# Patient Record
Sex: FEMALE | Race: WHITE | NOT HISPANIC OR LATINO | Employment: UNEMPLOYED | URBAN - METROPOLITAN AREA
[De-identification: names, ages, dates, MRNs, and addresses within clinical notes are randomized per-mention and may not be internally consistent; named-entity substitution may affect disease eponyms.]

---

## 2018-01-12 NOTE — PROGRESS NOTES
Assessment    1  Encounter for preventive health examination (V70 0) (Z00 00)    Plan  Influenza vaccine administered    · Stop: Fluzone Quadrivalent 0 5 ML Intramuscular Suspension  Need for Tdap vaccination    · Stop: Adacel 5-2-15 5 LF-MCG/0 5 Intramuscular Suspension  Screening for cholesterol level, Screening for diabetes mellitus    · (1) COMPREHENSIVE METABOLIC PANEL; Status:Active; Requested for:[id number];    · (1) HEMOGLOBIN A1C; Status:Active; Requested for:[id number];    · (1) LIPID PANEL, FASTING; Status:Active; Requested for:[id number];     Discussion/Summary  health maintenance visit Currently, she eats a healthy diet and has an adequate exercise regimen  Last PAP about 5 years ago  Pt has GYN  Recommend screening PAP and GYN exam/breast exam  Breast cancer screening: the patient declines breast cancer screening and Pt wishes for a thermonuclear scan, stating she will get that, not mammogram  Colorectal cancer screening: colorectal cancer screening is not indicated  Screening lab work includes Script for [employer] give for fasting cmp, lipids and HA1C as needed for insurance  we will call her with results  The pt refuses Tdap and Influenza today  She was advised to be evaluated by an ophthalmologist and a dentist  Patient discussion: discussed with the patient, form pending BW results - will fax for her and call when done  ***Of note, patient follows with [facility] in [facility] for care of her thyroid  Chief Complaint  pt here today for insurance physical and form to be filled      History of Present Illness  HM, Adult Female: The patient is being seen for a health maintenance evaluation  General Health: The patient's health since the last visit is described as good   Denies changes to health or recent hospitalizations  She has regular dental visits  She complains of vision problems (stagmatism)  Vision care includes wearing reading glasses and last eye exam: oct 2015   She denies hearing loss  Immunizations status:  refuses flu vaccine today  refuses Tdap today  Lifestyle:  She consumes a diverse and healthy diet  (pt avoids eggs, gluten and dairy)   She does not have any weight concerns  She does not exercise regularly  She does not use tobacco  She denies alcohol use  She denies drug use  Reproductive health:  she reports normal menses  Menstrual history: LMP: the last menstrual period was 9/6/16  Recent menstrual periods: bleeding has been normal  The cycles have been regular  The duration of her recent periods has been regular  she uses contraception  For contraception, she has a partner with a vasectomy  she is sexually active  She is monogamous with a male partner  pregnancy history: G 3P 2, 1, 2(miscarriages: 1 )  Screening: Cervical cancer screening includes a pap smear performed 5 years ago  Breast cancer screening includes pt refuses mammogram  She hasn't been previously screened for colorectal cancer  ([facility]  Review of Systems    Constitutional: No fever, no chills, feels well, no tiredness, no recent weight gain or weight loss  Eyes: No complaints of eye pain, no red eyes, no eyesight problems, no discharge, no dry eyes, no itching of eyes  ENT: no complaints of earache, no loss of hearing, no nose bleeds, no nasal discharge, no sore throat, no hoarseness  Cardiovascular: No complaints of slow heart rate, no fast heart rate, no chest pain, no palpitations, no leg claudication, no lower extremity edema  Respiratory: No complaints of shortness of breath, no wheezing, no cough, no SOB on exertion, no orthopnea, no PND  Gastrointestinal: No complaints of abdominal pain, no constipation, no nausea or vomiting, no diarrhea, no bloody stools  Genitourinary: No complaints of dysuria, no incontinence, no pelvic pain, no dysmenorrhea, no vaginal discharge or bleeding     Musculoskeletal: No complaints of arthralgias, no myalgias, no joint swelling or stiffness, no limb pain or swelling  Integumentary: No complaints of skin rash or lesions, no itching, no skin wounds, no breast pain or lump  Neurological: No complaints of headache, no confusion, no convulsions, no numbness, no dizziness or fainting, no tingling, no limb weakness, no difficulty walking  Psychiatric: Not suicidal, no sleep disturbance, no anxiety or depression, no change in personality, no emotional problems  Endocrine: No complaints of proptosis, no hot flashes, no muscle weakness, no deepening of the voice, no feelings of weakness  Hematologic/Lymphatic: No complaints of swollen glands, no swollen glands in the neck, does not bleed easily, does not bruise easily  Active Problems    1  Hypothyroidism (244 9) (E03 9)   2  Preventative health care (V70 0) (Z00 00)   3  Screening for cholesterol level (V77 91) (Z13 220)   4  Screening for diabetes mellitus (V77 1) (Z13 1)    Past Medical History    · History of Acute sinusitis (461 9) (J01 90)   · History of Acute upper respiratory infection (465 9) (J06 9)   · History of Encounter for screening for malignant neoplasm of cervix (V76 2) (Z12 4)   · History of abnormal weight loss (V13 89) (E09 877)   · History of acute sinusitis (V12 69) (Z87 09)   · History of Screening for malignant neoplasm of breast (V76 10) (Z12 39)    Surgical History    · History of  Section    Family History  Mother    · Family history of Breast cancer, female (174 9) (C49 36)   · Family history of   Father    · Family history of Heart disease (429 9) (I51 9)    Social History    · Never A Smoker    Current Meds   1  Nature-Throid 48 75 MG Oral Tablet; bid; Therapy: [id number] to (Evaluate:2016) Recorded    Allergies    1   Penicillins    Vitals   Recorded: 58MUQ1827 19:99OH   Systolic 026   Diastolic 58   Heart Rate 73   Respiration 16   Temperature 98 7 F   O2 Saturation 98   Height 5 ft 4 5 in   Weight 108 lb    BMI Calculated 18 25   BSA Calculated 1 51     Physical Exam    Constitutional   General appearance: No acute distress, well appearing and well nourished  appears healthy, within normal limits of ideal weight and appearance reflects stated age  vitals reviewed  Eyes   Conjunctiva and lids: No swelling, erythema or discharge  Pupils and irises: Equal, round, reactive to light  EOMI, PERRLA B/L  Ears, Nose, Mouth, and Throat   External inspection of ears and nose: Normal     Otoscopic examination: Tympanic membranes translucent with normal light reflex  Canals patent without erythema  Hearing: Normal   grossly normal B/L at 10ft  Nasal mucosa, septum, and turbinates: Normal without edema or erythema  Lips, teeth, and gums: Normal, good dentition  Oropharynx: Normal with no erythema, edema, exudate or lesions  Neck   Thyroid: Normal, no thyromegaly  Pulmonary   Respiratory effort: No increased work of breathing or signs of respiratory distress  Auscultation of lungs: Clear to auscultation  Cardiovascular   Auscultation of heart: Normal rate and rhythm, normal S1 and S2, no murmurs  Carotid pulses: 2+ bilaterally  right 2+, no bruit heard over the right carotid, left 2+ and no bruit heard over the left carotid  Pedal pulses: 2+ bilaterally  Examination of extremities for edema and/or varicosities: Normal     Abdomen   Abdomen: Non-tender, no masses  The abdomen was flat  Bowel sounds were normal  The abdomen was soft and nontender  Lymphatic   Palpation of lymph nodes in neck: No lymphadenopathy  Palpation of lymph nodes in groin: No lymphadenopathy  Musculoskeletal   Gait and station: Normal     Digits and nails: Normal without clubbing or cyanosis  Joints, bones, and muscles: Normal     Range of motion: Normal     Stability: Normal     Muscle strength/tone: Normal     Skin   Skin and subcutaneous tissue: Normal without rashes or lesions      Neurologic   Cranial nerves: Cranial nerves II-XII intact  Reflexes: 2+ and symmetric  Deep tendon reflexes: 1+ right brachioradialis, 1+ left brachioradialis, 1+ right patella and 1+ left patella  Coordination: Normal finger to nose and heel to shin      Psychiatric   Judgment and insight: Normal     Orientation to person, place, and time: Normal     Mood and affect: Normal        Signatures   Electronically signed by : Lupe Cox, [facility]; Sep 28 2016  1:55PM EST                       (Author)    Electronically signed by : Birdie Cano DO; Sep 28 2016  3:39PM EST

## 2021-04-01 ENCOUNTER — OFFICE VISIT (OUTPATIENT)
Dept: OBGYN CLINIC | Facility: CLINIC | Age: 46
End: 2021-04-01
Payer: COMMERCIAL

## 2021-04-01 VITALS
HEIGHT: 66 IN | OXYGEN SATURATION: 98 % | BODY MASS INDEX: 17.64 KG/M2 | DIASTOLIC BLOOD PRESSURE: 70 MMHG | HEART RATE: 81 BPM | SYSTOLIC BLOOD PRESSURE: 110 MMHG | WEIGHT: 109.8 LBS

## 2021-04-01 DIAGNOSIS — E58 DIETARY CALCIUM DEFICIENCY: ICD-10-CM

## 2021-04-01 DIAGNOSIS — Z72.3 INADEQUATE EXERCISE: ICD-10-CM

## 2021-04-01 DIAGNOSIS — Z01.419 ENCOUNTER FOR ANNUAL ROUTINE GYNECOLOGICAL EXAMINATION: Primary | ICD-10-CM

## 2021-04-01 DIAGNOSIS — Z12.4 PAP SMEAR FOR CERVICAL CANCER SCREENING: ICD-10-CM

## 2021-04-01 DIAGNOSIS — Z12.31 VISIT FOR SCREENING MAMMOGRAM: ICD-10-CM

## 2021-04-01 DIAGNOSIS — R63.6 UNDERWEIGHT: ICD-10-CM

## 2021-04-01 DIAGNOSIS — Z12.11 COLON CANCER SCREENING: ICD-10-CM

## 2021-04-01 PROBLEM — E06.3 HYPOTHYROIDISM DUE TO HASHIMOTO'S THYROIDITIS: Status: ACTIVE | Noted: 2021-04-01

## 2021-04-01 PROBLEM — E03.8 HYPOTHYROIDISM DUE TO HASHIMOTO'S THYROIDITIS: Status: ACTIVE | Noted: 2021-04-01

## 2021-04-01 PROBLEM — M50.20 HERNIATED CERVICAL DISC: Status: ACTIVE | Noted: 2021-04-01

## 2021-04-01 PROCEDURE — S0610 ANNUAL GYNECOLOGICAL EXAMINA: HCPCS | Performed by: OBSTETRICS & GYNECOLOGY

## 2021-04-01 RX ORDER — ERGOCALCIFEROL 1.25 MG/1
50000 CAPSULE ORAL
COMMUNITY

## 2021-04-01 RX ORDER — THYROID,PORK 90 MG
TABLET ORAL
COMMUNITY
Start: 2021-02-09

## 2021-04-01 NOTE — PROGRESS NOTES
Pt is a 48 y o   with Patient's last menstrual period was 2021 (exact date)  using condoms (male) and P for [facility] presents for preventive care  She notes the same partner since her last STI evaluation  In her lifetime she has been involved with 5   Safe sexual practices (monogomy, condoms) are followed consistently  · She does  feel safe in the relationship  She does feel safe in her home  · Her calcium intake encompasses milk (cow, goat, almond, cashew, soy, etc) and cheese for a total of 1 servings daily on average  She does take additional Vitamin D (MVI or supplement)  · She exercises 0 times per week  · Her menses occur every 28 Days, last 4-7 days and require regular pad every 7-8 hours  Menstrual History:  OB History        3    Para   2    Term   2            AB   1    Living   2       SAB   1    TAB        Ectopic        Multiple        Live Births   2           Obstetric Comments   Menarche: 14    Menses: 28/4-7/regular pad every 7-8 hours            Menarche age: 14  Patient's last menstrual period was 2021 (exact date)  Period Cycle (Days): 26  Period Duration (Days): 4-7  Period Pattern: Regular  Menstrual Flow: Moderate, Light  Menstrual Control: Thin pad  ·      · She has never recieved an HPV vaccine  · tobacco use : does not use tobacco              · Colonoscopy: never had, referral given  · Mammogram:  Never had; pt reports a thermogram in summer 2020--she reports it was wnl  Reviewed benefits of mammogram--pt will consider rx given  · Pap: 2011-wnl, repeat collected today  Advised that it may not be adequate as she is on her menses and if needs to be repeated we will call her       Past Medical History:   Diagnosis Date    Abnormal Pap smear of cervix 2011-wnl, 2021    Chronic fatigue     Fibromyalgia     Hashimoto's thyroiditis        Past Surgical History:   Procedure Laterality Date     SECTION      X's 2; second one associated with uterine rupture    DILATION AND CURETTAGE, DIAGNOSTIC / THERAPEUTIC      missed     WISDOM TOOTH EXTRACTION         OB History    Para Term  AB Living   3 2 2   1 2   SAB TAB Ectopic Multiple Live Births   1       2      # Outcome Date GA Lbr Shahram/2nd Weight Sex Delivery Anes PTL Lv   3 SAB            2 Term     F CS-LTranv   KAREEM   1 Term     M CS-LTranv   KAREEM      Obstetric Comments   Menarche: 15      Menses: 28/4-7/regular pad every 7-8 hours            Current Outpatient Medications:     [place] Thyroid 90 MG tablet, TAKE ONE TABLET BY ORAL ROUTE EVERY MORNING FOR THYROID, Disp: , Rfl:     Cyanocobalamin (VITAMIN B 12 PO), Take by mouth, Disp: , Rfl:     ergocalciferol (VITAMIN D2) 50,000 units, Take 50,000 Units by mouth, Disp: , Rfl:     Allergies   Allergen Reactions    Penicillins Hallucinations and Other (See Comments)     hallucinations, delusions, vomiting         Social History     Socioeconomic History    Marital status:      Spouse name: None    Number of children: 2    Years of education: None    Highest education level: Master's degree (e g , MA, MS, Sydnee, MEd, MSW, JOSIANE)   Occupational History    Occupation: musician   Social Needs    Financial resource strain: None    Food insecurity     Worry: None     Inability: None    Transportation needs     Medical: None     Non-medical: None   Tobacco Use    Smoking status: Never Smoker    Smokeless tobacco: Never Used   Substance and Sexual Activity    Alcohol use: Not Currently    Drug use: Never    Sexual activity: Yes     Partners: Male     Birth control/protection: Rhythm, Condom Male     Comment: lifetime partners: 5; current partner: 2020   Lifestyle    Physical activity     Days per week: None     Minutes per session: None    Stress: None   Relationships    Social connections     Talks on phone: None     Gets together: None     Attends Protestant service: None     Active member of club or organization: None     Attends meetings of clubs or organizations: None     Relationship status: None    Intimate partner violence     Fear of current or ex partner: None     Emotionally abused: None     Physically abused: None     Forced sexual activity: None   Other Topics Concern    None   Social History Narrative    Alevism: Sharona [employer] blood products    Has h o 1 u PRBC at time of uterine rupture                Exercise:  none    Calcium: 1 c coconut milk 2-3x/week; 1 cheese once weekly,        Family History   Problem Relation Age of Onset   [facility] Breast cancer Mother          age 61    Bone cancer Mother         from breast mets    Heart failure Father     No Known Problems Sister     No Known Problems Brother     Uterine cancer Maternal Grandmother     No Known Problems Maternal Grandfather     Heart disease Paternal Grandfather     Ovarian cancer Neg Hx     Colon cancer Neg Hx        Blood pressure 110/70, pulse 81, height 5' 6" (1 676 m), weight 49 8 kg (109 lb 12 8 oz), last menstrual period 2021, SpO2 98 %, not currently breastfeeding  and Body mass index is 17 72 kg/m²  Physical Exam  Constitutional:       General: She is not in acute distress  Appearance: Normal appearance  She is well-developed  She is not ill-appearing  HENT:      Head: Normocephalic and atraumatic  Eyes:      Extraocular Movements: Extraocular movements intact  Conjunctiva/sclera: Conjunctivae normal    Neck:      Musculoskeletal: Normal range of motion and neck supple  Thyroid: No thyromegaly  Trachea: No tracheal deviation  Cardiovascular:      Rate and Rhythm: Normal rate and regular rhythm  Heart sounds: Normal heart sounds  Pulmonary:      Effort: Pulmonary effort is normal  No respiratory distress  Breath sounds: Normal breath sounds  No stridor  No wheezing or rales  Abdominal:      General: Bowel sounds are normal  There is no distension  Palpations: Abdomen is soft  There is no mass  Tenderness: There is no abdominal tenderness  There is no guarding or rebound  Hernia: No hernia is present  Musculoskeletal: Normal range of motion  General: No tenderness  Lymphadenopathy:      Cervical: No cervical adenopathy  Skin:     General: Skin is warm  Findings: No erythema or rash  Neurological:      Mental Status: She is alert and oriented to person, place, and time  Psychiatric:         Mood and Affect: Mood normal          Behavior: Behavior normal          Thought Content: Thought content normal          Judgment: Judgment normal          Breasts: breasts appear normal, no suspicious masses, no skin or nipple changes or axillary nodes, symmetric fibrous changes in both upper outer quadrants  vulva: normal external genitalia for age and no lesions, masses, epithelial changes, or exudate  vagina: color pink, rugae  well formed rugae and bleeding  with a moderate amount of bleeding  cervix: parous, no lesions  and pap obtained  uterus: NSSC, AF, NT, mobile  adnexa: no masses or tenderness      A/P:  Pt is a 48 y o  [place] with      Diagnoses and all orders for this visit:    Encounter for annual routine gynecological examination    Pap smear for cervical cancer screening  -     Liquid-based pap, screening    Visit for screening mammogram  -     Mammo screening bilateral w 3d & cad; Future    Colon cancer screening  -     Ambulatory referral to Gastroenterology; Future    Dietary calcium deficiency  Patient advised recommendation of daily dietary calcium of 1000 mg calcium  Inadequate exercise  Patient advised recommendation of exercise 5 times per week for 30 minutes  Underweight  Patient advised recommendation of BMI to be between 19-25

## 2022-04-06 ENCOUNTER — TELEPHONE (OUTPATIENT)
Dept: FAMILY MEDICINE CLINIC | Facility: CLINIC | Age: 47
End: 2022-04-06

## 2022-06-19 NOTE — TELEPHONE ENCOUNTER
06/18/22 11:07 PM     Thank you for your request  Your request has been received, reviewed, and the patient chart updated  The PCP has successfully been removed with a patient attribution note  This message will now be completed      Thank you  Leslie Cook

## 2022-10-12 PROBLEM — Z12.11 COLON CANCER SCREENING: Status: RESOLVED | Noted: 2021-04-01 | Resolved: 2022-10-12

## 2024-02-21 PROBLEM — Z01.419 ENCOUNTER FOR ANNUAL ROUTINE GYNECOLOGICAL EXAMINATION: Status: RESOLVED | Noted: 2021-04-01 | Resolved: 2024-02-21

## 2024-02-28 ENCOUNTER — OFFICE VISIT (OUTPATIENT)
Dept: URGENT CARE | Facility: CLINIC | Age: 49
End: 2024-02-28
Payer: COMMERCIAL

## 2024-02-28 VITALS
WEIGHT: 115 LBS | HEART RATE: 93 BPM | TEMPERATURE: 97.2 F | RESPIRATION RATE: 20 BRPM | BODY MASS INDEX: 18.56 KG/M2 | SYSTOLIC BLOOD PRESSURE: 104 MMHG | DIASTOLIC BLOOD PRESSURE: 68 MMHG | OXYGEN SATURATION: 100 %

## 2024-02-28 DIAGNOSIS — J02.9 SORE THROAT: ICD-10-CM

## 2024-02-28 DIAGNOSIS — H60.502 ACUTE OTITIS EXTERNA OF LEFT EAR, UNSPECIFIED TYPE: Primary | ICD-10-CM

## 2024-02-28 LAB — S PYO AG THROAT QL: NEGATIVE

## 2024-02-28 PROCEDURE — 99283 EMERGENCY DEPT VISIT LOW MDM: CPT | Performed by: NURSE PRACTITIONER

## 2024-02-28 PROCEDURE — G0382 LEV 3 HOSP TYPE B ED VISIT: HCPCS | Performed by: NURSE PRACTITIONER

## 2024-02-28 PROCEDURE — 87880 STREP A ASSAY W/OPTIC: CPT | Performed by: NURSE PRACTITIONER

## 2024-02-28 RX ORDER — LEVOTHYROXINE, LIOTHYRONINE 19; 4.5 UG/1; UG/1
TABLET ORAL
COMMUNITY
Start: 2024-02-28

## 2024-02-28 RX ORDER — RIZATRIPTAN BENZOATE 10 MG/1
10 TABLET ORAL
COMMUNITY
Start: 2023-12-11

## 2024-02-28 RX ORDER — OFLOXACIN 3 MG/ML
10 SOLUTION AURICULAR (OTIC) 2 TIMES DAILY
Qty: 5 ML | Refills: 0 | Status: SHIPPED | OUTPATIENT
Start: 2024-02-28 | End: 2024-03-06

## 2024-02-28 RX ORDER — AZITHROMYCIN 250 MG/1
TABLET, FILM COATED ORAL
COMMUNITY
Start: 2024-01-12

## 2024-02-28 NOTE — PATIENT INSTRUCTIONS
Swimmer's Ear   AMBULATORY CARE:   Swimmer's ear , also called otitis externa, is an infection in the outer ear canal. This canal goes from the outside of your ear to your eardrum. Swimmer's ear most often occurs when water remains in your ear after you swim. This creates a moist area for bacteria to grow. Scratches or damage from your fingers, cotton swabs, or other objects can also cause swimmer's ear.       Common signs and symptoms:   Ear pain    Red, swollen, itchy ear canal    Fluid or pus draining from your ear    A plugged ear and trouble hearing    Seek care immediately if:   You have severe ear pain.    You are suddenly not able to hear.    You have new swelling in your face, behind your ears, or in your neck.    You suddenly cannot move part of your face, or it feels numb.    Call your doctor if:   You have a fever.    Your symptoms get worse or do not go away, even after treatment.    You have questions or concerns about your condition or care.    Treatment for swimmer's ear  may include cleaning your outer ear canal first. This will help clean any ear wax, flaky skin, or other discharge. You may then need any of the following:  Medicines:      Ear drops  help fight infection and decrease redness and swelling.    Acetaminophen  decreases pain and fever. It is available without a doctor's order. Ask how much to take and how often to take it. Follow directions. Read the labels of all other medicines you are using to see if they also contain acetaminophen, or ask your doctor or pharmacist. Acetaminophen can cause liver damage if not taken correctly.    NSAIDs , such as ibuprofen, help decrease swelling, pain, and fever. This medicine is available with or without a doctor's order. NSAIDs can cause stomach bleeding or kidney problems in certain people. If you take blood thinner medicine, always ask your healthcare provider if NSAIDs are safe for you. Always read the medicine label and follow directions.    An  ear wick  may be used if your ear canal is blocked. A wick (small tube) made of cotton or gauze is placed in your ear. The wick helps pull extra fluid out of your ear canal. Kristen also help draw medicine into your ear canal.    How to use ear drops:   Warm the bottle of ear drops in your hands  for a few minutes.    Lie down on your side with your infected ear facing up.  This will help the medicine travel completely through your ear canal.    Gently pull the ear up and back.  Carefully drip the correct number of ear drops into your ear. Have another person help you if possible.         For children younger than 3 years,  gently pull and hold the ear down and back.         For children older than 3 years,  gently pull and hold the ear up and back.         Stay in the same position  for 3 to 5 minutes to let the medicine soak in.    Prevent swimmer's ear:   Dry your ears completely after you swim or bathe.  Gently wipe your outer ear with a soft towel or cloth. Use ear plugs when you swim.    Do not put cotton swabs or other objects in your ears.  These can scratch or damage your ear. They can also push ear wax deeper in and irritate your ear.    Put cotton balls gently in your outer ear  when you apply hair spray, hair dye, or perfume.    Follow up with your doctor as directed:  Write down your questions so you remember to ask them during your visits.  © Copyright [employer] 2023 Information is for End User's use only and may not be sold, redistributed or otherwise used for commercial purposes.  The above information is an  only. It is not intended as medical advice for individual conditions or treatments. Talk to your doctor, nurse or pharmacist before following any medical regimen to see if it is safe and effective for you.

## 2024-02-28 NOTE — PROGRESS NOTES
[facility]        NAME: Beth Dorado is a 48 y.o. female  : 1975    MRN: [id number]  DATE: 2024  TIME: 7:49 PM    Assessment and Plan   Acute otitis externa of left ear, unspecified type [H60.502]  1. Acute otitis externa of left ear, unspecified type  ofloxacin (FLOXIN) 0.3 % otic solution      2. Sore throat  POCT rapid strepA            Patient Instructions   Strep test negative today. Pt declines COVID testing. Floxin sent to the pharmacy for otitis externa of the left ear. Discussed SE and use. Advised May take tylenol/ibuprofen as needed for pain. Heat as needed for pain. Salt water gargles as needed for sore throat. May use Mucinex and flonase for PND and congestion. Discussed use of medications.     Follow up with PCP in 3-5 days.  Proceed to  ER if symptoms worsen.    Chief Complaint     Chief Complaint   Patient presents with   • Sore Throat   • Earache     Pt C/O left ear pain with pain radiating down towards the jaw, sinus pressure, and a sore throat that started on Monday.          History of Present Illness       Pt here today with concerns of sore throat, congestion, PND, and left ear pain since Monday. Patient reports her daughter has recently been sick with fever. Patient declines COVID testing. Patient denies fevers, body aches, difficulty swallowing, SOB, N/V/D. Patient reports she has been taking aleve and using a warm compress with some relief. Pt denies hx of recurrent ear infections.     Sore Throat   The current episode started in the past 7 days. The problem has been gradually worsening. The pain is worse on the left side. There has been no fever. The pain is moderate. Associated symptoms include congestion, coughing, ear pain, a plugged ear sensation and trouble swallowing (painful swallowing). Pertinent negatives include no abdominal pain, diarrhea, drooling, ear discharge, headaches, hoarse voice, neck pain, shortness of breath, stridor, swollen glands or  vomiting. She has had no exposure to strep or mono. Treatments tried: ibupropfen. The treatment provided moderate relief.   Earache   There is pain in the left ear. This is a new problem. The current episode started in the past 7 days. The problem occurs constantly. The problem has been gradually worsening. There has been no fever. Associated symptoms include coughing, rhinorrhea and a sore throat. Pertinent negatives include no abdominal pain, diarrhea, ear discharge, headaches, neck pain or vomiting. Treatments tried: Aleve and heating pad. The treatment provided mild relief. There is no history of a chronic ear infection, hearing loss or a tympanostomy tube.       Review of Systems   Review of Systems   Constitutional:  Negative for activity change, chills and fatigue.   HENT:  Positive for congestion, ear pain, postnasal drip, rhinorrhea, sore throat and trouble swallowing (painful swallowing). Negative for drooling, ear discharge, hoarse voice, sinus pressure and sinus pain.    Respiratory:  Positive for cough. Negative for apnea, choking, chest tightness, shortness of breath, wheezing and stridor.    Cardiovascular: Negative.    Gastrointestinal:  Negative for abdominal pain, diarrhea and vomiting.   Musculoskeletal:  Negative for neck pain.   Neurological:  Negative for headaches.         Current Medications       Current Outpatient Medications:   •  [place] Thyroid 90 MG tablet, TAKE ONE TABLET BY ORAL ROUTE EVERY MORNING FOR THYROID, Disp: , Rfl:   •  Cyanocobalamin (VITAMIN B 12 PO), Take by mouth, Disp: , Rfl:   •  ergocalciferol (VITAMIN D2) 50,000 units, Take 50,000 Units by mouth, Disp: , Rfl:   •  NP Thyroid 30 MG tablet, , Disp: , Rfl:   •  ofloxacin (FLOXIN) 0.3 % otic solution, Administer 10 drops into the left ear 2 (two) times a day for 7 days, Disp: 5 mL, Rfl: 0  •  Ubrogepant (UBRELVY) 50 MG tablet, Take 50 mg by mouth, Disp: , Rfl:   •  azithromycin (ZITHROMAX) 250 mg tablet, TAKE 2 TABLETS ON  FIRST DAY , THEN 1 TABLET DAILY FOR 4 DAYS (Patient not taking: Reported on 2024), Disp: , Rfl:   •  rimegepant sulfate (NURTEC) 75 mg TBDP, Take 75 mg by mouth (Patient not taking: Reported on 2024), Disp: , Rfl:   •  rizatriptan (MAXALT) 10 mg tablet, Take 10 mg by mouth (Patient not taking: Reported on 2024), Disp: , Rfl:     Current Allergies     Allergies as of 2024 - Reviewed 2024   Allergen Reaction Noted   • Penicillins Hallucinations and Other (See Comments) 2007            The following portions of the patient's history were reviewed and updated as appropriate: allergies, current medications, past family history, past medical history, past social history, past surgical history and problem list.     Past Medical History:   Diagnosis Date   • Abnormal Pap smear of cervix 2011-wnl, 2021-wnl, HRHPV neg   • Chronic fatigue    • Fibromyalgia    • Hashimoto's thyroiditis        Past Surgical History:   Procedure Laterality Date   •  SECTION      X's 2; second one associated with uterine rupture   • DILATION AND CURETTAGE, DIAGNOSTIC / THERAPEUTIC      missed    • WISDOM TOOTH EXTRACTION         Family History   Problem Relation Age of Onset   • Breast cancer Mother          age 63   • Bone cancer Mother         from breast mets   • Heart failure Father    • No Known Problems Sister    • No Known Problems Brother    • Uterine cancer Maternal Grandmother    • No Known Problems Maternal Grandfather    • Heart disease Paternal Grandfather    • Ovarian cancer Neg Hx    • Colon cancer Neg Hx          Medications have been verified.        Objective   /68 (BP Location: Left arm, Patient Position: Sitting, Cuff Size: Standard)   Pulse 93   Temp (!) 97.2 °F (36.2 °C) (Tympanic)   Resp 20   Wt 52.2 kg (115 lb)   SpO2 100%   BMI 18.56 kg/m²   No LMP recorded.       Physical Exam     Physical Exam  Vitals and nursing note reviewed.    Constitutional:       General: She is not in acute distress.     Appearance: She is well-developed. She is ill-appearing. She is not toxic-appearing or diaphoretic.   HENT:      Head: Normocephalic and atraumatic.      Right Ear: Hearing, tympanic membrane, ear canal and external ear normal.      Left Ear: Hearing and external ear normal. Tenderness present. Tympanic membrane is erythematous and bulging.   Neurological:      Mental Status: She is alert.

## 2024-02-28 NOTE — PROGRESS NOTES
[facility]    NAME: Beth Dorado is a 48 y.o. female  : 1975    MRN: [id number]  DATE: 2024  TIME: 6:36 PM    Assessment and Plan   No primary diagnosis found.  No diagnosis found.    Patient Instructions   There are no Patient Instructions on file for this visit.    Chief Complaint   No chief complaint on file.      History of Present Illness   Beth Dorado presents to the clinic c/o  HPI    Review of Systems   Review of Systems    Current Medications     Long-Term Medications   Medication Sig Dispense Refill    [place] Thyroid 90 MG tablet TAKE ONE TABLET BY ORAL ROUTE EVERY MORNING FOR THYROID      ergocalciferol (VITAMIN D2) 50,000 units Take 50,000 Units by mouth         Current Allergies     Allergies as of 2024 - Reviewed 2021   Allergen Reaction Noted    Penicillins Hallucinations and Other (See Comments) 2007          The following portions of the patient's history were reviewed and updated as appropriate: allergies, current medications, past family history, past medical history, past social history, past surgical history and problem list.  Past Medical History:   Diagnosis Date    Abnormal Pap smear of cervix 2011-wnl, 2021-wnl, HRHPV neg    Chronic fatigue     Fibromyalgia     Hashimoto's thyroiditis      Past Surgical History:   Procedure Laterality Date     SECTION      X's 2; second one associated with uterine rupture    DILATION AND CURETTAGE, DIAGNOSTIC / THERAPEUTIC      missed     WISDOM TOOTH EXTRACTION       Family History   Problem Relation Age of Onset    Breast cancer Mother          age 63    Bone cancer Mother         from breast mets    Heart failure Father     No Known Problems Sister     No Known Problems Brother     Uterine cancer Maternal Grandmother     No Known Problems Maternal Grandfather     Heart disease Paternal Grandfather     Ovarian cancer Neg Hx     Colon cancer Neg Hx        Objective   There  were no vitals taken for this visit.  No LMP recorded.       Physical Exam     Physical Exam

## 2024-02-29 ENCOUNTER — TELEPHONE (OUTPATIENT)
Dept: URGENT CARE | Facility: CLINIC | Age: 49
End: 2024-02-29

## 2024-02-29 NOTE — TELEPHONE ENCOUNTER
[facility] called stating that they did not receive the prescription last night.  Verbal rx given over the phone to pharmacist.

## 2024-02-29 NOTE — TELEPHONE ENCOUNTER
Patient called to office stating that the prescription called in last night was not at the pharmacy.  Instructed patient that it was called into [facility] in [place].  Instructed her that if it was not there to have them call us and we can call it back in.

## 2024-03-28 ENCOUNTER — OFFICE VISIT (OUTPATIENT)
Dept: GYNECOLOGY | Facility: CLINIC | Age: 49
End: 2024-03-28
Payer: COMMERCIAL

## 2024-03-28 VITALS
BODY MASS INDEX: 18.93 KG/M2 | SYSTOLIC BLOOD PRESSURE: 118 MMHG | HEIGHT: 65 IN | WEIGHT: 113.6 LBS | DIASTOLIC BLOOD PRESSURE: 66 MMHG

## 2024-03-28 DIAGNOSIS — Z12.31 ENCOUNTER FOR SCREENING MAMMOGRAM FOR BREAST CANCER: ICD-10-CM

## 2024-03-28 DIAGNOSIS — Z01.419 ENCOUNTER FOR ANNUAL ROUTINE GYNECOLOGICAL EXAMINATION: Primary | ICD-10-CM

## 2024-03-28 DIAGNOSIS — N92.0 MENORRHAGIA WITH REGULAR CYCLE: ICD-10-CM

## 2024-03-28 PROCEDURE — 99386 PREV VISIT NEW AGE 40-64: CPT | Performed by: OBSTETRICS & GYNECOLOGY

## 2024-03-28 NOTE — PROGRESS NOTES
Assessment/Plan:    Anemia-I recommended that she start over-the-counter iron.  She has some at home and will start taking this.    Heavy menses-this is new and given her anemia, should be evaluated.  She also had an ultrasound last year that showed some cystic changes in her endometrium and the radiologist suggested ruling out hyperplasia.  She has not had any irregular cycles and no bleeding in between.  It is.  This ultrasound was done 1 year ago and should be repeated.  I recommended a sonohysterogram and endometrial biopsy can be done at the same time.  She is agreeable and will schedule this.      Pap is up-to-date, reviewed guidelines    Breast cancer screening-she has never had a mammogram.  She has concerns about radiation.  She gets thermograms.  She has had them and they have been normal.  I explained that there is not good evidence to support the thermogram as a replacement for the mammogram.  Given her family history, I feel that it is important for her to have a mammogram.  She will consider this but does not think she would want to have it in the near future.      colon cancer screening - saw [facility] did not they urged her to schedule colonoscopy.  She is considering this and will give them a call.    I did explain that she does not need to have estradiol levels checked regularly as she is premenopausal and does not need a supplement for this.    discussed preventive care, regular exercise and a healthy diet      No problem-specific Assessment & Plan notes found for this encounter.       Diagnoses and all orders for this visit:    Encounter for annual routine gynecological examination    Encounter for screening mammogram for breast cancer          Subjective:      Patient ID: Beth Dorado is a 49 y.o. female.    New pt - 49 year old female presents for yearly.  She has been getting regular GYN care.  Menstrual cycles are regular approximately every 24 to 35 days.  She does carefully track her cycle.   "Typically she has light flow but the last few months, it has been very heavy for 2 to 3 days and she must change her pads frequently.  She sometimes has clots.  Generally, she bleeds for 6 days.  She does not complain of cramps.  She does not skip cycles.  Occasional night sweats, no hot flashes during the day.    She had an ultrasound last year and she had a cyst on each ovary.  Her endometrium was 17 mm and had a heterogeneous appearance. There were cysts on each ovary.  There were possible fibroids.  She was going to have follow up but did not due to insurance changes.  US was done due to findings noted on CT.    Recent H/H were 10.5/32.6.  her family dr has been check estradiol levels and she has been taking a supplement to lower them.    Past OB/GYN history-2 C-sections, the second 1 revealed a uterine rupture and bladder rupture or laceration.  Both were repaired without difficulty.  1 missed AB with a D&E.  Menarche at age 15  Contraception - she carefully watches her cycles and has used natural family planning in the past.  She sometimes uses condoms.      Migraine with aura  Abnormal pap in , colposcopy, normal since then    Getting thermograms.  She has not had a mammogram, concerned about radiation.  Mother had breast cancer,  at ageg 63 from metastatic disease, probably dx in her 50s      Normal Pap, negative HPV in 2021        The following portions of the patient's history were reviewed and updated as appropriate: allergies, current medications, past family history, past medical history, past social history, past surgical history, and problem list.    Review of Systems   Constitutional: Negative.    Gastrointestinal: Negative.    Genitourinary:  Positive for menstrual problem.         Objective:      /66 (BP Location: Left arm, Patient Position: Sitting)   Ht 5' 5\" (1.651 m)   Wt 51.5 kg (113 lb 9.6 oz)   BMI 18.90 kg/m²          Physical Exam  Vitals reviewed.   Constitutional:  "      Appearance: Normal appearance. She is well-developed.   Neck:      Thyroid: No thyromegaly.      Trachea: No tracheal deviation.   Cardiovascular:      Rate and Rhythm: Normal rate and regular rhythm.   Pulmonary:      Effort: Pulmonary effort is normal.      Breath sounds: Normal breath sounds.   Chest:   Breasts:     Breasts are symmetrical.      Right: Normal. No inverted nipple, mass, nipple discharge, skin change or tenderness.      Left: Normal. No inverted nipple, mass, nipple discharge, skin change or tenderness.   Abdominal:      General: There is no distension.      Palpations: Abdomen is soft. There is no mass.      Tenderness: There is no abdominal tenderness.   Genitourinary:     Labia:         Right: No rash, tenderness, lesion or injury.         Left: No rash, tenderness, lesion or injury.       Vagina: Normal.      Cervix: Normal. No cervical motion tenderness, discharge or friability.      Uterus: Normal.       Adnexa: Right adnexa normal and left adnexa normal.        Right: No mass, tenderness or fullness.          Left: No mass, tenderness or fullness.        Rectum: Normal.   Neurological:      Mental Status: She is alert.

## 2024-04-10 NOTE — PROGRESS NOTES
AMB US Pelvic Non OB    Date/Time: 4/12/2024 9:45 AM    Performed by: Waleska Wheeler  Authorized by: Inge Espinal DO  Universal Protocol:  Consent: Verbal consent obtained.  Consent given by: patient  Timeout called at: 4/12/2024 10:03 AM.  Patient understanding: patient states understanding of the procedure being performed  Patient identity confirmed: verbally with patient    Procedure details:     SIS Procedure: Yes    Indications: non-obstetric vaginal bleeding      Technique:  Transvaginal US, Non-OB    Position: lithotomy exam    Uterine findings:     Length (cm): 10.57    Height (cm):  7.41    Width (cm):  7.91    Endometrial stripe: identified      Endometrium thickness (mm):  16.6  Left ovary findings:     Left ovary:  Visualized    Length (cm): 3.17    Height (cm): 2.25    Width (cm): 1.56  Right ovary findings:     Right ovary:  Visualized    Length (cm): 8.83    Height (cm): 6.1    Width (cm): 6.87  Other findings:     Free pelvic fluid: not identified      Free peritoneal fluid: not identified    Post-Procedure Details:     Impression:  Anteflexed uterus is inhomogeneous throughout and demonstrates a posterior fundal fibroid 3.5cm (previously 2.2cm). There are areas of low level echogenicities throughout suggestive of adenomyosis. The left ovary appears within normal limits.  The right ovary demonstrates a 6.0cm complex cystic mass. No free fluid.     Tolerance:  Tolerated well, no immediate complications    Complications: no complications    Additional Procedure Comments:      Genesco F8 E8C-RS transvaginal transducer Serial # [id number] was used to perform the examination today and subsequently followed with high level disinfection utilizing Trophon EPR procedure.     Ultrasound performed at:     [facility]  [facility] [address] [address]  Phone:  [phone]  Fax:  [phone]    Sonohysterogram    Date/Time: 4/12/2024 9:45 AM    Performed by: Inge Espinal DO  Authorized by: Inge  DO Teddy  Universal Protocol:  Consent: Verbal consent obtained.  Consent given by: patient  Timeout called at: 4/12/2024 10:03 AM.  Patient understanding: patient states understanding of the procedure being performed  Patient identity confirmed: verbally with patient    Pre-procedure:     Prepped with: Hibiclens    Procedure:     Cervix cleaned and prepped: yes      Tenaculum applied to cervix: yes      Uterus sounded: yes      Catheter inserted: yes      Uterine cavity distended with saline: no    Post-procedure:     Patient observed: yes      Post procedure instructions given to patient: yes      Patient tolerated procedure well with no complications: yes    Comments:      Sonohysterogram was attempted. Three different sonohysterogram catheter types were attempted, however the catheter would not pass through the cervix.   Endometrial biopsy    Date/Time: 4/12/2024 9:45 AM    Performed by: Inge Espinal DO  Authorized by: Inge Espinal DO  Universal Protocol:  Consent: Verbal consent obtained.  Consent given by: patient  Timeout called at: 4/12/2024 10:03 AM.  Patient understanding: patient states understanding of the procedure being performed  Patient identity confirmed: verbally with patient    Procedure:     Procedure: endometrial biopsy with Pipelle      A bivalve speculum was placed in the vagina: yes      Specimen collected: insufficient sample size      Patient tolerated procedure well with no complications: yes    Comments:     Procedure comments:  Endometrial biopsy was attempted and subsequently discontinued due to inability to pass catheter into the uterine lining.

## 2024-04-12 ENCOUNTER — PROCEDURE VISIT (OUTPATIENT)
Dept: GYNECOLOGY | Facility: CLINIC | Age: 49
End: 2024-04-12
Payer: COMMERCIAL

## 2024-04-12 ENCOUNTER — ULTRASOUND (OUTPATIENT)
Dept: GYNECOLOGY | Facility: CLINIC | Age: 49
End: 2024-04-12

## 2024-04-12 DIAGNOSIS — N94.89 ADNEXAL MASS: Primary | ICD-10-CM

## 2024-04-12 DIAGNOSIS — N92.0 MENORRHAGIA WITH REGULAR CYCLE: Primary | ICD-10-CM

## 2024-04-12 LAB — SL AMB POCT URINE HCG: NORMAL

## 2024-04-12 PROCEDURE — 99213 OFFICE O/P EST LOW 20 MIN: CPT | Performed by: OBSTETRICS & GYNECOLOGY

## 2024-04-12 NOTE — LETTER
April 15, 2024         Patient: Beth Dorado   YOB: 1975   Date of Visit: 4/12/2024       Dear Dr. Cruz:    I saw Beth Dorado today for evaluation of heavy menstrual cycles and an ovarian cyst that was noted last year on ultrasound.  She was supposed to have an MRI however due to difficulties with insurance, this was not done.  Her menstrual cycles are very heavy.  Endometrial biopsy attempted but was unsuccessful.  I ordered an MRI and a Ca1 25 due to the appearance of her adnexal mass.    Below are my notes for this consultation.    If you have questions, please do not hesitate to call me. I look forward to following your patient along with you.         Sincerely,        Inge Espinal, DO Inge Espinal DO  4/15/2024 10:51 AM  Incomplete  Assessment/Plan:     Heavy menses-sonohysterogram shows a thickened endometrium.  I was unable to do an endometrial biopsy.  On ultrasound, her endocervical canal appeared tortuous.  I tried 3 different sonohysterogram catheters.  I explained that she may need a D&C.    Complex right adnexal mass.  This has doubled in size from her ultrasound last year.  This will need to be removed.  I did recommend an MRI for more information.  I explained that depending on the MRI results and the , I may have her see GYN oncology.  She is also had several pelvic surgeries and may have adhesions.  I explained that  can have a false positive and her age.  It was normal last year.  I recommended repeating it.  These were ordered and she will have this done.  MRI is scheduled for the end of the month.    Fibroid-this has enlarged since last year and could be contributing to her heavy menstrual cycles.    Suggestion of adenomyosis on ultrasound.           Diagnoses and all orders for this visit:    Adnexal mass  -     Cancel: MRI pelvis female wo and w contrast; Future  -     Cancel: ; Future  -     Cancel:   -     MRI pelvis female wo and w contrast;  Future  -     ; Future  -               Subjective:     Patient ID: Beth Dorado is a 49 y.o. female.    HPI    Review of Systems     Objective:     Physical Exam       Inge Espinal DO  4/12/2024  4:38 PM  Sign when Signing Visit  Assessment/Plan:     Unable to do emb, modifcyndy, tried three different catheters    Note to mercedez Avelar and all orders for this visit:    Adnexal mass  -     Cancel: MRI pelvis female wo and w contrast; Future  -     Cancel: ; Future  -     Cancel:   -     MRI pelvis female wo and w contrast; Future  -     ; Future  -               Subjective:     Patient ID: Beth Dorado is a 49 y.o. female.    HPI    Review of Systems      Objective:     Physical Exam

## 2024-04-12 NOTE — PROGRESS NOTES
Assessment/Plan:     Heavy menses-sonohysterogram shows a thickened endometrium.  I was unable to do an endometrial biopsy.  On ultrasound, her endocervical canal appeared tortuous.  I tried 3 different sonohysterogram catheters.  I explained that she may need a D&C.    Complex right adnexal mass.  This has doubled in size from her ultrasound last year.  This will need to be removed.  I did recommend an MRI for more information.  I explained that depending on the MRI results and the , I may have her see GYN oncology.  She is also had several pelvic surgeries and may have adhesions.  I explained that  can have a false positive and her age.  It was normal last year.  I recommended repeating it.  These were ordered and she will have this done.  MRI is scheduled for the end of the month.    Fibroid-this has enlarged since last year and could be contributing to her heavy menstrual cycles.    Suggestion of adenomyosis on ultrasound.           Diagnoses and all orders for this visit:    Adnexal mass  -     Cancel: MRI pelvis female wo and w contrast; Future  -     Cancel: ; Future  -     Cancel:   -     MRI pelvis female wo and w contrast; Future  -     ; Future  -               Subjective:     Patient ID: Beth Dorado is a 49 y.o. female.    Patient here for sonohysterogram.  She was recently seen and having very heavy menstrual cycles.  This was a new finding.  She also had a history of an ultrasound last year that showed a thickened endometrium with a question of hyperplasia and a small complex adnexal mass on the right side.  I recommended a sonohysterogram to further evaluate her endometrium, do an endometrial biopsy and also to evaluate her fibroid and her ovaries.      Ultrasound today shows a fibroid that has slightly enlarged from last year.  She has a thickened endometrium and the right adnexal mass is now measuring 6 cm.  Her left ovary appears normal.  Sonohysterogram attempted  however the catheter could not be placed through her cervix.  Multiple catheters were utilized to attempt to evaluate her endometrium.  She had 2 C-sections and also had a uterine rupture.        Review of Systems   Constitutional: Negative.    Gastrointestinal: Negative.    Genitourinary:  Positive for menstrual problem.         Objective:     Physical Exam  Genitourinary:     General: Normal vulva.      Cervix: Normal.      Uterus: Normal.       Adnexa: Left adnexa normal.        Right: Fullness present.

## 2024-04-17 LAB — CANCER AG125 SERPL-ACNC: 42 U/ML

## 2024-04-26 DIAGNOSIS — N94.89 ADNEXAL MASS: Primary | ICD-10-CM

## 2024-04-29 ENCOUNTER — TELEPHONE (OUTPATIENT)
Age: 49
End: 2024-04-29

## 2024-04-29 ENCOUNTER — TELEPHONE (OUTPATIENT)
Dept: HEMATOLOGY ONCOLOGY | Facility: CLINIC | Age: 49
End: 2024-04-29

## 2024-04-29 NOTE — TELEPHONE ENCOUNTER
I called Beth in response to a referral that was received for patient to establish care with Gynecologic Oncology.      Outreach was made to complete patient's intake questionnaire .     Patient's intake questionnaire was reviewed and complete. Patient's intake has been sent to the team for clinical review.

## 2024-04-29 NOTE — TELEPHONE ENCOUNTER
Patient called stating a referral for Gyn Onc was placed and they called her to set up an appointment. Upon review of chart, I reviewed the following with the patient.  Please call patient and let her know that the MRI shows that her large ovarian cyst is most likely an endometrioma or a hemorrhagic cyst which is benign.  She does have a mildly elevated  however this can be seen with these benign conditions.  I would like her to get the opinion of GYN oncology and if she wants to have this removed, I feel that they should do it as she has had pelvic surgery and most likely has adhesions.  If she wants to be conservative, they can discuss this with her as well.  I will place the referral, please let me know if she has any questions.  Thank you  Patient declined further questions.

## 2024-04-30 ENCOUNTER — TELEPHONE (OUTPATIENT)
Dept: HEMATOLOGY ONCOLOGY | Facility: CLINIC | Age: 49
End: 2024-04-30

## 2024-04-30 NOTE — TELEPHONE ENCOUNTER
I called Beth in response to a referral that was received for patient to establish care with Gynecologic Oncology.     Outreach was made to schedule a consultation.    A consultation was scheduled for patient during this call. Patient is scheduled on 5/6/24 at 1130 with Ayah at the [facility]

## 2024-05-16 ENCOUNTER — OFFICE VISIT (OUTPATIENT)
Dept: GYNECOLOGIC ONCOLOGY | Facility: CLINIC | Age: 49
End: 2024-05-16
Payer: COMMERCIAL

## 2024-05-16 VITALS
TEMPERATURE: 97.7 F | SYSTOLIC BLOOD PRESSURE: 120 MMHG | WEIGHT: 117 LBS | HEIGHT: 65 IN | RESPIRATION RATE: 18 BRPM | HEART RATE: 89 BPM | DIASTOLIC BLOOD PRESSURE: 62 MMHG | BODY MASS INDEX: 19.49 KG/M2 | OXYGEN SATURATION: 97 %

## 2024-05-16 DIAGNOSIS — Z12.31 VISIT FOR SCREENING MAMMOGRAM: ICD-10-CM

## 2024-05-16 DIAGNOSIS — N94.89 ADNEXAL MASS: ICD-10-CM

## 2024-05-16 DIAGNOSIS — Z12.11 COLON CANCER SCREENING: Primary | ICD-10-CM

## 2024-05-16 DIAGNOSIS — N83.201 CYST OF RIGHT OVARY: ICD-10-CM

## 2024-05-16 DIAGNOSIS — N92.0 MENORRHAGIA WITH REGULAR CYCLE: ICD-10-CM

## 2024-05-16 PROBLEM — N83.209 OVARIAN CYST: Status: ACTIVE | Noted: 2024-05-16

## 2024-05-16 PROCEDURE — 99243 OFF/OP CNSLTJ NEW/EST LOW 30: CPT | Performed by: OBSTETRICS & GYNECOLOGY

## 2024-05-16 NOTE — ASSESSMENT & PLAN NOTE
Attempt at office sounding for sonohysterogram was unsuccessful.  Patient has an endometrial stripe measuring 9 mm.  However she is premenopausal.  There is no report of endometrial masses on MRI.  In addition, there are findings consistent/suggestive of adenomyosis (thickened and heterogeneous junctional zone measuring 1.6 cm).  Overall concern for malignancy or premalignant conditions is low.  I discussed with patient options including further exploration or even definitive treatment by means of hysterectomy with uni or bilateral salpingo-oophorectomy versus ongoing observation.  She states bleeding is bothersome but not affecting quality of life overall.    Discussed potential benefit of nonsteroidal anti-inflammatory drugs in terms of decreasing menstrual blood flow.  Patient is not interested in systemic hormonal therapy.  We discussed potential benefit of progestin IUD, however given difficulty in sounding the uterus at the time of hysterosonogram attempt, this option becomes less attractive.    Patient prefers observation and NSAIDs.  I plan to see her back in 3 months.  She will continue to consider the option for definitive surgery.  If irregular bleeding or worsening symptoms arise, she might be considered for hysteroscopy/D&C.

## 2024-05-16 NOTE — ASSESSMENT & PLAN NOTE
Patient continues to decline mammogram.  She has a significant family history.  Today I was emphatic and illustrating the limited efficacy of thermal methods for the detection of early breast cancer.  Patient verbalized understanding.

## 2024-05-16 NOTE — ASSESSMENT & PLAN NOTE
We discussed potential approaches to colon cancer screening.  Patient prefers least invasive approach by Cologuard.  She understands potential need to indicate colonoscopy if abnormal results are noted.  Cologuard ordered.

## 2024-05-16 NOTE — LETTER
May 16, 2024     Inge Espinal DO  [address] [address]    Patient: Beth Dorado   YOB: 1975   Date of Visit: 5/16/2024       Dear Dr. Espinal:    Thank you for referring Beth Dorado to me for evaluation. Below are my notes for this consultation.    If you have questions, please do not hesitate to call me. I look forward to following your patient along with you.         Sincerely,        Edin Poon MD        CC: Osiel Cruz DO

## 2024-05-16 NOTE — ASSESSMENT & PLAN NOTE
I have personally reviewed report from office ultrasound and MRI of the pelvis obtained in April 2024.  I have independently obtained history from patient.    Ovarian cyst is mostly asymptomatic and was found incidentally during imaging for renal colic/emergency room visit.  This lesion measures 3.7 cm and is reported as hemorrhagic and consistent with possible endometrioma on MRI.  I discussed with patient differential diagnosis including benign, borderline and malignant pathology.  I explained the only way to obtain definitive diagnosis by means of surgical resection.  However, given small and homogeneous nature as well as asymptomatic character of this finding I have proposed and we have agreed with close observation.  Slight elevation in  in the 40s noted.  This is of uncertain significance in this premenopausal woman with possible endometriosis/adenomyosis.    Patient will return to the office in 3 months with repeat pelvic ultrasound.

## 2024-05-16 NOTE — PROGRESS NOTES
Assessment/Plan:    Problem List Items Addressed This Visit          Endocrine    Ovarian cyst     I have personally reviewed report from office ultrasound and MRI of the pelvis obtained in April 2024.  I have independently obtained history from patient.    Ovarian cyst is mostly asymptomatic and was found incidentally during imaging for renal colic/emergency room visit.  This lesion measures 3.7 cm and is reported as hemorrhagic and consistent with possible endometrioma on MRI.  I discussed with patient differential diagnosis including benign, borderline and malignant pathology.  I explained the only way to obtain definitive diagnosis by means of surgical resection.  However, given small and homogeneous nature as well as asymptomatic character of this finding I have proposed and we have agreed with close observation.  Slight elevation in  in the 40s noted.  This is of uncertain significance in this premenopausal woman with possible endometriosis/adenomyosis.    Patient will return to the office in 3 months with repeat pelvic ultrasound.           Relevant Orders    US pelvis complete non OB       Obstetrics/Gynecology    Visit for screening mammogram     Patient continues to decline mammogram.  She has a significant family history.  Today I was emphatic and illustrating the limited efficacy of thermal methods for the detection of early breast cancer.  Patient verbalized understanding.         Menorrhagia with regular cycle     Attempt at office sounding for sonohysterogram was unsuccessful.  Patient has an endometrial stripe measuring 9 mm.  However she is premenopausal.  There is no report of endometrial masses on MRI.  In addition, there are findings consistent/suggestive of adenomyosis (thickened and heterogeneous junctional zone measuring 1.6 cm).  Overall concern for malignancy or premalignant conditions is low.  I discussed with patient options including further exploration or even definitive treatment  "by means of hysterectomy with uni or bilateral salpingo-oophorectomy versus ongoing observation.  She states bleeding is bothersome but not affecting quality of life overall.    Discussed potential benefit of nonsteroidal anti-inflammatory drugs in terms of decreasing menstrual blood flow.  Patient is not interested in systemic hormonal therapy.  We discussed potential benefit of progestin IUD, however given difficulty in sounding the uterus at the time of hysterosonogram attempt, this option becomes less attractive.    Patient prefers observation and NSAIDs.  I plan to see her back in 3 months.  She will continue to consider the option for definitive surgery.  If irregular bleeding or worsening symptoms arise, she might be considered for hysteroscopy/D&C.         Relevant Orders    US pelvis complete non OB       Other    Colon cancer screening - Primary     We discussed potential approaches to colon cancer screening.  Patient prefers least invasive approach by Cologuard.  She understands potential need to indicate colonoscopy if abnormal results are noted.  Cologuard ordered.         Relevant Orders    Cologuard     Other Visit Diagnoses       Adnexal mass        Relevant Orders    US pelvis complete non OB          I spent approximately 30 minutes in the care of this patient.  Time was devoted to independent review and interpretation of prior records and outside imaging reports as well as face-to-face counseling.  All questions answered to patient's satisfaction.    CHIEF COMPLAINT:   Consultation for abnormal uterine bleeding and cystic ovarian mass    Patient ID: Beth Dorado is a 49 y.o. female  HPI  49-year-old  with 2 prior  sections.  Her last  was complicated by uterine rupture and bladder injury.  She reports menstrual cycles which are regular and occasionally heavy.  Rarely with clots/soaking through clothing.  She refers to those as mostly \"annoying\".  No intermenstrual bleeding.  " CT scan performed to evaluate kidney stone demonstrated incidental cystic ovarian mass.  She has had other ultrasounds in the past.  Most recently and given history an attempt was made at office sonohysterogram which was unsuccessful.  The patient was sent for MRI which demonstrated at 3.7 cm right ovarian hemorrhagic cystic lesion and findings suggestive of adenomyosis.  The patient was referred for evaluation and treatment.  Her  is noted to be slightly elevated in the 40s.    Breast cancer screening: Has declined mammograms, thermal screening performed.    Prior colon cancer screening.    Review of Systems  Per HPI.  Current Outpatient Medications   Medication Sig Dispense Refill    Cyanocobalamin (VITAMIN B 12 PO) Take by mouth      ergocalciferol (VITAMIN D2) 50,000 units Take 50,000 Units by mouth      rimegepant sulfate (NURTEC) 75 mg TBDP Take 75 mg by mouth      NP Thyroid 30 MG tablet       rizatriptan (MAXALT) 10 mg tablet Take 10 mg by mouth (Patient not taking: Reported on 2024)      Ubrogepant (UBRELVY) 50 MG tablet Take 50 mg by mouth       No current facility-administered medications for this visit.       Allergies   Allergen Reactions    Penicillins Hallucinations and Other (See Comments)     hallucinations, delusions, vomiting         Past Medical History:   Diagnosis Date    Abnormal Pap smear of cervix 2011-wnl, 2021-wnl, HRHPV neg    Chronic fatigue     Disease of thyroid gland     Hashimotos    Fibromyalgia     Hashimoto's thyroiditis     History of transfusion     Miscarriage     Polycystic ovary syndrome        Past Surgical History:   Procedure Laterality Date     SECTION      X's 2; second one associated with uterine rupture    DILATION AND CURETTAGE OF UTERUS  2007    DILATION AND CURETTAGE, DIAGNOSTIC / THERAPEUTIC      missed     WISDOM TOOTH EXTRACTION         OB History          3    Para   2    Term   2            AB  "  1    Living   2         SAB   1    IAB        Ectopic        Multiple        Live Births   2           Obstetric Comments   Menarche: 15    Menses: 28/4-7/regular pad every 7-8 hours               Family History   Problem Relation Age of Onset    Breast cancer Mother          age 63    Bone cancer Mother         from breast mets    Thyroid disease Mother     Heart failure Father     No Known Problems Sister     No Known Problems Brother     Uterine cancer Maternal Grandmother     No Known Problems Maternal Grandfather     Heart disease Paternal Grandfather     Ovarian cancer Neg Hx     Colon cancer Neg Hx        The following portions of the patient's history were reviewed and updated as appropriate: allergies, current medications, past family history, past medical history, past social history, past surgical history, and problem list.      Objective:    Blood pressure 120/62, pulse 89, temperature 97.7 °F (36.5 °C), resp. rate 18, height 5' 5\" (1.651 m), weight 53.1 kg (117 lb), SpO2 97%, not currently breastfeeding.  Body mass index is 19.47 kg/m².    Physical Exam  Deferred.    Lab Results   Component Value Date     42 (H) 2024     MRI of the pelvis at [facility] 2024: Uterus 9.2 x 5.8 x 7.1 cm.  The nasal stripe 9 mm.  Heterogeneous thickened junctional zone measuring 1.6 cm.  Right ovary 4.7 x 3.7 x 4.5 cm with single hemorrhagic lesion measuring 3.7 cm.  Left ovary 2.5 x 1.1 x 3.1 cm.  Images not reviewed.    Edin Poon MD, FACOG, FACS  2024  12:17 PM      "

## 2024-06-07 LAB — COLOGUARD RESULT REPORTABLE: NEGATIVE

## 2024-06-15 PROBLEM — Z12.11 COLON CANCER SCREENING: Status: RESOLVED | Noted: 2024-05-16 | Resolved: 2024-06-15

## 2024-08-06 ENCOUNTER — TELEPHONE (OUTPATIENT)
Dept: HEMATOLOGY ONCOLOGY | Facility: CLINIC | Age: 49
End: 2024-08-06

## 2024-08-06 NOTE — TELEPHONE ENCOUNTER
Called [facility] lab to get most recent labs faxed prior to consult appointment.  They will fax today.

## 2024-08-09 ENCOUNTER — TELEPHONE (OUTPATIENT)
Dept: HEMATOLOGY ONCOLOGY | Facility: CLINIC | Age: 49
End: 2024-08-09

## 2024-08-09 ENCOUNTER — OFFICE VISIT (OUTPATIENT)
Dept: HEMATOLOGY ONCOLOGY | Facility: CLINIC | Age: 49
End: 2024-08-09
Payer: COMMERCIAL

## 2024-08-09 VITALS
RESPIRATION RATE: 16 BRPM | WEIGHT: 121 LBS | BODY MASS INDEX: 20.16 KG/M2 | OXYGEN SATURATION: 100 % | TEMPERATURE: 97.9 F | HEART RATE: 87 BPM | DIASTOLIC BLOOD PRESSURE: 68 MMHG | HEIGHT: 65 IN | SYSTOLIC BLOOD PRESSURE: 112 MMHG

## 2024-08-09 DIAGNOSIS — D50.0 IRON DEFICIENCY ANEMIA DUE TO CHRONIC BLOOD LOSS: Primary | ICD-10-CM

## 2024-08-09 PROCEDURE — 99243 OFF/OP CNSLTJ NEW/EST LOW 30: CPT

## 2024-08-09 RX ORDER — SODIUM CHLORIDE 9 MG/ML
20 INJECTION, SOLUTION INTRAVENOUS ONCE
OUTPATIENT
Start: 2024-08-23

## 2024-08-09 NOTE — PROGRESS NOTES
"  Oncology Outpatient Consult Note  Beth Dorado 49 y.o. female MRN: @ Encounter: [id number]        Date:  8/9/2024        CC:  Iron Deficiency Anemia      HPI:  Beth Dorado is seen for initial consultation 8/9/2024 regarding iron deficiency anemia.  Patient has PMH significant for Fibromyalgia, Hashimoto's htyroiditis, menorrhagia, migraines.    Patient's iron deficiency anemia was noted in March 2024 and was started on oral iron supplements.  She is not able to tolerate oral iron supplements due to nausea, constipation, abdominal pain.    Patient reports her menstrual cycles have been heavier in the past year.  She has been getting it monthly, usually lasts about 4-7 days.  She explains not all her cycles are heavy.  When they are heavy, she has about 4-5 days on the heavier side having to change her pad/tampon every 30 mins to an hour.     Patient endorses increased fatigue, hair loss, dizziness, lightheadedness, increased frequency of headaches, palpitations.  Denies CP, SOB.  Patient denies abnormal bleeding: epistaxis, gingival bleeding, hematuria, dark tarry stools.  Patient's cologuard was negative.  Patient is not a vegetarian/vegan, however, follows a no copper diet due to her fibromyalgia.  She does not smoke or drink alcohol.    Surgical history:  2 child births, second one with uterine rupture requiring PRBC transfusion  [place] teeth extraction - does not remember having prolonged bleeding    Labs:  7/17/2024: Hgb 10.3, MCV 79.7, WBC 7.9, Platelets 254,000, serum iron 42, iron saturation 11%, ferritin 6    Patient does not get mammograms, she has yearly thermograms.    ROS: As stated in history of present illness otherwise her 14 point review of systems today was negative.    ECOG PS: 0    Test Results:    Imaging: No results found.    Labs: No results found for: \"WBC\", \"HGB\", \"HCT\", \"MCV\", \"PLT\"  Lab Results   Component Value Date    K 4.5 03/15/2024     03/15/2024    CO2 26 03/15/2024    BUN 16 " 03/15/2024    CREATININE 0.45 03/15/2024    CALCIUM 8.8 03/15/2024    AST 12 03/15/2024    ALT 9 03/15/2024    ALKPHOS 42 03/15/2024    EGFR 116 03/15/2024     Active Problems:   Patient Active Problem List   Diagnosis    Visit for screening mammogram    Underweight    Hypothyroidism due to Hashimoto's thyroiditis    Herniated cervical disc    Menorrhagia with regular cycle    Ovarian cyst    Iron deficiency anemia due to chronic blood loss       Past Medical History:   Past Medical History:   Diagnosis Date    Abnormal Pap smear of cervix 2011-wnl, 2021-wnl, HRHPV neg    Chronic fatigue     Disease of thyroid gland     Hashimotos    Fibromyalgia     Hashimoto's thyroiditis     History of transfusion     Miscarriage     Polycystic ovary syndrome        Surgical History:   Past Surgical History:   Procedure Laterality Date     SECTION      X's 2; second one associated with uterine rupture    DILATION AND CURETTAGE OF UTERUS      DILATION AND CURETTAGE, DIAGNOSTIC / THERAPEUTIC      missed     WISDOM TOOTH EXTRACTION         Family History:    Family History   Problem Relation Age of Onset    Breast cancer Mother          age 63    Bone cancer Mother         from breast mets    Thyroid disease Mother     Heart failure Father     No Known Problems Sister     No Known Problems Brother     Uterine cancer Maternal Grandmother     No Known Problems Maternal Grandfather     Heart disease Paternal Grandfather     Ovarian cancer Neg Hx     Colon cancer Neg Hx        Cancer-related family history includes Bone cancer in her mother; Breast cancer in her mother; Uterine cancer in her maternal grandmother. There is no history of Ovarian cancer or Colon cancer.    Social History:   Social History     Socioeconomic History    Marital status:      Spouse name: Not on file    Number of children: 2    Years of education: Not on file    Highest education level: Master's  degree (e.g., MA, MS, Sydnee, MEd, MSW, JOSIANE)   Occupational History    Occupation: musician   Tobacco Use    Smoking status: Never    Smokeless tobacco: Never   Vaping Use    Vaping status: Never Used   Substance and Sexual Activity    Alcohol use: Not Currently    Drug use: Never    Sexual activity: Yes     Partners: Male     Birth control/protection: Coitus interruptus, Condom Male, Rhythm     Comment: lifetime partners: 5; current partner: 2020   Other Topics Concern    Not on file   Social History Narrative    Taoist: Oriental orthodox    Accepts blood products    Has h.o 1 u PRBC at time of uterine rupture                Exercise:  none    Calcium: 1 c coconut milk 2-3x/week; 1 cheese once weekly,      Social Determinants of Health     Financial Resource Strain: Not on file   Food Insecurity: Not on file   Transportation Needs: Not on file   Physical Activity: Not on file   Stress: Not on file   Social Connections: Not on file   Intimate Partner Violence: Not on file   Housing Stability: Not on file       Current Medications:   Current Outpatient Medications   Medication Sig Dispense Refill    Cyanocobalamin (VITAMIN B 12 PO) Take by mouth      NP Thyroid 30 MG tablet       rimegepant sulfate (NURTEC) 75 mg TBDP Take 75 mg by mouth      ergocalciferol (VITAMIN D2) 50,000 units Take 50,000 Units by mouth      rizatriptan (MAXALT) 10 mg tablet Take 10 mg by mouth (Patient not taking: Reported on 2/28/2024)      Ubrogepant (UBRELVY) 50 MG tablet Take 50 mg by mouth       No current facility-administered medications for this visit.       Allergies:   Allergies   Allergen Reactions    Penicillins Hallucinations and Other (See Comments)     hallucinations, delusions, vomiting           Physical Exam:    Body surface area is 1.6 meters squared.    Wt Readings from Last 3 Encounters:   08/09/24 54.9 kg (121 lb)   05/16/24 53.1 kg (117 lb)   03/28/24 51.5 kg (113 lb 9.6 oz)        Temp Readings from Last 3 Encounters:   08/09/24  97.9 °F (36.6 °C) (Temporal)   05/16/24 97.7 °F (36.5 °C)   02/28/24 (!) 97.2 °F (36.2 °C) (Tympanic)        BP Readings from Last 3 Encounters:   08/09/24 112/68   05/16/24 120/62   03/28/24 118/66         Pulse Readings from Last 3 Encounters:   08/09/24 87   05/16/24 89   02/28/24 93     @LASTSAO2(3)@    Physical Exam     Constitutional   General appearance: No acute distress, well appearing and well nourished.    Eyes   Conjunctiva and lids: No swelling, erythema or discharge.    Pupils and irises: Equal, round and reactive to light.    Ears, Nose, Mouth, and Throat   External inspection of ears and nose: Normal.    Nasal mucosa, septum, and turbinates: Normal without edema or erythema.    Oropharynx: Normal with no erythema, edema, exudate or lesions.    Pulmonary   Respiratory effort: No increased work of breathing or signs of respiratory distress.    Auscultation of lungs: Clear to auscultation.    Cardiovascular   Palpation of heart: Normal PMI, no thrills.    Auscultation of heart: Normal rate and rhythm, normal S1 and S2, without murmurs.    Examination of extremities for edema and/or varicosities: Normal.    Carotid pulses: Normal.    Abdomen   Abdomen: Non-tender, no masses.    Liver and spleen: No hepatomegaly or splenomegaly.    Lymphatic   Palpation of lymph nodes in neck: No lymphadenopathy.    Musculoskeletal   Gait and station: Normal.    Digits and nails: Normal without clubbing or cyanosis.    Inspection/palpation of joints, bones, and muscles: Normal.    Skin   Skin and subcutaneous tissue: Normal without rashes or lesions.    Neurologic   Cranial nerves: Cranial nerves 2-12 intact.    Sensation: No sensory loss.    Psychiatric   Orientation to person, place, and time: Normal.    Mood and affect: Normal.          Assessment/ Plan:  49-year-old female with iron deficiency anemia.  Discussed etiologies of iron deficiency, which include blood loss, reduced iron absorption, diet, medication,  bariatric surgery or malignancy. We discussed her iron deficiency anemia likely related to her menorrhagia.     Since she is not able to tolerate oral iron supplements, she will likely benefit from IV iron treatment, Venofer 300 mg weekly x 4 doses.  Patient educated about the iron product, administration and common adverse effects including but not limited to: Anaphylaxis/allergic reaction, arthralgias/myalgias, nausea; agrees to proceed with treatment.     We will see patient back in the office in 4 months with labs prior (CBCD, Iron Panel).  We did discuss a possible referral to GI should her counts not recover after the iron treatments.  Patient agreeable with plan.  Patient aware to contact us for worsening symptoms or for additional questions/concerns.          I spent 40 minutes in chart review, face to face counseling, coordination of care, and documentation.

## 2024-08-23 ENCOUNTER — HOSPITAL ENCOUNTER (OUTPATIENT)
Dept: ULTRASOUND IMAGING | Facility: MEDICAL CENTER | Age: 49
Discharge: HOME | End: 2024-08-23
Payer: COMMERCIAL

## 2024-08-23 ENCOUNTER — OFFICE VISIT (OUTPATIENT)
Dept: GYNECOLOGIC ONCOLOGY | Facility: CLINIC | Age: 49
End: 2024-08-23
Payer: COMMERCIAL

## 2024-08-23 VITALS
BODY MASS INDEX: 19.83 KG/M2 | TEMPERATURE: 97.7 F | SYSTOLIC BLOOD PRESSURE: 120 MMHG | OXYGEN SATURATION: 99 % | HEIGHT: 65 IN | HEART RATE: 79 BPM | WEIGHT: 119 LBS | DIASTOLIC BLOOD PRESSURE: 68 MMHG

## 2024-08-23 DIAGNOSIS — D50.0 IRON DEFICIENCY ANEMIA DUE TO CHRONIC BLOOD LOSS: ICD-10-CM

## 2024-08-23 DIAGNOSIS — N92.0 MENORRHAGIA WITH REGULAR CYCLE: ICD-10-CM

## 2024-08-23 DIAGNOSIS — N83.201 CYST OF RIGHT OVARY: ICD-10-CM

## 2024-08-23 DIAGNOSIS — N83.201 CYST OF RIGHT OVARY: Primary | ICD-10-CM

## 2024-08-23 DIAGNOSIS — N94.89 ADNEXAL MASS: ICD-10-CM

## 2024-08-23 PROBLEM — Z12.31 VISIT FOR SCREENING MAMMOGRAM: Status: RESOLVED | Noted: 2021-04-01 | Resolved: 2024-08-23

## 2024-08-23 PROCEDURE — 99215 OFFICE O/P EST HI 40 MIN: CPT | Performed by: OBSTETRICS & GYNECOLOGY

## 2024-08-23 PROCEDURE — 76830 TRANSVAGINAL US NON-OB: CPT

## 2024-08-23 PROCEDURE — 76856 US EXAM PELVIC COMPLETE: CPT

## 2024-08-23 PROCEDURE — 99459 PELVIC EXAMINATION: CPT | Performed by: OBSTETRICS & GYNECOLOGY

## 2024-08-23 RX ORDER — METRONIDAZOLE 500 MG/100ML
500 INJECTION, SOLUTION INTRAVENOUS ONCE
OUTPATIENT
Start: 2024-08-23 | End: 2024-08-23

## 2024-08-23 RX ORDER — ACETAMINOPHEN 10 MG/ML
1000 INJECTION, SOLUTION INTRAVENOUS ONCE
OUTPATIENT
Start: 2024-08-23 | End: 2024-08-23

## 2024-08-23 RX ORDER — HEPARIN SODIUM 5000 [USP'U]/ML
5000 INJECTION, SOLUTION INTRAVENOUS; SUBCUTANEOUS
OUTPATIENT
Start: 2024-08-23 | End: 2024-08-24

## 2024-08-23 RX ORDER — CEFAZOLIN SODIUM 1 G/50ML
1000 SOLUTION INTRAVENOUS ONCE
OUTPATIENT
Start: 2024-08-23 | End: 2024-08-23

## 2024-08-23 NOTE — PROGRESS NOTES
Assessment/Plan:    Problem List Items Addressed This Visit          Endocrine    Ovarian cyst - Primary     Stable on current ultrasound.  4.5 cm simple cyst.  Images compared to prior MRI from April 26, 2024.  Patient wishes to proceed with definitive surgery.  See plan under abnormal uterine bleeding.         Relevant Orders    Case request operating room: EXAM UNDER ANESTHESIA, HYSTERECTOMY LAPAROSCOPIC TOTAL (LTH) W/ ROBOTICS, UNILATERAL VS. BILATERAL SLAPINGO-OOPHORECTOMY, POSSIBLE STAGING AND ALL INDICATED PROCEDURES (Completed)    Type and screen    CBC and Platelet       Blood    Iron deficiency anemia due to chronic blood loss     Due to menorrhagia.  Patient is about to start iron infusions.  Plan to proceed with definitive hysterectomy.  See plan under menorrhagia.         Relevant Orders    Type and screen    CBC and Platelet       Obstetrics/Gynecology    Menorrhagia with regular cycle     I have independently obtained history from patient today.  Pelvic examination performed.  Attempt at endometrial biopsy was unsuccessful due to significant cervical stenosis.    I have discussed with patient differential diagnosis including benign, borderline and malignant pathology.  Given regular cycle it is unlikely that this represents a malignancy.  However, bleeding is bothersome and causing anemia.  In addition, she has a stable persistent 4.5 cm ovarian cyst.  She wishes to proceed with definitive therapy.  After counseling, she has consented to proceed with Examiner anesthesia, robotic hysterectomy, Uni versus bilateral salpingo-oophorectomy (patient wishes to preserve 1 ovary if advisable based on intraoperative findings), possible staging and all other indicated procedures.    Patient has excellent exercise tolerance and no additional cardiovascular workup is indicated.  Surgical instructions as per procedure pass.  Surgical interventions as per ERAS.    The patient was counseled regarding indications,  risks, benefits and alternatives to surgical management.  We discussed risks including but not limited to bleeding and potential need for blood transfusions, infection, pain, injury to surrounding organs such as bladder, intestines, ureters and neurovascular structures.  Patient understands potential risks associated with stress of surgery and general anesthesia including but not limited to acute cardiac events, venous thromboembolism, etc.  Patient consents for blood transfusions if those are deemed necessary during the course of care.  She understands risks include but are not limited to hypersensitivity reactions and infection with blood-borne pathogens.  Patient verbalizes understanding, agrees and wants to proceed.  Informed consent form signed. All questions answered to patient's satisfaction.             Relevant Orders    Case request operating room: EXAM UNDER ANESTHESIA, HYSTERECTOMY LAPAROSCOPIC TOTAL (LTH) W/ ROBOTICS, UNILATERAL VS. BILATERAL SLAPINGO-OOPHORECTOMY, POSSIBLE STAGING AND ALL INDICATED PROCEDURES (Completed)    Type and screen    CBC and Platelet           CHIEF COMPLAINT:   Follow-up for ovarian cyst and menorrhagia.  Patient considering definitive surgery.    Patient ID: Beth Dorado is a 49 y.o. adult  HPI  49-year-old G3, P2 premenopausal with regular heavy menstrual cycles and ovarian cyst.  Presents today for follow-up after pelvic ultrasound.  I have independently reviewed images.  Ovarian cyst remains stable.  Endometrium appears further thickened.  Prior attempted endometrial biopsy by Dr. Espinal was unsuccessful.  Patient will start iron infusions due to iron deficiency anemia related to her abnormal uterine bleeding.  She wishes to consider definitive options.    Review of Systems  Per HPI.  Current Outpatient Medications   Medication Sig Dispense Refill    Cyanocobalamin (VITAMIN B 12 PO) Take by mouth      NP Thyroid 30 MG tablet       rimegepant sulfate (NURTEC) 75 mg TBDP  Take 75 mg by mouth       No current facility-administered medications for this visit.       Allergies   Allergen Reactions    Sulfamethoxazole GI Intolerance    Trimethoprim GI Intolerance    Nitrofurantoin GI Intolerance and Nausea Only    Penicillins Hallucinations and Other (See Comments)     hallucinations, delusions, vomiting         Past Medical History:   Diagnosis Date    Abnormal Pap smear of cervix 2011-wnl, 2021-wnl, HRHPV neg    Chronic fatigue     Disease of thyroid gland     Hashimotos    Fibromyalgia     Hashimoto's thyroiditis     History of transfusion 2011    Miscarriage     Polycystic ovary syndrome        Past Surgical History:   Procedure Laterality Date     SECTION      X's 2; second one associated with uterine rupture    DILATION AND CURETTAGE OF UTERUS      DILATION AND CURETTAGE, DIAGNOSTIC / THERAPEUTIC      missed     WISDOM TOOTH EXTRACTION         OB History          3    Para   2    Term   2            AB   1    Living   2         SAB   1    IAB        Ectopic        Multiple        Live Births   2           Obstetric Comments   Menarche: 15    Menses: 28/4-7/regular pad every 7-8 hours               Family History   Problem Relation Age of Onset    Breast cancer Mother          age 63    Bone cancer Mother         from breast mets    Thyroid disease Mother     Heart failure Father     No Known Problems Sister     No Known Problems Brother     Uterine cancer Maternal Grandmother     No Known Problems Maternal Grandfather     Heart disease Paternal Grandfather     Ovarian cancer Neg Hx     Colon cancer Neg Hx        The following portions of the patient's history were reviewed and updated as appropriate: allergies, current medications, past family history, past medical history, past social history, past surgical history, and problem list.      Objective:    Blood pressure 120/68, pulse 79, temperature 97.7 °F (36.5 °C),  "temperature source Temporal, height 5' 5\" (1.651 m), weight 54 kg (119 lb), SpO2 99%, not currently breastfeeding.  Body mass index is 19.8 kg/m².    Physical Exam  Vitals reviewed. Exam conducted with a chaperone present.   Constitutional:       General: She is not in acute distress.     Appearance: Normal appearance. She is not ill-appearing or toxic-appearing.   Cardiovascular:      Rate and Rhythm: Normal rate and regular rhythm.   Pulmonary:      Effort: Pulmonary effort is normal. No respiratory distress.      Breath sounds: No stridor.   Abdominal:      General: There is no distension.      Palpations: Abdomen is soft. There is no mass.      Tenderness: There is no abdominal tenderness. There is no guarding.      Hernia: No hernia is present.   Musculoskeletal:      Right lower leg: No edema.      Left lower leg: No edema.         Endometrial biopsy attempted.  Unable to perform due to significant cervical stenosis.    I have reviewed pelvic ultrasound images and agree with radiologist interpretation.  Endometrial stripe is 18 mm with an 18 mm polypoid lesion.  Suggestion of cervical polyp.  4.5 cm simple right ovarian cyst.  No free fluid or other maladies noted.  Edin Poon MD, FACOG, FACS  8/23/2024  10:46 AM        "

## 2024-08-23 NOTE — ASSESSMENT & PLAN NOTE
Stable on current ultrasound.  4.5 cm simple cyst.  Images compared to prior MRI from April 26, 2024.  Patient wishes to proceed with definitive surgery.  See plan under abnormal uterine bleeding.

## 2024-08-23 NOTE — ASSESSMENT & PLAN NOTE
Due to menorrhagia.  Patient is about to start iron infusions.  Plan to proceed with definitive hysterectomy.  See plan under menorrhagia.

## 2024-08-23 NOTE — LETTER
August 23, 2024     Inge Espinal DO  [address] [address]    Patient: Beth Dorado   YOB: 1975   Date of Visit: 8/23/2024       Dear Dr. Espinal:    Thank you for referring Beth Dorado to me for evaluation. Below are my notes for this consultation.    If you have questions, please do not hesitate to call me. I look forward to following your patient along with you.         Sincerely,        Edin Poon MD        CC: DO Edin Beltran MD  8/23/2024 10:46 AM  Incomplete  Assessment/Plan:    Problem List Items Addressed This Visit          Endocrine    Ovarian cyst - Primary     Stable on current ultrasound.  4.5 cm simple cyst.  Images compared to prior MRI from April 26, 2024.  Patient wishes to proceed with definitive surgery.  See plan under abnormal uterine bleeding.         Relevant Orders    Case request operating room: EXAM UNDER ANESTHESIA, HYSTERECTOMY LAPAROSCOPIC TOTAL (LTH) W/ ROBOTICS, UNILATERAL VS. BILATERAL SLAPINGO-OOPHORECTOMY, POSSIBLE STAGING AND ALL INDICATED PROCEDURES (Completed)    Type and screen    CBC and Platelet       Blood    Iron deficiency anemia due to chronic blood loss     Due to menorrhagia.  Patient is about to start iron infusions.  Plan to proceed with definitive hysterectomy.  See plan under menorrhagia.         Relevant Orders    Type and screen    CBC and Platelet       Obstetrics/Gynecology    Menorrhagia with regular cycle     I have independently obtained history from patient today.  Pelvic examination performed.  Attempt at endometrial biopsy was unsuccessful due to significant cervical stenosis.    I have discussed with patient differential diagnosis including benign, borderline and malignant pathology.  Given regular cycle it is unlikely that this represents a malignancy.  However, bleeding is bothersome and causing anemia.  In addition, she has a stable persistent 4.5 cm ovarian cyst.  She wishes to proceed  with definitive therapy.  After counseling, she has consented to proceed with Examiner anesthesia, robotic hysterectomy, Uni versus bilateral salpingo-oophorectomy (patient wishes to preserve 1 ovary if advisable based on intraoperative findings), possible staging and all other indicated procedures.    Patient has excellent exercise tolerance and no additional cardiovascular workup is indicated.  Surgical instructions as per procedure pass.  Surgical interventions as per ERAS.    The patient was counseled regarding indications, risks, benefits and alternatives to surgical management.  We discussed risks including but not limited to bleeding and potential need for blood transfusions, infection, pain, injury to surrounding organs such as bladder, intestines, ureters and neurovascular structures.  Patient understands potential risks associated with stress of surgery and general anesthesia including but not limited to acute cardiac events, venous thromboembolism, etc.  Patient consents for blood transfusions if those are deemed necessary during the course of care.  She understands risks include but are not limited to hypersensitivity reactions and infection with blood-borne pathogens.  Patient verbalizes understanding, agrees and wants to proceed.  Informed consent form signed. All questions answered to patient's satisfaction.             Relevant Orders    Case request operating room: EXAM UNDER ANESTHESIA, HYSTERECTOMY LAPAROSCOPIC TOTAL (LTH) W/ ROBOTICS, UNILATERAL VS. BILATERAL SLAPINGO-OOPHORECTOMY, POSSIBLE STAGING AND ALL INDICATED PROCEDURES (Completed)    Type and screen    CBC and Platelet           CHIEF COMPLAINT:   Follow-up for ovarian cyst and menorrhagia.  Patient considering definitive surgery.    Patient ID: Beth Dorado is a 49 y.o. adult  HPI  49-year-old G3, P2 premenopausal with regular heavy menstrual cycles and ovarian cyst.  Presents today for follow-up after pelvic ultrasound.  I have  independently reviewed images.  Ovarian cyst remains stable.  Endometrium appears further thickened.  Prior attempted endometrial biopsy by Dr. Espinal was unsuccessful.  Patient will start iron infusions due to iron deficiency anemia related to her abnormal uterine bleeding.  She wishes to consider definitive options.    Review of Systems  Per HPI.  Current Outpatient Medications   Medication Sig Dispense Refill   • Cyanocobalamin (VITAMIN B 12 PO) Take by mouth     • NP Thyroid 30 MG tablet      • rimegepant sulfate (NURTEC) 75 mg TBDP Take 75 mg by mouth       No current facility-administered medications for this visit.       Allergies   Allergen Reactions   • Sulfamethoxazole GI Intolerance   • Trimethoprim GI Intolerance   • Nitrofurantoin GI Intolerance and Nausea Only   • Penicillins Hallucinations and Other (See Comments)     hallucinations, delusions, vomiting         Past Medical History:   Diagnosis Date   • Abnormal Pap smear of cervix 2011-wnl, 2021-wnl, HRHPV neg   • Chronic fatigue    • Disease of thyroid gland     Hashimotos   • Fibromyalgia    • Hashimoto's thyroiditis    • History of transfusion    • Miscarriage    • Polycystic ovary syndrome        Past Surgical History:   Procedure Laterality Date   •  SECTION      X's 2; second one associated with uterine rupture   • DILATION AND CURETTAGE OF UTERUS     • DILATION AND CURETTAGE, DIAGNOSTIC / THERAPEUTIC      missed    • WISDOM TOOTH EXTRACTION         OB History          3    Para   2    Term   2            AB   1    Living   2         SAB   1    IAB        Ectopic        Multiple        Live Births   2           Obstetric Comments   Menarche: 15    Menses: 28/4-7/regular pad every 7-8 hours               Family History   Problem Relation Age of Onset   • Breast cancer Mother          age 63   • Bone cancer Mother         from breast mets   • Thyroid disease Mother    • Heart  "failure Father    • No Known Problems Sister    • No Known Problems Brother    • Uterine cancer Maternal Grandmother    • No Known Problems Maternal Grandfather    • Heart disease Paternal Grandfather    • Ovarian cancer Neg Hx    • Colon cancer Neg Hx        The following portions of the patient's history were reviewed and updated as appropriate: allergies, current medications, past family history, past medical history, past social history, past surgical history, and problem list.      Objective:    Blood pressure 120/68, pulse 79, temperature 97.7 °F (36.5 °C), temperature source Temporal, height 5' 5\" (1.651 m), weight 54 kg (119 lb), SpO2 99%, not currently breastfeeding.  Body mass index is 19.8 kg/m².    Physical Exam  Vitals reviewed. Exam conducted with a chaperone present.   Constitutional:       General: She is not in acute distress.     Appearance: Normal appearance. She is not ill-appearing or toxic-appearing.   Cardiovascular:      Rate and Rhythm: Normal rate and regular rhythm.   Pulmonary:      Effort: Pulmonary effort is normal. No respiratory distress.      Breath sounds: No stridor.   Abdominal:      General: There is no distension.      Palpations: Abdomen is soft. There is no mass.      Tenderness: There is no abdominal tenderness. There is no guarding.      Hernia: No hernia is present.   Musculoskeletal:      Right lower leg: No edema.      Left lower leg: No edema.         Endometrial biopsy attempted.  Unable to perform due to significant cervical stenosis.    I have reviewed pelvic ultrasound images and agree with radiologist interpretation.  Endometrial stripe is 18 mm with an 18 mm polypoid lesion.  Suggestion of cervical polyp.  4.5 cm simple right ovarian cyst.  No free fluid or other maladies noted.  Edin Poon MD, FACOG, FACS  8/23/2024  10:46 AM         "

## 2024-08-23 NOTE — ASSESSMENT & PLAN NOTE
I have independently obtained history from patient today.  Pelvic examination performed.  Attempt at endometrial biopsy was unsuccessful due to significant cervical stenosis.    I have discussed with patient differential diagnosis including benign, borderline and malignant pathology.  Given regular cycle it is unlikely that this represents a malignancy.  However, bleeding is bothersome and causing anemia.  In addition, she has a stable persistent 4.5 cm ovarian cyst.  She wishes to proceed with definitive therapy.  After counseling, she has consented to proceed with Examiner anesthesia, robotic hysterectomy, Uni versus bilateral salpingo-oophorectomy (patient wishes to preserve 1 ovary if advisable based on intraoperative findings), possible staging and all other indicated procedures.    Patient has excellent exercise tolerance and no additional cardiovascular workup is indicated.  Surgical instructions as per procedure pass.  Surgical interventions as per ERAS.    The patient was counseled regarding indications, risks, benefits and alternatives to surgical management.  We discussed risks including but not limited to bleeding and potential need for blood transfusions, infection, pain, injury to surrounding organs such as bladder, intestines, ureters and neurovascular structures.  Patient understands potential risks associated with stress of surgery and general anesthesia including but not limited to acute cardiac events, venous thromboembolism, etc.  Patient consents for blood transfusions if those are deemed necessary during the course of care.  She understands risks include but are not limited to hypersensitivity reactions and infection with blood-borne pathogens.  Patient verbalizes understanding, agrees and wants to proceed.  Informed consent form signed. All questions answered to patient's satisfaction.

## 2024-08-26 ENCOUNTER — TELEPHONE (OUTPATIENT)
Dept: INFUSION CENTER | Facility: CLINIC | Age: 49
End: 2024-08-26

## 2024-08-26 NOTE — TELEPHONE ENCOUNTER
Called patient to confirm appointment for Venofer on 8/27/24 at 10:00. Discussed the location of the infusion center, projected length of appointment, visitor policy, and availability of snacks, drinks and WiFi. All questions answered.

## 2024-08-27 ENCOUNTER — HOSPITAL ENCOUNTER (OUTPATIENT)
Dept: INFUSION CENTER | Facility: CLINIC | Age: 49
Discharge: HOME | End: 2024-08-27
Payer: COMMERCIAL

## 2024-08-27 VITALS
DIASTOLIC BLOOD PRESSURE: 79 MMHG | RESPIRATION RATE: 18 BRPM | SYSTOLIC BLOOD PRESSURE: 120 MMHG | HEART RATE: 79 BPM | TEMPERATURE: 98.3 F

## 2024-08-27 DIAGNOSIS — D50.0 IRON DEFICIENCY ANEMIA DUE TO CHRONIC BLOOD LOSS: Primary | ICD-10-CM

## 2024-08-27 PROCEDURE — 96366 THER/PROPH/DIAG IV INF ADDON: CPT

## 2024-08-27 PROCEDURE — 96365 THER/PROPH/DIAG IV INF INIT: CPT

## 2024-08-27 RX ORDER — SODIUM CHLORIDE 9 MG/ML
20 INJECTION, SOLUTION INTRAVENOUS ONCE
OUTPATIENT
Start: 2024-09-03

## 2024-08-27 RX ORDER — SODIUM CHLORIDE 9 MG/ML
20 INJECTION, SOLUTION INTRAVENOUS ONCE
Status: COMPLETED | OUTPATIENT
Start: 2024-08-27 | End: 2024-08-27

## 2024-08-27 RX ADMIN — SODIUM CHLORIDE 20 ML/HR: 0.9 INJECTION, SOLUTION INTRAVENOUS at 11:17

## 2024-08-27 RX ADMIN — IRON SUCROSE 300 MG: 20 INJECTION, SOLUTION INTRAVENOUS at 11:20

## 2024-08-27 NOTE — PROGRESS NOTES
Pt arrived to unit without complaint, tolerated Venofer infusion without any adverse reaction. Pt left unit in stable condition without question or concern, AVS declined, next appt confirmed for 9/3 0900

## 2024-09-03 ENCOUNTER — HOSPITAL ENCOUNTER (OUTPATIENT)
Dept: INFUSION CENTER | Facility: CLINIC | Age: 49
Discharge: HOME | End: 2024-09-03
Payer: COMMERCIAL

## 2024-09-03 VITALS
TEMPERATURE: 98 F | HEART RATE: 78 BPM | RESPIRATION RATE: 16 BRPM | SYSTOLIC BLOOD PRESSURE: 111 MMHG | DIASTOLIC BLOOD PRESSURE: 59 MMHG

## 2024-09-03 DIAGNOSIS — D50.0 IRON DEFICIENCY ANEMIA DUE TO CHRONIC BLOOD LOSS: Primary | ICD-10-CM

## 2024-09-03 PROCEDURE — 96365 THER/PROPH/DIAG IV INF INIT: CPT

## 2024-09-03 RX ORDER — SODIUM CHLORIDE 9 MG/ML
20 INJECTION, SOLUTION INTRAVENOUS ONCE
Status: COMPLETED | OUTPATIENT
Start: 2024-09-03 | End: 2024-09-03

## 2024-09-03 RX ORDER — SODIUM CHLORIDE 9 MG/ML
20 INJECTION, SOLUTION INTRAVENOUS ONCE
OUTPATIENT
Start: 2024-09-11

## 2024-09-03 RX ADMIN — IRON SUCROSE 300 MG: 20 INJECTION, SOLUTION INTRAVENOUS at 09:55

## 2024-09-03 RX ADMIN — SODIUM CHLORIDE 20 ML/HR: 0.9 INJECTION, SOLUTION INTRAVENOUS at 09:55

## 2024-09-03 NOTE — PROGRESS NOTES
Pt presents for venofer. No new meds or concerns. Pt tolerated treatment without adverse reaction. Future appointments discussed, confirmed with patient for 9/11/2024 0929. AVS declined.

## 2024-09-11 ENCOUNTER — HOSPITAL ENCOUNTER (OUTPATIENT)
Dept: INFUSION CENTER | Facility: CLINIC | Age: 49
End: 2024-09-11

## 2024-09-25 ENCOUNTER — HOSPITAL ENCOUNTER (OUTPATIENT)
Dept: INFUSION CENTER | Facility: CLINIC | Age: 49
Discharge: HOME | End: 2024-09-25
Payer: COMMERCIAL

## 2024-09-25 VITALS
DIASTOLIC BLOOD PRESSURE: 67 MMHG | TEMPERATURE: 98.6 F | RESPIRATION RATE: 16 BRPM | SYSTOLIC BLOOD PRESSURE: 102 MMHG | HEART RATE: 64 BPM

## 2024-09-25 DIAGNOSIS — D50.0 IRON DEFICIENCY ANEMIA DUE TO CHRONIC BLOOD LOSS: Primary | ICD-10-CM

## 2024-09-25 PROCEDURE — 96365 THER/PROPH/DIAG IV INF INIT: CPT

## 2024-09-25 RX ORDER — SODIUM CHLORIDE 9 MG/ML
20 INJECTION, SOLUTION INTRAVENOUS ONCE
OUTPATIENT
Start: 2024-10-01

## 2024-09-25 RX ORDER — SODIUM CHLORIDE 9 MG/ML
20 INJECTION, SOLUTION INTRAVENOUS ONCE
Status: COMPLETED | OUTPATIENT
Start: 2024-09-25 | End: 2024-09-25

## 2024-09-25 RX ADMIN — IRON SUCROSE 300 MG: 20 INJECTION, SOLUTION INTRAVENOUS at 11:52

## 2024-09-25 RX ADMIN — SODIUM CHLORIDE 20 ML/HR: 0.9 INJECTION, SOLUTION INTRAVENOUS at 11:30

## 2024-09-25 NOTE — PROGRESS NOTES
Pt denied new symptoms or concerns today.  Pt. Tolerated Venofer without adverse event. Pt. Will return Oct 15 2024 at 1115.  Pt. Prefers to skip a week as she feels it is easier on her veins.  AVS declined.

## 2024-10-15 ENCOUNTER — HOSPITAL ENCOUNTER (OUTPATIENT)
Dept: INFUSION CENTER | Facility: CLINIC | Age: 49
End: 2024-10-15

## 2024-10-18 ENCOUNTER — HOSPITAL ENCOUNTER (OUTPATIENT)
Dept: INFUSION CENTER | Facility: CLINIC | Age: 49
End: 2024-10-18
Payer: COMMERCIAL

## 2024-10-18 VITALS
DIASTOLIC BLOOD PRESSURE: 75 MMHG | TEMPERATURE: 98.6 F | SYSTOLIC BLOOD PRESSURE: 114 MMHG | HEART RATE: 59 BPM | RESPIRATION RATE: 18 BRPM

## 2024-10-18 DIAGNOSIS — D50.0 IRON DEFICIENCY ANEMIA DUE TO CHRONIC BLOOD LOSS: Primary | ICD-10-CM

## 2024-10-18 PROCEDURE — 96365 THER/PROPH/DIAG IV INF INIT: CPT

## 2024-10-18 PROCEDURE — 96366 THER/PROPH/DIAG IV INF ADDON: CPT

## 2024-10-18 RX ORDER — SODIUM CHLORIDE 9 MG/ML
20 INJECTION, SOLUTION INTRAVENOUS ONCE
Status: CANCELLED | OUTPATIENT
Start: 2024-10-23

## 2024-10-18 RX ORDER — SODIUM CHLORIDE 9 MG/ML
20 INJECTION, SOLUTION INTRAVENOUS ONCE
Status: COMPLETED | OUTPATIENT
Start: 2024-10-18 | End: 2024-10-18

## 2024-10-18 RX ADMIN — SODIUM CHLORIDE 20 ML/HR: 0.9 INJECTION, SOLUTION INTRAVENOUS at 09:31

## 2024-10-18 RX ADMIN — IRON SUCROSE 300 MG: 20 INJECTION, SOLUTION INTRAVENOUS at 09:30

## 2024-10-18 NOTE — PLAN OF CARE
Problem: Potential for Falls  Goal: Patient will remain free of falls  Description: INTERVENTIONS:  - Educate patient/family on patient safety including physical limitations  - Instruct patient to call for assistance with activity   - Consult OT/PT to assist with strengthening/mobility   - Keep Call bell within reach  - Keep bed low and locked with side rails adjusted as appropriate  - Keep care items and personal belongings within reach  - Initiate and maintain comfort rounds  - Consider moving patient to room near nurses station  10/18/2024 1231 by Alex Ngo RN  Outcome: Completed  10/18/2024 0947 by Alex Ngo, RN  Outcome: Progressing

## 2024-10-18 NOTE — PROGRESS NOTES
Beth Dorado  tolerated final venofer therapy well with no complications.      Beth Dorado is aware she has no future appts.     AVS declined by Beth Dorado.

## 2024-12-04 ENCOUNTER — TELEPHONE (OUTPATIENT)
Age: 49
End: 2024-12-04

## 2024-12-04 NOTE — TELEPHONE ENCOUNTER
Left voicemail as a reminder to complete blood work prior to appointment with Rima on 12/12. Stated orders are in the chart and are non-fasting. Advised if she goes to a lab outside of [facility] to let us know so we can get the results. Provided call back number for questions.

## 2024-12-07 LAB
BASOPHILS # BLD AUTO: 42 CELLS/UL (ref 0–200)
BASOPHILS NFR BLD AUTO: 0.6 %
EOSINOPHIL # BLD AUTO: 161 CELLS/UL (ref 15–500)
EOSINOPHIL NFR BLD AUTO: 2.3 %
ERYTHROCYTE [DISTWIDTH] IN BLOOD BY AUTOMATED COUNT: 14.5 % (ref 11–15)
FERRITIN SERPL-MCNC: 132 NG/ML (ref 16–232)
HCT VFR BLD AUTO: 41.7 % (ref 35–45)
HGB BLD-MCNC: 13.5 G/DL (ref 11.7–15.5)
IRON SERPL-MCNC: 114 MCG/DL (ref 45–160)
LYMPHOCYTES # BLD AUTO: 2023 CELLS/UL (ref 850–3900)
LYMPHOCYTES NFR BLD AUTO: 28.9 %
MCH RBC QN AUTO: 30.1 PG (ref 27–33)
MCHC RBC AUTO-ENTMCNC: 32.4 G/DL (ref 32–36)
MCV RBC AUTO: 93.1 FL (ref 80–100)
MONOCYTES # BLD AUTO: 504 CELLS/UL (ref 200–950)
MONOCYTES NFR BLD AUTO: 7.2 %
NEUTROPHILS # BLD AUTO: 4270 CELLS/UL (ref 1500–7800)
NEUTROPHILS NFR BLD AUTO: 61 %
PLATELET # BLD AUTO: 172 THOUSAND/UL (ref 140–400)
PMV BLD REES-ECKER: 11.5 FL (ref 7.5–12.5)
RBC # BLD AUTO: 4.48 MILLION/UL (ref 3.8–5.1)
WBC # BLD AUTO: 7 THOUSAND/UL (ref 3.8–10.8)

## 2024-12-12 ENCOUNTER — OFFICE VISIT (OUTPATIENT)
Dept: HEMATOLOGY ONCOLOGY | Facility: CLINIC | Age: 49
End: 2024-12-12
Payer: COMMERCIAL

## 2024-12-12 VITALS
HEART RATE: 75 BPM | SYSTOLIC BLOOD PRESSURE: 118 MMHG | TEMPERATURE: 98.3 F | DIASTOLIC BLOOD PRESSURE: 80 MMHG | RESPIRATION RATE: 16 BRPM | BODY MASS INDEX: 20.49 KG/M2 | OXYGEN SATURATION: 98 % | WEIGHT: 123 LBS | HEIGHT: 65 IN

## 2024-12-12 DIAGNOSIS — D50.0 IRON DEFICIENCY ANEMIA DUE TO CHRONIC BLOOD LOSS: Primary | ICD-10-CM

## 2024-12-12 PROCEDURE — 99213 OFFICE O/P EST LOW 20 MIN: CPT

## 2024-12-12 NOTE — ASSESSMENT & PLAN NOTE
Orders:  •  CBC and differential; Future  •  Iron; Future  •  TIBC Panel (incl. Iron, TIBC, % Iron Saturation); Future  •  Ferritin; Future    49-year-old female with iron deficiency anemia.  Patient responded well to the iron infusions.  Since she is not able to tolerate oral iron supplements or has a specific diet to follow for fibromyalgia, she is not able to take in a lot of iron rich foods.    Discussed she is scheduled for hysterectomy in January therefore, I anticipate her counts will stabilize since the source of the iron deficiency anemia will be removed.  We will have a short-term follow-up of 3 months with labs prior (CBC, iron panel).  Patient is agreeable with this plan.  She is aware to contact us for any additional questions/concerns or worsening symptoms.

## 2024-12-12 NOTE — PROGRESS NOTES
Name: Beth Dorado      : 1975      MRN: [id number]  Encounter Provider: LAKISHA Blanton  Encounter Date: 2024   Encounter department: [facility] [place]  :  Assessment & Plan  Iron deficiency anemia due to chronic blood loss    Orders:  •  CBC and differential; Future  •  Iron; Future  •  TIBC Panel (incl. Iron, TIBC, % Iron Saturation); Future  •  Ferritin; Future    49-year-old female with iron deficiency anemia.  Patient responded well to the iron infusions.  Since she is not able to tolerate oral iron supplements or has a specific diet to follow for fibromyalgia, she is not able to take in a lot of iron rich foods.    Discussed she is scheduled for hysterectomy in January therefore, I anticipate her counts will stabilize since the source of the iron deficiency anemia will be removed.  We will have a short-term follow-up of 3 months with labs prior (CBC, iron panel).  Patient is agreeable with this plan.  She is aware to contact us for any additional questions/concerns or worsening symptoms.      History of Present Illness   Chief Complaint   Patient presents with   • Follow-up   Beth Dorado is a 49 y.o. adult who was initially seen on 2024 for iron deficiency anemia.  She has PMH significant for fibromyalgia, Hashimoto thyroiditis, menorrhagia and migraines.    Patient's iron deficiency anemia was noted in 2024 and was started on oral iron supplements.  She is not able to tolerate oral iron supplements due to nausea, constipation, abdominal pain.     The initial office visit, patient reported her menstrual cycles to be heavy, lasting about 4 to 7 days, with 4 to 5 days on the heavier side where she was changing a pad/tampon every 30 minutes to 1 hour.  Since summer, she reports that her menstrual cycles have been very light.  She is currently on her cycle and reports today she had increased bleeding and passing large clots.    Patient is status post  "IV iron treatments, Venofer 300 mg x 4 doses (8/27/2024, 9/3/2024, 9/25/2024, 10/18/2024).  Patient reports she had a difficult time with peripheral access and she had to space those infusions due to body aches after the infusions.    Patient reports significant improvement of her symptoms.  She reports her fatigue, dizziness, lightheadedness, palpitations is all improved.  Denies any CP, SOB.  Patient denies abnormal bleeding: epistaxis, gingival bleeding, hematuria, dark tarry stools.    Labs:  12/6/2024: Hgb 13.5, MCV 93.1, WBC 7.0, platelets 172,000, serum iron 2014, ferritin 132    7/17/2024: Hgb 10.3, MCV 79.7, WBC 7.9, Platelets 254,000, serum iron 42, iron saturation 11%, ferritin 6     Surgical history:  2 child births, second one with uterine rupture requiring PRBC transfusion  [place] teeth extraction - does not remember having prolonged bleeding    Review of Systems   Constitutional:  Positive for fatigue.        Improved but still present   HENT:  Negative for nosebleeds.    Eyes: Negative.    Respiratory:  Negative for chest tightness and shortness of breath.    Cardiovascular:  Negative for palpitations.   Gastrointestinal:  Negative for anal bleeding.   Genitourinary:  Negative for hematuria.   Neurological:  Positive for headaches. Negative for dizziness and light-headedness.   Hematological: Negative.            Objective   /80 (BP Location: Left arm, Patient Position: Sitting, Cuff Size: Adult)   Pulse 75   Temp 98.3 °F (36.8 °C) (Temporal)   Resp 16   Ht 5' 5\" (1.651 m)   Wt 55.8 kg (123 lb)   SpO2 98%   BMI 20.47 kg/m²     Physical Exam  Constitutional:       Appearance: Normal appearance.   HENT:      Head: Normocephalic and atraumatic.   Cardiovascular:      Rate and Rhythm: Regular rhythm.      Heart sounds: Normal heart sounds.   Pulmonary:      Breath sounds: Normal breath sounds.   Musculoskeletal:      Cervical back: Normal range of motion.      Right lower leg: No edema.      " Left lower leg: No edema.   Skin:     General: Skin is warm and dry.   Neurological:      Mental Status: She is alert and oriented to person, place, and time.   Psychiatric:         Mood and Affect: Mood normal.         Behavior: Behavior normal.         Thought Content: Thought content normal.         Judgment: Judgment normal.         Labs: I have reviewed the following labs:  Results for orders placed or performed in visit on 08/09/24   CBC and differential   Result Value Ref Range    White Blood Cell Count 7.0 3.8 - 10.8 Thousand/uL    Red Blood Cell Count 4.48 3.80 - 5.10 Million/uL    Hemoglobin 13.5 11.7 - 15.5 g/dL    HCT 41.7 35.0 - 45.0 %    MCV 93.1 80.0 - 100.0 fL    MCH 30.1 27.0 - 33.0 pg    MCHC 32.4 32.0 - 36.0 g/dL    RDW 14.5 11.0 - 15.0 %    Platelet Count 172 140 - 400 Thousand/uL    SL AMB MPV 11.5 7.5 - 12.5 fL    Neutrophils (Absolute) 4,270 1,500 - 7,800 cells/uL    Lymphocytes (Absolute) 2,023 850 - 3,900 cells/uL    Monocytes (Absolute) 504 200 - 950 cells/uL    Eosinophils (Absolute) 161 15 - 500 cells/uL    Basophils ABS 42 0 - 200 cells/uL    Neutrophils 61 %    Lymphocytes 28.9 %    Monocytes 7.2 %    Eosinophils 2.3 %    Basophils PCT 0.6 %   Result Value Ref Range    Iron, Serum 114 45 - 160 mcg/dL   Result Value Ref Range    Ferritin 132 16 - 232 ng/mL     I spent 25 minutes in chart review, counseling, coordination of care, and documentation.    This note has been generated by voice recognition software system.  Therefore, there may be spelling, grammar, and or syntax errors. Please contact if questions arise.

## 2024-12-20 ENCOUNTER — OFFICE VISIT (OUTPATIENT)
Dept: URGENT CARE | Facility: CLINIC | Age: 49
End: 2024-12-20
Payer: COMMERCIAL

## 2024-12-20 VITALS
SYSTOLIC BLOOD PRESSURE: 106 MMHG | RESPIRATION RATE: 16 BRPM | HEART RATE: 75 BPM | TEMPERATURE: 97.2 F | DIASTOLIC BLOOD PRESSURE: 66 MMHG | OXYGEN SATURATION: 100 %

## 2024-12-20 DIAGNOSIS — J06.9 ACUTE URI: ICD-10-CM

## 2024-12-20 DIAGNOSIS — H92.02 ACUTE OTALGIA, LEFT: Primary | ICD-10-CM

## 2024-12-20 PROCEDURE — 99213 OFFICE O/P EST LOW 20 MIN: CPT | Performed by: PHYSICIAN ASSISTANT

## 2024-12-20 RX ORDER — METHYLPREDNISOLONE 4 MG/1
TABLET ORAL
Qty: 1 EACH | Refills: 0 | Status: SHIPPED | OUTPATIENT
Start: 2024-12-20

## 2024-12-20 RX ORDER — OFLOXACIN 3 MG/ML
SOLUTION AURICULAR (OTIC)
Qty: 10 ML | Refills: 0 | Status: SHIPPED | OUTPATIENT
Start: 2024-12-20

## 2024-12-20 RX ORDER — PROPRANOLOL HYDROCHLORIDE 10 MG/1
10-20 TABLET ORAL
COMMUNITY
Start: 2024-09-10

## 2024-12-20 NOTE — PROGRESS NOTES
[facility]    NAME: Beth Dorado is a 49 y.o. female  : 1975    MRN: [id number]  DATE: 2024  TIME: 10:04 AM    Assessment and Plan   Acute otalgia, left [H92.02]  1. Acute otalgia, left  ofloxacin (FLOXIN) 0.3 % otic solution    methylPREDNISolone 4 MG tablet therapy pack      2. Acute URI  methylPREDNISolone 4 MG tablet therapy pack          Patient Instructions     Patient Instructions   Use eardrops as instructed.    Take prednisone as instructed.  While on prednisone do not take any ibuprofen or ibuprofen like products.  May safely take Tylenol if needed.    Warm compresses against ear may be soothing.  You may continue your over-the-counter cough congestion medication as needed.    Stay well-hydrated.    Contact your primary care provider offices soon as possible to arrange follow-up for approximately 7 to 10 days for reevaluation.    Chief Complaint     Chief Complaint   Patient presents with    Cold Like Symptoms     Pt having L ear pain since MONDAY. Pt also feels weak/run down. Pain is described as an electrical shock to the L Ear/Jaw bone. Pt started YESTERDAY with nasal congestion, coughing. Low grade fever noted yesterday. No sick contacts.        History of Present Illness   Beth Dorado presents to the clinic c/o  49-year-old female comes in complaining of left ear pain and some nasal congestion and fatigue.    Started: Monday with pain left ear that has been like an electrical shock that radiates down into her jaw.  Then yesterday little bit of a runny nose and nasal congestion.  Associated signs and symptoms: No drainage from ear or changes in hearing.  No new postnasal drip.  Does have mild cough.  Modifying factors: 2 Advil every 6 hours, Mucinex.  Known Exposures: None known.  Recent travel:  No.    Had migraine on Monday on right side of head.  History of herniated disc cervical spine.  Trigger points in the past but no surgery.    States last year about this time had  ear infection.  Says first known ear infection of her life.        Review of Systems   Review of Systems   Constitutional:  Positive for activity change, appetite change and fatigue. Negative for chills and fever.   HENT:  Positive for congestion, ear pain and postnasal drip. Negative for dental problem, ear discharge, facial swelling, sneezing and sore throat.    Eyes: Negative.    Respiratory:  Positive for cough.    Cardiovascular: Negative.    Skin:  Negative for rash.       Current Medications     Long-Term Medications   Medication Sig Dispense Refill    NP Thyroid 30 MG tablet       propranolol (INDERAL) 10 mg tablet Take 10-20 mg by mouth         Current Allergies     Allergies as of 2024 - Reviewed 2024   Allergen Reaction Noted    Sulfamethoxazole GI Intolerance 2015    Trimethoprim GI Intolerance 2015    Nitrofurantoin GI Intolerance and Nausea Only 2021    Penicillins Hallucinations and Other (See Comments) 2007          The following portions of the patient's history were reviewed and updated as appropriate: allergies, current medications, past family history, past medical history, past social history, past surgical history and problem list.  Past Medical History:   Diagnosis Date    Abnormal Pap smear of cervix 2011-wnl, 2021-wnl, HRHPV neg    Chronic fatigue     Disease of thyroid gland     Hashimotos    Fibromyalgia     Hashimoto's thyroiditis     History of transfusion     Iron deficiency anemia     Miscarriage     Polycystic ovary syndrome      Past Surgical History:   Procedure Laterality Date     SECTION      X's 2; second one associated with uterine rupture    DILATION AND CURETTAGE OF UTERUS  2007    DILATION AND CURETTAGE, DIAGNOSTIC / THERAPEUTIC      missed     WISDOM TOOTH EXTRACTION      WRIST SURGERY      exploratory     Family History   Problem Relation Age of Onset    Breast cancer Mother          age  63    Bone cancer Mother         from breast mets    Thyroid disease Mother     Heart failure Father     No Known Problems Sister     No Known Problems Brother     Uterine cancer Maternal Grandmother     No Known Problems Maternal Grandfather     Heart disease Paternal Grandfather     Ovarian cancer Neg Hx     Colon cancer Neg Hx        Objective   /66   Pulse 75   Temp (!) 97.2 °F (36.2 °C) (Tympanic)   Resp 16   SpO2 100%   No LMP recorded.       Physical Exam     Physical Exam  Vitals and nursing note reviewed.   Constitutional:       General: She is not in acute distress.     Appearance: She is well-developed. She is ill-appearing. She is not toxic-appearing or diaphoretic.      Comments: Mildly ill-appearing but in no acute distress.  No trismus or conversational dyspnea.   HENT:      Head: Normocephalic and atraumatic.      Right Ear: Tympanic membrane, ear canal and external ear normal. There is no impacted cerumen.      Left Ear: Tympanic membrane, ear canal and external ear normal. There is no impacted cerumen.      Nose: Congestion present. No rhinorrhea.      Mouth/Throat:      Mouth: Mucous membranes are moist.      Pharynx: No oropharyngeal exudate or posterior oropharyngeal erythema.   Eyes:      General: No scleral icterus.        Right eye: No discharge.         Left eye: No discharge.      Conjunctiva/sclera: Conjunctivae normal.      Pupils: Pupils are equal, round, and reactive to light.   Cardiovascular:      Rate and Rhythm: Normal rate and regular rhythm.      Heart sounds: Normal heart sounds. No murmur heard.     No friction rub. No gallop.   Pulmonary:      Effort: Pulmonary effort is normal. No respiratory distress.      Breath sounds: Normal breath sounds. No stridor. No wheezing, rhonchi or rales.   Musculoskeletal:      Cervical back: Normal range of motion and neck supple. No rigidity or tenderness.   Lymphadenopathy:      Cervical: No cervical adenopathy.   Skin:     General:  Skin is warm and dry.      Coloration: Skin is not jaundiced or pale.      Findings: No erythema or rash.   Neurological:      General: No focal deficit present.      Mental Status: She is alert and oriented to person, place, and time.   Psychiatric:         Mood and Affect: Mood normal.         Behavior: Behavior normal.

## 2024-12-20 NOTE — PATIENT INSTRUCTIONS
Use eardrops as instructed.    Take prednisone as instructed.  While on prednisone do not take any ibuprofen or ibuprofen like products.  May safely take Tylenol if needed.    Warm compresses against ear may be soothing.  You may continue your over-the-counter cough congestion medication as needed.    Stay well-hydrated.    Contact your primary care provider offices soon as possible to arrange follow-up for approximately 7 to 10 days for reevaluation.

## 2024-12-26 ENCOUNTER — APPOINTMENT (OUTPATIENT)
Dept: LAB | Facility: HOSPITAL | Age: 49
End: 2024-12-26
Payer: COMMERCIAL

## 2024-12-26 DIAGNOSIS — N83.201 CYST OF RIGHT OVARY: ICD-10-CM

## 2024-12-26 DIAGNOSIS — D50.0 IRON DEFICIENCY ANEMIA DUE TO CHRONIC BLOOD LOSS: ICD-10-CM

## 2024-12-26 DIAGNOSIS — N92.0 MENORRHAGIA WITH REGULAR CYCLE: ICD-10-CM

## 2024-12-26 LAB
ABO GROUP BLD: NORMAL
BLD GP AB SCN SERPL QL: NEGATIVE
ERYTHROCYTE [DISTWIDTH] IN BLOOD BY AUTOMATED COUNT: 12.9 % (ref 11.6–15.1)
HCT VFR BLD AUTO: 41.9 % (ref 34.8–46.1)
HGB BLD-MCNC: 13.3 G/DL (ref 11.5–15.4)
MCH RBC QN AUTO: 29.6 PG (ref 26.8–34.3)
MCHC RBC AUTO-ENTMCNC: 31.7 G/DL (ref 31.4–37.4)
MCV RBC AUTO: 93 FL (ref 82–98)
PLATELET # BLD AUTO: 204 THOUSANDS/UL (ref 149–390)
PMV BLD AUTO: 10.3 FL (ref 8.9–12.7)
RBC # BLD AUTO: 4.49 MILLION/UL (ref 3.81–5.12)
RH BLD: NEGATIVE
SPECIMEN EXPIRATION DATE: NORMAL
WBC # BLD AUTO: 4.69 THOUSAND/UL (ref 4.31–10.16)

## 2024-12-26 PROCEDURE — 86900 BLOOD TYPING SEROLOGIC ABO: CPT

## 2024-12-26 PROCEDURE — 85027 COMPLETE CBC AUTOMATED: CPT

## 2024-12-26 PROCEDURE — 86850 RBC ANTIBODY SCREEN: CPT

## 2024-12-26 PROCEDURE — 86901 BLOOD TYPING SEROLOGIC RH(D): CPT

## 2024-12-26 PROCEDURE — 36415 COLL VENOUS BLD VENIPUNCTURE: CPT

## 2025-01-06 ENCOUNTER — ANESTHESIA EVENT (OUTPATIENT)
Dept: PERIOP | Facility: HOSPITAL | Age: 50
DRG: 513 | End: 2025-01-06
Payer: COMMERCIAL

## 2025-01-06 RX ORDER — IBUPROFEN 200 MG
600 TABLET ORAL EVERY 6 HOURS PRN
COMMUNITY

## 2025-01-06 NOTE — PRE-PROCEDURE INSTRUCTIONS
Pre-Surgery Instructions:   Medication Instructions    ibuprofen (MOTRIN) 200 mg tablet Stop taking 7 days prior to surgery.    NP Thyroid 30 MG tablet Take day of surgery.    rimegepant sulfate (NURTEC) 75 mg TBDP Uses PRN- OK to take day of surgery   Medication instructions for day surgery reviewed. Please use only a sip of water to take your instructed medications. Avoid all over the counter vitamins, supplements and NSAIDS for one week prior to surgery per anesthesia guidelines. Tylenol is ok to take as needed.     You will receive a call one business day prior to surgery with an arrival time and hospital directions. If your surgery is scheduled on a Monday, the hospital will be calling you on the Friday prior to your surgery. If you have not heard from anyone by 8pm, please call the hospital supervisor through the hospital  at [phone]. ([place] [phone] or [place] [phone]).    Do not eat or drink anything after midnight the night before your surgery, including candy, mints, lifesavers, or chewing gum. Do not drink alcohol 24hrs before your surgery. Try not to smoke at least 24hrs before your surgery.       Follow the pre surgery showering instructions as listed in the “My Surgical Experience Booklet” or otherwise provided by your surgeon's office. Do not use a blade to shave the surgical area 1 week before surgery. It is okay to use a clean electric clippers up to 24 hours before surgery. Do not apply any lotions, creams, including makeup, cologne, deodorant, or perfumes after showering on the day of your surgery. Do not use dry shampoo, hair spray, hair gel, or any type of hair products.     No contact lenses, eye make-up, or artificial eyelashes. Remove nail polish, including gel polish, and any artificial, gel, or acrylic nails if possible. Remove all jewelry including rings and body piercing jewelry.     Wear causal clothing that is easy to take on and off. Consider your type of  surgery.    Keep any valuables, jewelry, piercings at home. Please bring any specially ordered equipment (sling, braces) if indicated.    Arrange for a responsible person to drive you to and from the hospital on the day of your surgery. Please confirm the visitor policy for the day of your procedure when you receive your phone call with an arrival time.     Call the surgeon's office with any new illnesses, exposures, or additional questions prior to surgery.    Please reference your “My Surgical Experience Booklet” for additional information to prepare for your upcoming surgery.

## 2025-01-09 ENCOUNTER — APPOINTMENT (OUTPATIENT)
Dept: RADIOLOGY | Facility: HOSPITAL | Age: 50
DRG: 513 | End: 2025-01-09
Payer: COMMERCIAL

## 2025-01-09 ENCOUNTER — HOSPITAL ENCOUNTER (INPATIENT)
Facility: HOSPITAL | Age: 50
LOS: 1 days | Discharge: HOME | DRG: 513 | End: 2025-01-11
Attending: OBSTETRICS & GYNECOLOGY | Admitting: OBSTETRICS & GYNECOLOGY
Payer: COMMERCIAL

## 2025-01-09 ENCOUNTER — ANESTHESIA (OUTPATIENT)
Dept: PERIOP | Facility: HOSPITAL | Age: 50
DRG: 513 | End: 2025-01-09
Payer: COMMERCIAL

## 2025-01-09 DIAGNOSIS — Z90.721 S/P HYSTERECTOMY WITH OOPHORECTOMY: Primary | ICD-10-CM

## 2025-01-09 DIAGNOSIS — N83.201 CYST OF RIGHT OVARY: Primary | ICD-10-CM

## 2025-01-09 DIAGNOSIS — Z90.710 S/P HYSTERECTOMY WITH OOPHORECTOMY: Primary | ICD-10-CM

## 2025-01-09 DIAGNOSIS — Z98.890 S/P BLADDER REPAIR: ICD-10-CM

## 2025-01-09 DIAGNOSIS — N83.201 CYST OF RIGHT OVARY: ICD-10-CM

## 2025-01-09 DIAGNOSIS — Z90.710 S/P HYSTERECTOMY WITH OOPHORECTOMY: ICD-10-CM

## 2025-01-09 DIAGNOSIS — N92.0 MENORRHAGIA WITH REGULAR CYCLE: ICD-10-CM

## 2025-01-09 DIAGNOSIS — Z90.721 S/P HYSTERECTOMY WITH OOPHORECTOMY: ICD-10-CM

## 2025-01-09 PROBLEM — R53.82 CHRONIC FATIGUE: Status: ACTIVE | Noted: 2025-01-09

## 2025-01-09 PROBLEM — Z15.89 MTHFR MUTATION: Status: ACTIVE | Noted: 2025-01-09

## 2025-01-09 LAB
ABO GROUP BLD: NORMAL
EXT PREGNANCY TEST URINE: NEGATIVE
EXT. CONTROL: NORMAL
RH BLD: NEGATIVE

## 2025-01-09 PROCEDURE — 0UT74ZZ RESECTION OF BILATERAL FALLOPIAN TUBES, PERCUTANEOUS ENDOSCOPIC APPROACH: ICD-10-PCS | Performed by: OBSTETRICS & GYNECOLOGY

## 2025-01-09 PROCEDURE — 0T768DZ DILATION OF RIGHT URETER WITH INTRALUMINAL DEVICE, VIA NATURAL OR ARTIFICIAL OPENING ENDOSCOPIC: ICD-10-PCS | Performed by: OBSTETRICS & GYNECOLOGY

## 2025-01-09 PROCEDURE — C1758 CATHETER, URETERAL: HCPCS | Performed by: OBSTETRICS & GYNECOLOGY

## 2025-01-09 PROCEDURE — 0DJD8ZZ INSPECTION OF LOWER INTESTINAL TRACT, VIA NATURAL OR ARTIFICIAL OPENING ENDOSCOPIC: ICD-10-PCS | Performed by: OBSTETRICS & GYNECOLOGY

## 2025-01-09 PROCEDURE — 0TQB4ZZ REPAIR BLADDER, PERCUTANEOUS ENDOSCOPIC APPROACH: ICD-10-PCS | Performed by: OBSTETRICS & GYNECOLOGY

## 2025-01-09 PROCEDURE — 58571 TLH W/T/O 250 G OR LESS: CPT | Performed by: OBSTETRICS & GYNECOLOGY

## 2025-01-09 PROCEDURE — NC001 PR NO CHARGE: Performed by: OBSTETRICS & GYNECOLOGY

## 2025-01-09 PROCEDURE — 8E0W4CZ ROBOTIC ASSISTED PROCEDURE OF TRUNK REGION, PERCUTANEOUS ENDOSCOPIC APPROACH: ICD-10-PCS | Performed by: OBSTETRICS & GYNECOLOGY

## 2025-01-09 PROCEDURE — S2900 ROBOTIC SURGICAL SYSTEM: HCPCS | Performed by: OBSTETRICS & GYNECOLOGY

## 2025-01-09 PROCEDURE — C2625 STENT, NON-COR, TEM W/DEL SY: HCPCS | Performed by: OBSTETRICS & GYNECOLOGY

## 2025-01-09 PROCEDURE — 88307 TISSUE EXAM BY PATHOLOGIST: CPT | Performed by: STUDENT IN AN ORGANIZED HEALTH CARE EDUCATION/TRAINING PROGRAM

## 2025-01-09 PROCEDURE — 74018 RADEX ABDOMEN 1 VIEW: CPT

## 2025-01-09 PROCEDURE — 88331 PATH CONSLTJ SURG 1 BLK 1SPC: CPT | Performed by: STUDENT IN AN ORGANIZED HEALTH CARE EDUCATION/TRAINING PROGRAM

## 2025-01-09 PROCEDURE — 0UT94ZZ RESECTION OF UTERUS, PERCUTANEOUS ENDOSCOPIC APPROACH: ICD-10-PCS | Performed by: OBSTETRICS & GYNECOLOGY

## 2025-01-09 PROCEDURE — C1769 GUIDE WIRE: HCPCS | Performed by: OBSTETRICS & GYNECOLOGY

## 2025-01-09 PROCEDURE — NC001 PR NO CHARGE: Performed by: PHYSICIAN ASSISTANT

## 2025-01-09 PROCEDURE — 81025 URINE PREGNANCY TEST: CPT | Performed by: OBSTETRICS & GYNECOLOGY

## 2025-01-09 PROCEDURE — 0TNB4ZZ RELEASE BLADDER, PERCUTANEOUS ENDOSCOPIC APPROACH: ICD-10-PCS | Performed by: OBSTETRICS & GYNECOLOGY

## 2025-01-09 PROCEDURE — 88305 TISSUE EXAM BY PATHOLOGIST: CPT | Performed by: STUDENT IN AN ORGANIZED HEALTH CARE EDUCATION/TRAINING PROGRAM

## 2025-01-09 PROCEDURE — 0UT24ZZ RESECTION OF BILATERAL OVARIES, PERCUTANEOUS ENDOSCOPIC APPROACH: ICD-10-PCS | Performed by: OBSTETRICS & GYNECOLOGY

## 2025-01-09 PROCEDURE — 52332 CYSTOSCOPY AND TREATMENT: CPT | Performed by: OBSTETRICS & GYNECOLOGY

## 2025-01-09 DEVICE — INLAY OPTIMA URETERAL STENT W/O GUIDEWIRE
Type: IMPLANTABLE DEVICE | Status: FUNCTIONAL
Brand: BARD® INLAY OPTIMA® URETERAL STENT

## 2025-01-09 RX ORDER — KETOROLAC TROMETHAMINE 30 MG/ML
15 INJECTION, SOLUTION INTRAMUSCULAR; INTRAVENOUS EVERY 6 HOURS SCHEDULED
Status: COMPLETED | OUTPATIENT
Start: 2025-01-09 | End: 2025-01-10

## 2025-01-09 RX ORDER — IBUPROFEN 600 MG/1
600 TABLET, FILM COATED ORAL EVERY 6 HOURS SCHEDULED
Status: DISCONTINUED | OUTPATIENT
Start: 2025-01-09 | End: 2025-01-09

## 2025-01-09 RX ORDER — METRONIDAZOLE 500 MG/100ML
500 INJECTION, SOLUTION INTRAVENOUS ONCE
Status: COMPLETED | OUTPATIENT
Start: 2025-01-09 | End: 2025-01-09

## 2025-01-09 RX ORDER — ACETAMINOPHEN 10 MG/ML
1000 INJECTION, SOLUTION INTRAVENOUS ONCE
Status: COMPLETED | OUTPATIENT
Start: 2025-01-09 | End: 2025-01-09

## 2025-01-09 RX ORDER — EPHEDRINE SULFATE 50 MG/ML
INJECTION INTRAVENOUS AS NEEDED
Status: DISCONTINUED | OUTPATIENT
Start: 2025-01-09 | End: 2025-01-09

## 2025-01-09 RX ORDER — HEPARIN SODIUM 5000 [USP'U]/ML
5000 INJECTION, SOLUTION INTRAVENOUS; SUBCUTANEOUS
Status: COMPLETED | OUTPATIENT
Start: 2025-01-09 | End: 2025-01-09

## 2025-01-09 RX ORDER — FENTANYL CITRATE/PF 50 MCG/ML
25 SYRINGE (ML) INJECTION
Status: DISCONTINUED | OUTPATIENT
Start: 2025-01-09 | End: 2025-01-09 | Stop reason: HOSPADM

## 2025-01-09 RX ORDER — DEXAMETHASONE SODIUM PHOSPHATE 10 MG/ML
INJECTION, SOLUTION INTRAMUSCULAR; INTRAVENOUS AS NEEDED
Status: DISCONTINUED | OUTPATIENT
Start: 2025-01-09 | End: 2025-01-09

## 2025-01-09 RX ORDER — IBUPROFEN 600 MG/1
600 TABLET, FILM COATED ORAL EVERY 6 HOURS SCHEDULED
Status: DISCONTINUED | OUTPATIENT
Start: 2025-01-10 | End: 2025-01-10

## 2025-01-09 RX ORDER — CEFAZOLIN SODIUM 1 G/50ML
1000 SOLUTION INTRAVENOUS ONCE
Status: COMPLETED | OUTPATIENT
Start: 2025-01-09 | End: 2025-01-09

## 2025-01-09 RX ORDER — HYDROMORPHONE HCL/PF 1 MG/ML
0.4 SYRINGE (ML) INJECTION
Status: DISCONTINUED | OUTPATIENT
Start: 2025-01-09 | End: 2025-01-09 | Stop reason: HOSPADM

## 2025-01-09 RX ORDER — HYDROMORPHONE HCL/PF 1 MG/ML
SYRINGE (ML) INJECTION CONTINUOUS PRN
Status: DISCONTINUED | OUTPATIENT
Start: 2025-01-09 | End: 2025-01-09

## 2025-01-09 RX ORDER — MIDAZOLAM HYDROCHLORIDE 2 MG/2ML
INJECTION, SOLUTION INTRAMUSCULAR; INTRAVENOUS AS NEEDED
Status: DISCONTINUED | OUTPATIENT
Start: 2025-01-09 | End: 2025-01-09

## 2025-01-09 RX ORDER — KETOROLAC TROMETHAMINE 30 MG/ML
15 INJECTION, SOLUTION INTRAMUSCULAR; INTRAVENOUS EVERY 6 HOURS SCHEDULED
Status: DISCONTINUED | OUTPATIENT
Start: 2025-01-09 | End: 2025-01-09

## 2025-01-09 RX ORDER — PROPOFOL 10 MG/ML
INJECTION, EMULSION INTRAVENOUS AS NEEDED
Status: DISCONTINUED | OUTPATIENT
Start: 2025-01-09 | End: 2025-01-09

## 2025-01-09 RX ORDER — ONDANSETRON 2 MG/ML
4 INJECTION INTRAMUSCULAR; INTRAVENOUS EVERY 6 HOURS PRN
Status: DISCONTINUED | OUTPATIENT
Start: 2025-01-09 | End: 2025-01-11 | Stop reason: HOSPADM

## 2025-01-09 RX ORDER — OXYCODONE HYDROCHLORIDE 5 MG/1
5 TABLET ORAL EVERY 6 HOURS PRN
Qty: 10 TABLET | Refills: 0 | Status: SHIPPED | OUTPATIENT
Start: 2025-01-09

## 2025-01-09 RX ORDER — KETOROLAC TROMETHAMINE 30 MG/ML
INJECTION, SOLUTION INTRAMUSCULAR; INTRAVENOUS AS NEEDED
Status: DISCONTINUED | OUTPATIENT
Start: 2025-01-09 | End: 2025-01-09

## 2025-01-09 RX ORDER — SODIUM CHLORIDE, SODIUM LACTATE, POTASSIUM CHLORIDE, CALCIUM CHLORIDE 600; 310; 30; 20 MG/100ML; MG/100ML; MG/100ML; MG/100ML
125 INJECTION, SOLUTION INTRAVENOUS CONTINUOUS
Status: DISCONTINUED | OUTPATIENT
Start: 2025-01-09 | End: 2025-01-09

## 2025-01-09 RX ORDER — ROCURONIUM BROMIDE 10 MG/ML
INJECTION, SOLUTION INTRAVENOUS AS NEEDED
Status: DISCONTINUED | OUTPATIENT
Start: 2025-01-09 | End: 2025-01-09

## 2025-01-09 RX ORDER — ACETAMINOPHEN 325 MG/1
650 TABLET ORAL EVERY 6 HOURS SCHEDULED
Status: DISCONTINUED | OUTPATIENT
Start: 2025-01-09 | End: 2025-01-10

## 2025-01-09 RX ORDER — ONDANSETRON 2 MG/ML
INJECTION INTRAMUSCULAR; INTRAVENOUS AS NEEDED
Status: DISCONTINUED | OUTPATIENT
Start: 2025-01-09 | End: 2025-01-09

## 2025-01-09 RX ORDER — OXYCODONE HYDROCHLORIDE 5 MG/1
5 TABLET ORAL EVERY 4 HOURS PRN
Refills: 0 | Status: DISCONTINUED | OUTPATIENT
Start: 2025-01-09 | End: 2025-01-10

## 2025-01-09 RX ORDER — LIDOCAINE HYDROCHLORIDE 20 MG/ML
INJECTION, SOLUTION EPIDURAL; INFILTRATION; INTRACAUDAL; PERINEURAL AS NEEDED
Status: DISCONTINUED | OUTPATIENT
Start: 2025-01-09 | End: 2025-01-09

## 2025-01-09 RX ORDER — FENTANYL CITRATE 50 UG/ML
INJECTION, SOLUTION INTRAMUSCULAR; INTRAVENOUS AS NEEDED
Status: DISCONTINUED | OUTPATIENT
Start: 2025-01-09 | End: 2025-01-09

## 2025-01-09 RX ORDER — PROMETHAZINE HYDROCHLORIDE 25 MG/ML
25 INJECTION, SOLUTION INTRAMUSCULAR; INTRAVENOUS ONCE AS NEEDED
Status: DISCONTINUED | OUTPATIENT
Start: 2025-01-09 | End: 2025-01-09 | Stop reason: HOSPADM

## 2025-01-09 RX ORDER — LEVOFLOXACIN 250 MG/1
250 TABLET, FILM COATED ORAL DAILY
Qty: 3 TABLET | Refills: 0 | Status: SHIPPED | OUTPATIENT
Start: 2025-01-20 | End: 2025-01-23

## 2025-01-09 RX ORDER — SODIUM CHLORIDE, SODIUM LACTATE, POTASSIUM CHLORIDE, CALCIUM CHLORIDE 600; 310; 30; 20 MG/100ML; MG/100ML; MG/100ML; MG/100ML
100 INJECTION, SOLUTION INTRAVENOUS CONTINUOUS
Status: DISCONTINUED | OUTPATIENT
Start: 2025-01-09 | End: 2025-01-10

## 2025-01-09 RX ORDER — BUPIVACAINE HYDROCHLORIDE 2.5 MG/ML
INJECTION, SOLUTION EPIDURAL; INFILTRATION; INTRACAUDAL AS NEEDED
Status: DISCONTINUED | OUTPATIENT
Start: 2025-01-09 | End: 2025-01-09 | Stop reason: HOSPADM

## 2025-01-09 RX ADMIN — SODIUM CHLORIDE, SODIUM LACTATE, POTASSIUM CHLORIDE, AND CALCIUM CHLORIDE 125 ML/HR: .6; .31; .03; .02 INJECTION, SOLUTION INTRAVENOUS at 10:15

## 2025-01-09 RX ADMIN — SODIUM CHLORIDE, SODIUM LACTATE, POTASSIUM CHLORIDE, AND CALCIUM CHLORIDE: .6; .31; .03; .02 INJECTION, SOLUTION INTRAVENOUS at 12:48

## 2025-01-09 RX ADMIN — CEFAZOLIN SODIUM 2000 MG: 1 SOLUTION INTRAVENOUS at 11:32

## 2025-01-09 RX ADMIN — PROPOFOL 150 MG: 10 INJECTION, EMULSION INTRAVENOUS at 11:25

## 2025-01-09 RX ADMIN — KETOROLAC TROMETHAMINE 15 MG: 30 INJECTION, SOLUTION INTRAMUSCULAR; INTRAVENOUS at 20:22

## 2025-01-09 RX ADMIN — FENTANYL CITRATE 100 MCG: 50 INJECTION INTRAMUSCULAR; INTRAVENOUS at 11:25

## 2025-01-09 RX ADMIN — FENTANYL CITRATE 50 MCG: 50 INJECTION INTRAMUSCULAR; INTRAVENOUS at 12:54

## 2025-01-09 RX ADMIN — HEPARIN SODIUM 5000 UNITS: 5000 INJECTION INTRAVENOUS; SUBCUTANEOUS at 10:30

## 2025-01-09 RX ADMIN — ONDANSETRON 4 MG: 2 INJECTION INTRAMUSCULAR; INTRAVENOUS at 11:32

## 2025-01-09 RX ADMIN — DEXAMETHASONE SODIUM PHOSPHATE 10 MG: 10 INJECTION INTRAMUSCULAR; INTRAVENOUS at 11:32

## 2025-01-09 RX ADMIN — SODIUM CHLORIDE, SODIUM LACTATE, POTASSIUM CHLORIDE, AND CALCIUM CHLORIDE 100 ML/HR: .6; .31; .03; .02 INJECTION, SOLUTION INTRAVENOUS at 22:03

## 2025-01-09 RX ADMIN — ONDANSETRON 4 MG: 2 INJECTION INTRAMUSCULAR; INTRAVENOUS at 22:03

## 2025-01-09 RX ADMIN — SUGAMMADEX 200 MG: 100 INJECTION, SOLUTION INTRAVENOUS at 14:39

## 2025-01-09 RX ADMIN — ACETAMINOPHEN 1000 MG: 10 INJECTION INTRAVENOUS at 18:10

## 2025-01-09 RX ADMIN — EPHEDRINE SULFATE 10 MG: 50 INJECTION INTRAVENOUS at 11:44

## 2025-01-09 RX ADMIN — PROPOFOL 20 MCG/KG/MIN: 10 INJECTION, EMULSION INTRAVENOUS at 11:32

## 2025-01-09 RX ADMIN — KETOROLAC TROMETHAMINE 15 MG: 30 INJECTION, SOLUTION INTRAMUSCULAR; INTRAVENOUS at 14:26

## 2025-01-09 RX ADMIN — ACETAMINOPHEN 1000 MG: 10 INJECTION INTRAVENOUS at 11:32

## 2025-01-09 RX ADMIN — LIDOCAINE HYDROCHLORIDE 100 MG: 20 INJECTION, SOLUTION EPIDURAL; INFILTRATION; INTRACAUDAL at 11:25

## 2025-01-09 RX ADMIN — OXYCODONE 5 MG: 5 TABLET ORAL at 16:42

## 2025-01-09 RX ADMIN — OXYCODONE 5 MG: 5 TABLET ORAL at 22:22

## 2025-01-09 RX ADMIN — FENTANYL CITRATE 50 MCG: 50 INJECTION INTRAMUSCULAR; INTRAVENOUS at 12:20

## 2025-01-09 RX ADMIN — FENTANYL CITRATE 25 MCG: 50 INJECTION INTRAMUSCULAR; INTRAVENOUS at 15:44

## 2025-01-09 RX ADMIN — MIDAZOLAM 2 MG: 1 INJECTION INTRAMUSCULAR; INTRAVENOUS at 11:13

## 2025-01-09 RX ADMIN — FENTANYL CITRATE 25 MCG: 50 INJECTION INTRAMUSCULAR; INTRAVENOUS at 15:33

## 2025-01-09 RX ADMIN — METRONIDAZOLE: 500 INJECTION, SOLUTION INTRAVENOUS at 11:32

## 2025-01-09 RX ADMIN — FENTANYL CITRATE 25 MCG: 50 INJECTION INTRAMUSCULAR; INTRAVENOUS at 15:23

## 2025-01-09 RX ADMIN — ROCURONIUM BROMIDE 50 MG: 10 INJECTION, SOLUTION INTRAVENOUS at 11:25

## 2025-01-09 RX ADMIN — ROCURONIUM BROMIDE 10 MG: 10 INJECTION, SOLUTION INTRAVENOUS at 12:54

## 2025-01-09 RX ADMIN — SODIUM CHLORIDE, SODIUM LACTATE, POTASSIUM CHLORIDE, AND CALCIUM CHLORIDE 125 ML/HR: .6; .31; .03; .02 INJECTION, SOLUTION INTRAVENOUS at 15:39

## 2025-01-09 NOTE — OP NOTE
OPERATIVE REPORT  PATIENT NAME: Beth Dorado    :  1975  MRN: [id number]  Pt Location: [facility] OR ROOM 07    SURGERY DATE: 2025    Surgeons and Role:     * Edin Poon MD - Primary     * Marianna Dominguez MD - Assisting     * Gordon Schneider PA-C - Assisting     * Devin Ambrosio MD - Fellow    Preop Diagnosis:  Cyst of right ovary [N83.201]  Menorrhagia with regular cycle [N92.0]    Post-Op Diagnosis Codes:     * Cyst of right ovary [N83.201]     * Menorrhagia with regular cycle [N92.0]    Procedure(s):  EXAM UNDER ANESTHESIA.  ROBOTIC TYPE II RADICAL HYSTERECTOMY. BILATERAL SALPINGO-OOPHORECTOMY  CYSTOSCOPY WITH RIGHT DOUBLE J (6 Fr X 24 CM) URETERAL STENT INSERTION AND 5 FR OPEN ENDED URETERAL CATHETER INSERTION (REMOVED AT THE END OF THE PROCEDURE)    Specimen(s):  ID Type Source Tests Collected by Time Destination   1 : RIGHT FALLOPIAN TUBE AND RIGHT OVARY Tissue Fallopian Tube, Right TISSUE EXAM Edin Poon MD 2025 1332    2 : LEFT FALLOPIAN TUBE AND LEFT OVARY Tissue Fallopian Tube, Left TISSUE EXAM Edin Poon MD 2025 1338    3 : UTERUS AND CERVIX Tissue Uterus TISSUE EXAM Edin Poon MD 2025 1340        Estimated Blood Loss:   200 mL    Drains:  Urethral Catheter Non-latex 16 Fr. (Active)   Number of days: 0       Anesthesia Type:   General    Operative Indications:  Cyst of right ovary [N83.201]  Menorrhagia with regular cycle [N92.0]      Operative Findings:  #1.  Frozen pelvis with approximately 14 weeks size uterus, 5 to 6 cm right ovarian mass inseparable from right pelvic sidewall/ureteral sheath and right uterosacral ligament as well as approximately 4 cm slightly enlarged left ovary with surface hemorrhagic appearance inseparable from left uterosacral ligament and posterior aspect of the uterus.  Grossly findings suggestive of stage IV endometriosis.  Densely adherent bladder was inseparable from anterior aspect of the uterus.  Unintentional  approximately 2 to 3 cm dome cystotomy was made which allowed complete mobilization of the bladder off of the anterior aspect of the uterus.  Cystotomy was closed in 2 layers and the bladder subsequently distended with approximately 200-250 mm of sterile water with documentation of watertight repair.  We plan to maintain indwelling Glover catheter for approximately 7 to 10 days and obtain cystoscopy prior to removal.  We anticipate removing ureteral stent in the outpatient setting in approximately 4 to 6 weeks.  #2.  Given extensive proximal right ureteral dilatation as well as right retroperitoneal fibrosis and need for very extensive dissection along the course of the ureter, 6 [place] 24 cm right ureteral stent was inserted.  In order to better visualize the course of the left ureter, a 5 [place] open-ended ureteral catheter was inserted but removed at the completion of the procedure.  #3.  Frozen section of right ovary demonstrated no evidence of malignancy.  Gross evaluation of the endometrium demonstrated benign appearing endometrial polyp.  #4.  Cystoscopy demonstrated normal bladder mucosa, bilateral potent ureteral urine jets, easy bilateral retrograde ureteral catheterization.    Complications:   None    Procedure and Technique:  The patient was taken to the operating room where general endotracheal anesthesia was induced without complications. The patient received antibiotic prophylaxis per hospital protocol.  Sequential compression devices were applied to the lower extremities and activated prior to induction of anesthesia. A single dose of preoperative heparin was administered. The patient was placed in the dorsolithotomy position in [place] stirrups and her lower abdomen, perineum and vagina were prepped and draped in the usual sterile fashion.  The cervix was severely stenotic and unable to be dilated/accessed.  Therefore, an EEA sizer was inserted in the vagina and a 16 [place] Glover catheter inserted in  the bladder.  The intravaginal occluder balloon was inflated.     Attention was turned to the abdomen. All port sites were infiltrated with bupivacaine at the beginning of completion of the procedure. An 8 mm skin incision was made approximately 4 cm above the umbilicus near the midline.  Then, using a 8 mm air seal trocar and the 5 mm laparoscope, the peritoneal cavity was entered under direct visualization. Good intraperitoneal location was confirmed and pneumoperitoneum was created to maximal pressure 12 mm of mercury.  A total of four 8 mm robotic trocars were placed (2 on the left, 1 in the midline replacing the 5 mm entry port and 1 on the right) and the entry trocar was reinserted in the right flank for assistant's use. The patient was placed in steep Trendelenburg and the SideTourinci system was docked.     The above-mentioned findings were noted.  The peritoneum lateral to the ovarian vessels was divided.  The ovarian vessels were skeletonized cauterized and divided with the vessel sealer device.  Dense fibrosis was noted bilaterally and in order to better delineate the course of the ureters and given finding of dilatation on the right side decision was made to proceed with cystoscopy.      Using a 30 degree 22 [place] cystoscope the bladder was examined after removal of the Glover catheter and hydrodistention.  The left ureteral orifice was cannulated with a 5 [place] open-ended ureteral catheter.  The right ureteral orifice was cannulated with a 5 [place] open-ended ureteral catheter, a sensor guidewire was advanced to the renal pelvis and after removing the open-ended catheter, a 6 [place] by 24 cm double-J ureteral stent was advanced over wire and deployed with good formation of the distal coil noted.  Cystoscope was removed and Glover catheter was reinserted.    At this point extensive dissection was necessary in order to mobilize dense adhesions from adnexal masses to pelvic sidewalls, uterus, posterior  cul-de-sac peritoneum as described.  In addition, both ureters were mobilized from the pelvic brim to the level of the ureteral tunnel which required extensive cautious dissection.  The uterine vessels were identified at the point of crossing, skeletonized, cauterized and divided using the vessel sealer device with careful attention to protect the ureter at all times.  Then, the parametria was elevated above the distal ureter which allowed complete unroofing of the distal ureters.  This allowed lateralization of the ureters and subsequent completion of the hysterectomy.  At this point we proceeded to mobilize anterior cul-de-sac peritoneum.  After incising the peritoneum, dense adhesions from bladder to anterior to the uterus were noted.  Despite attempts at intrafascial dissection, tissues were inseparable.  Therefore, intentional 2 to 3 cm cystotomy was made at the dome.  This allowed clear delineation of the bladder wall which was taken down off of the anterior aspect of the lower uterine segment and cervix.  At this point cardinal ligaments were further cauterized divided and mobilized using the vessel sealer device.  Using the EEA sizer to delineate the anterior vaginal fornix, and anterior colpotomy was made.  Colpotomy was extended circumferentially.  Then, the adnexae were  from the fundus and delivered individually and the right ovarian mass sent for frozen section as described.  The uterus and cervix were delivered through the vagina.  Frozen section/assessment by pathologist obtained as described.    At this point copious irrigation was used.  Excellent hemostasis was obtained using minimal amount of electrocautery.  The cystotomy was closed in 2 layers with 2-0 V-Loc.  The bladder was retrograde filled with approximately 200-250 mL of saline and the repair was noted to be watertight.  The vaginal cuff was closed using a running stitch of 2 oh PDO strata fix.  Excellent closure and hemostasis was  obtained.  Hemostasis was confirmed at low intraperitoneal pressures.      Trendelenburg was reversed and the pelvis was filled with saline.  The rectosigmoid was occluded proximally and distended using a rigid proctoscope.  There was no evidence of bubble leaks. Two pieces of Surgicel Nu-Knit were applied to posterior cul-de-sac and right pelvic sidewall denuded areas. The patient's cart was undocked.  Pneumoperitoneum was completely released with the use of Valsalva maneuvers.  All trocars were removed.  Laparoscopic incision was closed using subcuticular stitch of 4-0 Monocryl and Exofin.    The Glover catheter and 5 English open-ended left-sided ureteral catheter were removed.  A new Glover catheter was inserted using sterile technique.  The vaginal cuff was inspected and noted to be hemostatic.      The patient tolerated the procedure well. Sponge, lap, needle and instrument counts were reported as correct x2.  At the time of this dictation the patient remains stable in the operating room awaiting extubation.       I was present for the entire procedure    Patient Disposition:  PACU     Edin Poon MD, JOSIANE, FACOG, FACS  1/9/2025  2:44 PM

## 2025-01-09 NOTE — H&P
H&P - GYN Oncology   Name: Beth Dorado 49 y.o. female I MRN: [id number]  Unit/Bed#: OR POOL I Date of Admission: 2025   Date of Service: 2025 I Hospital Day: 0     Assessment & Plan  Menorrhagia with regular cycle  I have obtained history from patient today. No changes since seen in office.    Presents for scheduled exam under anesthesia, robotic hysterectomy, unilateral vs.bilateral salpingo-oophorectomy, possible staging and all other indicated procedures.    Preop testing results noted. Agrees to proceed to OR as planned.  Ovarian cyst  4.5 cm simple cystic lesion. Here for definitive surgical treatment Plan as per Menorrhagia section.    History of Present Illness   Chief Complaint: Here for surgery  Beth Dorado is a 49 y.o. female who presents for definitive surgery for AUB and ovarian cyst. No changes since patient seen in the office in the late Summer of .        Review of Systems   Genitourinary:  Positive for menstrual problem and pelvic pain.   All other systems reviewed and are negative.    I have reviewed the patient's PMH, PSH, Social History, Family History, Meds, and Allergies  Historical Information   Past Medical History:   Diagnosis Date    Abnormal Pap smear of cervix 2011-wnl, 2021-wnl, HRHPV neg    Anesthesia     Pt reports her mom and sister have a history of being able to wake them up after surgery    Chronic fatigue     Chronic pain disorder     cervical spine pain    Disease of thyroid gland     Hashimotos    Fibromyalgia     Hashimoto's thyroiditis     Hiatal hernia     History of transfusion     Iron deficiency anemia     Kidney stone     Miscarriage     MTHFR gene mutation     Polycystic ovary syndrome      Past Surgical History:   Procedure Laterality Date     SECTION      X's 2; second one associated with uterine rupture    DILATION AND CURETTAGE OF UTERUS  2007    DILATION AND CURETTAGE, DIAGNOSTIC / THERAPEUTIC      missed      WISDOM TOOTH EXTRACTION      WRIST SURGERY Right     exploratory     Social History     Tobacco Use    Smoking status: Never    Smokeless tobacco: Never   Vaping Use    Vaping status: Never Used   Substance and Sexual Activity    Alcohol use: Not Currently    Drug use: Never    Sexual activity: Yes     Partners: Male     Birth control/protection: Coitus interruptus, Condom Male, Rhythm     Comment: lifetime partners: 5; current partner: 2020     E-Cigarette/Vaping    E-Cigarette Use Never User      E-Cigarette/Vaping Substances    Nicotine No     THC No     CBD No     Flavoring No     Other No     Unknown No      Family history non-contributory  Oncology History:   Cancer Staging   No matching staging information was found for the patient.    Oncology History    No history exists.     Current Facility-Administered Medications   Medication Dose Route Frequency Provider Last Rate Last Admin    acetaminophen (Ofirmev) injection 1,000 mg  1,000 mg Intravenous Once Edin Poon MD        ceFAZolin (ANCEF) IVPB (premix in dextrose) 1,000 mg 50 mL  1,000 mg Intravenous Once Edin Poon MD        lactated ringers infusion  125 mL/hr Intravenous Continuous Jacoby CapDO erasmo 125 mL/hr at 01/09/25 1015 125 mL/hr at 01/09/25 1015    metroNIDAZOLE (FLAGYL) IVPB (premix) 500 mg 100 mL  500 mg Intravenous Once Edin Poon MD           Objective :  Temp:  [98.2 °F (36.8 °C)] 98.2 °F (36.8 °C)  HR:  [83] 83  BP: (117)/(63) 117/63  Resp:  [16] 16  SpO2:  [98 %] 98 %  O2 Device: None (Room air)    Physical Exam  Vitals reviewed. Exam conducted with a chaperone present.   Constitutional:       Appearance: Normal appearance. She is not ill-appearing or toxic-appearing.   Cardiovascular:      Rate and Rhythm: Normal rate and regular rhythm.   Pulmonary:      Effort: Pulmonary effort is normal. No respiratory distress.   Abdominal:      General: There is no distension.      Palpations: Abdomen is soft. There is no mass.       Tenderness: There is no abdominal tenderness. There is no guarding.      Hernia: No hernia is present.   Musculoskeletal:      Right lower leg: No edema.      Left lower leg: No edema.   Neurological:      Mental Status: She is alert.       Edin Poon MD, JOSIANE, FACOG, FACS  1/9/2025  11:23 AM

## 2025-01-09 NOTE — ANESTHESIA POSTPROCEDURE EVALUATION
Post-Op Assessment Note    CV Status:  Stable    Pain management: adequate       Mental Status:  Alert and awake   Hydration Status:  Euvolemic   PONV Controlled:  Controlled   Airway Patency:  Patent     Post Op Vitals Reviewed: Yes    No anethesia notable event occurred.    Staff: Anesthesiologist           Last Filed PACU Vitals:  Vitals Value Taken Time   Temp 99.2 °F (37.3 °C) 01/09/25 1451   Pulse 86 01/09/25 1549   /62 01/09/25 1537   Resp 18 01/09/25 1528   SpO2 98 % 01/09/25 1549   Vitals shown include unfiled device data.    Modified Nuris:     Vitals Value Taken Time   Activity 2 01/09/25 1528   Respiration 2 01/09/25 1528   Circulation 2 01/09/25 1528   Consciousness 1 01/09/25 1528   Oxygen Saturation 2 01/09/25 1528     Modified Nuris Score: 9

## 2025-01-09 NOTE — ASSESSMENT & PLAN NOTE
I have obtained history from patient today. No changes since seen in office.    Presents for scheduled exam under anesthesia, robotic hysterectomy, unilateral vs.bilateral salpingo-oophorectomy, possible staging and all other indicated procedures.    Preop testing results noted. Agrees to proceed to OR as planned.

## 2025-01-09 NOTE — DISCHARGE INSTR - AVS FIRST PAGE
[facility] - Gynecologic Oncology  Ayah Sandoval, Jason Leonard  [phone]    Exam under anesthesia, robotic type II radical hysterectomy, bilateral salpingo-oophorectomy, cystoscopy with left ureteral catheter insertion (removed at the end of the procedure) and insertion of right ureteral stent (remained at the end of the procedure).    Discharge Instructions    WHAT YOU NEED TO KNOW:   Today we removed your uterus, cervix and bilateral fallopian tubes and ovaries.  You had a mass in your right ovary and all findings suggestive of significant/severe endometriosis and pelvic scarring.  This is the cause of your severe pain.  It was impossible to preserve the left ovary as it was inseparable from the uterus.  In order to safely remove all organs, we had to do a radical hysterectomy which requires mobilization of the ureters.  Your right ureter was very densely adherent to the right ovarian mass and therefore I left in place ureteral stent which will be removed in the outpatient setting in approximately 4 to 6 weeks.    In addition, in order to safely mobilize the bladder from the anterior uterus, we had to do a small incision in the bladder.  This was repaired.  A catheter will remain in your bladder for approximately 7 to 10 days.  My office will call you to schedule a cystogram to document complete healing and removal of the catheter.    We sent uterus and ovarian mass to pathology and there was no evidence of malignancy.    Your procedures were uncomplicated.    DISCHARGE INSTRUCTIONS:     Ice packs to abdominal incisions for the first 24 to 48 hours to manage discomfort and pain.    If your pain is not controlled with the measures recommended below contact Dr. Poon directly on his cell phone at [phone].  For other questions or concerns call the office day or night at [phone].    Contact your doctor at the number above if:   You have a fever over 101o.  You  have nausea or are vomiting that does not improve after a light meal.   Your pain is getting worse, even after you take medicine.   You feel pain or burning when you urinate, or you have trouble urinating.   You have pus or a foul-smelling odor coming from your vagina and/or wound.    Your wound is red, swollen, or draining pus.  You see new or an increased amount of bright red blood coming from your vagina or your incisions.   You have questions or concerns about your condition or care.    Seek care immediately:   Your arm or leg feels warm, tender, and painful. It may look swollen and red.  You have increasing abdominal or pelvic pain.   You have heavy vaginal bleeding that fills 1 or more sanitary pads in 1 hour.    Call 911 for any of the following:   You feel lightheaded, short of breath, and have chest pain.   You cough up blood.    Medicines: You may need any of the following:  Oxycodone 5 mg tablets: Take 1 tablet by mouth every 6 hours only if needed for severe pain.  This medication is highly addictive and should be taking sparingly.  It can cause significant constipation.  Please follow directions provided below to prevent and/or treat constipation.   Levofloxacin 250 mg tablets: This medication is to be started approximately 2 to 3 hours prior to your cystogram and then taken daily for total of 3 days.  Do not start this medication now.  This medication is to prevent infection related to cystogram.  Resume your previous medications as directed.  Extra-strength Tylenol:  This medications over-the-counter.  Take 2 tablets every 6 hours as needed for mild-to-moderate pain.  Ibuprofen/Advil/Motrin 200 mg tablets:  This medication is over-the-counter.  Take 3 tablets every 8 hours as needed for moderate to severe pain.  Take this medication with food.  The drink plenty of fluids while using this medication to prevent kidney injury.  Metamucil or MiraLax:  This product is over-the-counter.  Distal 1 large tbsp  in a large glass of water.  Use once or twice a day for 1-2 weeks prevent and or treat constipation.    Take your medicines as directed. Contact your healthcare provider if you think your medicine is not helping or if you have side effects. Tell him or her if you are allergic to any medicine. Keep a list of the medicines, vitamins, and herbs you take. Include the amounts, and when and why you take them. Bring the list or the pill bottles to follow-up visits. Carry your medicine list with you in case of an emergency.    Activity:   Rest as needed. Get up and move around as directed to help prevent blood clots. Start with short walks and slowly increase the distance every day. Limit the number of times you climb stairs to 2 times each day for the first week. Plan most of your daily activities on one level of your home.      Do not lift objects heavier than 10 pounds until seen in the office for your follow-up appointment.      Do not strain during bowel movements. High-fiber foods and extra liquids can help you prevent constipation. Examples of high-fiber foods are fruit and bran. Prune juice and water are good liquids to drink.      Do not have sex, use tampons, or douche and do not do tub baths/swimming until cleared by your physician at your postoperative appointment.    You may shower as soon as the day after surgery.  Do not go into pools or hot tubs until cleared by your doctor.      Ask when it is safe for you to drive. It is generally safe to drive as soon as your legs are strong, you are off pain medicines and you feel like you will have the appropriate reflexes to step on the breaks in case of an emergency.    Ask when you may return to work and to other regular activities.    Wound care: Care for your abdominal incisions as directed. Carefully wash around the wound with soap and water. If you have Hibiclens or medicated soap that you were instructed to use before surgery, you may use that to wash with for  up to 2 days after surgery.  If not, any mild non-scented, non-abrasive soap is safe.  It is okay to let the soap and water run over your incision. Do not scrub your incision. Dry the area and put on new, clean bandages as directed. Change your bandages when they get wet or dirty. If you have strips of medical tape, let them fall off on their own. It may take 7 to 14 days for them to fall off. Check your incision every day for redness, swelling, or pus.   Deep breathing: Take deep breaths and cough 10 times each hour. This will decrease your risk for a lung infection. Take a deep breath and hold it for as long as you can. Let the air out and then cough strongly. Deep breaths help open your airway. You may be given an incentive spirometer to help you take deep breaths. Put the plastic piece in your mouth and take a slow, deep breath, then let the air out and cough. Repeat these steps 10 times every hour.   Get support: This surgery may be life-changing for you and your family. You will no longer be able to get pregnant. Sudden changes in the levels of your hormones may occur and cause mood swings and depression. You may feel angry, sad, or frightened, or cry frequently and unexpectedly. These feelings are normal. Talk to your healthcare provider about where you can get support. You can also ask if hormone replacement medicine is right for you.   Follow up with your healthcare provider or gynecologist as directed: You may need to return to have stitches removed, and for other tests. Write down your questions so you remember to ask them during your visits.      © 2017 Information [employer] Information is for End User's use only and may not be sold, redistributed or otherwise used for commercial purposes. All illustrations and images included in CareNotes® are the copyrighted property of [employer].D.A[employer] or [employer].  The above information is an  only. It is not intended as medical  advice for individual conditions or treatments. Talk to your doctor, nurse or pharmacist before following any medical regimen to see if it is safe and effective for you.

## 2025-01-09 NOTE — ANESTHESIA PREPROCEDURE EVALUATION
Procedure:  ROBOTIC HYSTERECTOMY, UNI VS BSO, PSB STAGING, EUA (Abdomen)    Relevant Problems   ENDO   (+) Hypothyroidism due to Hashimoto's thyroiditis      HEMATOLOGY   (+) Iron deficiency anemia due to chronic blood loss      Endocrine   (+) MTHFR mutation      Other   (+) Chronic fatigue        Physical Exam    Airway    Mallampati score: II  TM Distance: >3 FB  Neck ROM: full     Dental   No notable dental hx     Cardiovascular  Rhythm: regular, Rate: normal, Cardiovascular exam normal    Pulmonary  Pulmonary exam normal Breath sounds clear to auscultation    Other Findings  post-pubertal.      Anesthesia Plan  ASA Score- 2     Anesthesia Type- general with ASA Monitors.         Additional Monitors:     Airway Plan: ETT.    Comment: Consented for TAP blocks if open Sx..       Plan Factors-    Chart reviewed.   Existing labs reviewed. Patient summary reviewed.                  Induction- intravenous.    Postoperative Plan- Plan for postoperative opioid use.         Informed Consent- Anesthetic plan and risks discussed with patient.

## 2025-01-09 NOTE — ASSESSMENT & PLAN NOTE
4.5 cm simple cystic lesion. Here for definitive surgical treatment Plan as per Menorrhagia section.

## 2025-01-10 PROBLEM — Z90.710 S/P HYSTERECTOMY WITH OOPHORECTOMY: Chronic | Status: ACTIVE | Noted: 2025-01-09

## 2025-01-10 PROBLEM — Z90.721 S/P HYSTERECTOMY WITH OOPHORECTOMY: Chronic | Status: ACTIVE | Noted: 2025-01-09

## 2025-01-10 LAB
ANION GAP SERPL CALCULATED.3IONS-SCNC: 6 MMOL/L (ref 4–13)
BUN SERPL-MCNC: 13 MG/DL (ref 5–25)
CALCIUM SERPL-MCNC: 8.3 MG/DL (ref 8.4–10.2)
CHLORIDE SERPL-SCNC: 107 MMOL/L (ref 96–108)
CO2 SERPL-SCNC: 27 MMOL/L (ref 21–32)
CREAT SERPL-MCNC: 0.61 MG/DL (ref 0.6–1.3)
ERYTHROCYTE [DISTWIDTH] IN BLOOD BY AUTOMATED COUNT: 13.3 % (ref 11.6–15.1)
GFR SERPL CREATININE-BSD FRML MDRD: 105 ML/MIN/1.73SQ M
GLUCOSE SERPL-MCNC: 109 MG/DL (ref 65–140)
HCT VFR BLD AUTO: 35.1 % (ref 34.8–46.1)
HGB BLD-MCNC: 11.6 G/DL (ref 11.5–15.4)
MCH RBC QN AUTO: 30.5 PG (ref 26.8–34.3)
MCHC RBC AUTO-ENTMCNC: 33 G/DL (ref 31.4–37.4)
MCV RBC AUTO: 92 FL (ref 82–98)
PLATELET # BLD AUTO: 167 THOUSANDS/UL (ref 149–390)
PMV BLD AUTO: 10.5 FL (ref 8.9–12.7)
POTASSIUM SERPL-SCNC: 3.6 MMOL/L (ref 3.5–5.3)
RBC # BLD AUTO: 3.8 MILLION/UL (ref 3.81–5.12)
SODIUM SERPL-SCNC: 140 MMOL/L (ref 135–147)
WBC # BLD AUTO: 11.2 THOUSAND/UL (ref 4.31–10.16)

## 2025-01-10 PROCEDURE — 80048 BASIC METABOLIC PNL TOTAL CA: CPT

## 2025-01-10 PROCEDURE — 85027 COMPLETE CBC AUTOMATED: CPT

## 2025-01-10 PROCEDURE — 99024 POSTOP FOLLOW-UP VISIT: CPT | Performed by: OBSTETRICS & GYNECOLOGY

## 2025-01-10 RX ORDER — KETOROLAC TROMETHAMINE 30 MG/ML
15 INJECTION, SOLUTION INTRAMUSCULAR; INTRAVENOUS EVERY 6 HOURS PRN
Status: DISCONTINUED | OUTPATIENT
Start: 2025-01-10 | End: 2025-01-10

## 2025-01-10 RX ORDER — IBUPROFEN 600 MG/1
600 TABLET, FILM COATED ORAL EVERY 6 HOURS SCHEDULED
Status: DISCONTINUED | OUTPATIENT
Start: 2025-01-10 | End: 2025-01-11 | Stop reason: HOSPADM

## 2025-01-10 RX ORDER — HYDROMORPHONE HCL/PF 1 MG/ML
0.5 SYRINGE (ML) INJECTION
Refills: 0 | Status: DISCONTINUED | OUTPATIENT
Start: 2025-01-10 | End: 2025-01-10

## 2025-01-10 RX ORDER — ACETAMINOPHEN 325 MG/1
975 TABLET ORAL EVERY 8 HOURS SCHEDULED
Status: DISCONTINUED | OUTPATIENT
Start: 2025-01-10 | End: 2025-01-11 | Stop reason: HOSPADM

## 2025-01-10 RX ORDER — OXYCODONE HYDROCHLORIDE 5 MG/1
5 TABLET ORAL EVERY 4 HOURS PRN
Refills: 0 | Status: DISCONTINUED | OUTPATIENT
Start: 2025-01-10 | End: 2025-01-11 | Stop reason: HOSPADM

## 2025-01-10 RX ORDER — OXYCODONE HYDROCHLORIDE 10 MG/1
10 TABLET ORAL EVERY 4 HOURS PRN
Refills: 0 | Status: DISCONTINUED | OUTPATIENT
Start: 2025-01-10 | End: 2025-01-10

## 2025-01-10 RX ORDER — OXYBUTYNIN CHLORIDE 5 MG/1
5 TABLET ORAL 3 TIMES DAILY PRN
Status: DISCONTINUED | OUTPATIENT
Start: 2025-01-10 | End: 2025-01-11 | Stop reason: HOSPADM

## 2025-01-10 RX ORDER — OXYCODONE HYDROCHLORIDE 5 MG/1
5 TABLET ORAL EVERY 4 HOURS PRN
Refills: 0 | Status: DISCONTINUED | OUTPATIENT
Start: 2025-01-10 | End: 2025-01-10

## 2025-01-10 RX ORDER — HEPARIN SODIUM 5000 [USP'U]/ML
5000 INJECTION, SOLUTION INTRAVENOUS; SUBCUTANEOUS EVERY 8 HOURS SCHEDULED
Status: DISCONTINUED | OUTPATIENT
Start: 2025-01-10 | End: 2025-01-11 | Stop reason: HOSPADM

## 2025-01-10 RX ORDER — ACETAMINOPHEN 10 MG/ML
1000 INJECTION, SOLUTION INTRAVENOUS EVERY 8 HOURS
Status: DISCONTINUED | OUTPATIENT
Start: 2025-01-10 | End: 2025-01-10

## 2025-01-10 RX ORDER — HYDROMORPHONE HCL IN WATER/PF 6 MG/30 ML
0.2 PATIENT CONTROLLED ANALGESIA SYRINGE INTRAVENOUS
Status: DISCONTINUED | OUTPATIENT
Start: 2025-01-10 | End: 2025-01-11 | Stop reason: HOSPADM

## 2025-01-10 RX ADMIN — OXYBUTYNIN CHLORIDE 5 MG: 5 TABLET ORAL at 09:31

## 2025-01-10 RX ADMIN — OXYCODONE 5 MG: 5 TABLET ORAL at 23:24

## 2025-01-10 RX ADMIN — ACETAMINOPHEN 650 MG: 325 TABLET, FILM COATED ORAL at 06:49

## 2025-01-10 RX ADMIN — OXYCODONE 5 MG: 5 TABLET ORAL at 18:32

## 2025-01-10 RX ADMIN — ACETAMINOPHEN 975 MG: 325 TABLET, FILM COATED ORAL at 23:17

## 2025-01-10 RX ADMIN — ACETAMINOPHEN 650 MG: 325 TABLET, FILM COATED ORAL at 00:34

## 2025-01-10 RX ADMIN — ACETAMINOPHEN 1000 MG: 10 INJECTION INTRAVENOUS at 15:07

## 2025-01-10 RX ADMIN — KETOROLAC TROMETHAMINE 15 MG: 30 INJECTION, SOLUTION INTRAMUSCULAR; INTRAVENOUS at 06:49

## 2025-01-10 RX ADMIN — OXYBUTYNIN CHLORIDE 5 MG: 5 TABLET ORAL at 02:35

## 2025-01-10 RX ADMIN — OXYBUTYNIN CHLORIDE 5 MG: 5 TABLET ORAL at 18:33

## 2025-01-10 RX ADMIN — SODIUM CHLORIDE, SODIUM LACTATE, POTASSIUM CHLORIDE, AND CALCIUM CHLORIDE 100 ML/HR: .6; .31; .03; .02 INJECTION, SOLUTION INTRAVENOUS at 06:49

## 2025-01-10 RX ADMIN — OXYCODONE HYDROCHLORIDE 10 MG: 10 TABLET ORAL at 10:47

## 2025-01-10 RX ADMIN — HEPARIN SODIUM 5000 UNITS: 5000 INJECTION INTRAVENOUS; SUBCUTANEOUS at 21:34

## 2025-01-10 RX ADMIN — IBUPROFEN 600 MG: 600 TABLET, FILM COATED ORAL at 21:34

## 2025-01-10 RX ADMIN — OXYCODONE 5 MG: 5 TABLET ORAL at 06:49

## 2025-01-10 RX ADMIN — KETOROLAC TROMETHAMINE 15 MG: 30 INJECTION, SOLUTION INTRAMUSCULAR; INTRAVENOUS at 15:51

## 2025-01-10 RX ADMIN — KETOROLAC TROMETHAMINE 15 MG: 30 INJECTION, SOLUTION INTRAMUSCULAR; INTRAVENOUS at 00:34

## 2025-01-10 NOTE — PLAN OF CARE
Problem: PAIN - ADULT  Goal: Verbalizes/displays adequate comfort level or baseline comfort level  Description: Interventions:  - Encourage patient to monitor pain and request assistance  - Assess pain using appropriate pain scale  - Administer analgesics based on type and severity of pain and evaluate response  - Implement non-pharmacological measures as appropriate and evaluate response  - Consider cultural and social influences on pain and pain management  - Notify physician/advanced practitioner if interventions unsuccessful or patient reports new pain  Outcome: Progressing     Problem: INFECTION - ADULT  Goal: Absence or prevention of progression during hospitalization  Description: INTERVENTIONS:  - Assess and monitor for signs and symptoms of infection  - Monitor lab/diagnostic results  - Monitor all insertion sites, i.e. indwelling lines, tubes, and drains  - Monitor endotracheal if appropriate and nasal secretions for changes in amount and color  - [place] appropriate cooling/warming therapies per order  - Administer medications as ordered  - Instruct and encourage patient and family to use good hand hygiene technique  - Identify and instruct in appropriate isolation precautions for identified infection/condition  Outcome: Progressing  Goal: Absence of fever/infection during neutropenic period  Description: INTERVENTIONS:  - Monitor WBC    Outcome: Progressing     Problem: SAFETY ADULT  Goal: Patient will remain free of falls  Description: INTERVENTIONS:  - Educate patient/family on patient safety including physical limitations  - Instruct patient to call for assistance with activity   - Consult OT/PT to assist with strengthening/mobility   - Keep Call bell within reach  - Keep bed low and locked with side rails adjusted as appropriate  - Keep care items and personal belongings within reach  - Initiate and maintain comfort rounds  - Make Fall Risk Sign visible to staff  - Offer Toileting every 3 Hours,  in advance of need  - Initiate/Maintain bed alarm  - Obtain necessary fall risk management equipment  - Apply yellow socks and bracelet for high fall risk patients  - Consider moving patient to room near nurses station  Outcome: Progressing  Goal: Maintain or return to baseline ADL function  Description: INTERVENTIONS:  -  Assess patient's ability to carry out ADLs; assess patient's baseline for ADL function and identify physical deficits which impact ability to perform ADLs (bathing, care of mouth/teeth, toileting, grooming, dressing, etc.)  - Assess/evaluate cause of self-care deficits   - Assess range of motion  - Assess patient's mobility; develop plan if impaired  - Assess patient's need for assistive devices and provide as appropriate  - Encourage maximum independence but intervene and supervise when necessary  - Involve family in performance of ADLs  - Assess for home care needs following discharge   - Consider OT consult to assist with ADL evaluation and planning for discharge  - Provide patient education as appropriate  Outcome: Progressing  Goal: Maintains/Returns to pre admission functional level  Description: INTERVENTIONS:  - Perform AM-PAC 6 Click Basic Mobility/ Daily Activity assessment daily.  - Set and communicate daily mobility goal to care team and patient/family/caregiver.   - Collaborate with rehabilitation services on mobility goals if consulted  - Perform Range of Motion 3 times a day.  - Reposition patient every 3 hours.  - Dangle patient 3 times a day  - Stand patient 3 times a day  - Ambulate patient 3 times a day  - Out of bed to chair 3 times a day   - Out of bed for meals 3 times a day  - Out of bed for toileting  - Record patient progress and toleration of activity level   Outcome: Progressing     Problem: DISCHARGE PLANNING  Goal: Discharge to home or other facility with appropriate resources  Description: INTERVENTIONS:  - Identify barriers to discharge w/patient and caregiver  -  Arrange for needed discharge resources and transportation as appropriate  - Identify discharge learning needs (meds, wound care, etc.)  - Arrange for interpretive services to assist at discharge as needed  - Refer to Case Management Department for coordinating discharge planning if the patient needs post-hospital services based on physician/advanced practitioner order or complex needs related to functional status, cognitive ability, or social support system  Outcome: Progressing     Problem: Knowledge Deficit  Goal: Patient/family/caregiver demonstrates understanding of disease process, treatment plan, medications, and discharge instructions  Description: Complete learning assessment and assess knowledge base.  Interventions:  - Provide teaching at level of understanding  - Provide teaching via preferred learning methods  Outcome: Progressing

## 2025-01-10 NOTE — PLAN OF CARE
Problem: PAIN - ADULT  Goal: Verbalizes/displays adequate comfort level or baseline comfort level  Description: Interventions:  - Encourage patient to monitor pain and request assistance  - Assess pain using appropriate pain scale  - Administer analgesics based on type and severity of pain and evaluate response  - Implement non-pharmacological measures as appropriate and evaluate response  - Consider cultural and social influences on pain and pain management  - Notify physician/advanced practitioner if interventions unsuccessful or patient reports new pain  Outcome: Progressing     Problem: INFECTION - ADULT  Goal: Absence or prevention of progression during hospitalization  Description: INTERVENTIONS:  - Assess and monitor for signs and symptoms of infection  - Monitor lab/diagnostic results  - Monitor all insertion sites, i.e. indwelling lines, tubes, and drains  - Monitor endotracheal if appropriate and nasal secretions for changes in amount and color  - [place] appropriate cooling/warming therapies per order  - Administer medications as ordered  - Instruct and encourage patient and family to use good hand hygiene technique  - Identify and instruct in appropriate isolation precautions for identified infection/condition  Outcome: Progressing  Goal: Absence of fever/infection during neutropenic period  Description: INTERVENTIONS:  - Monitor WBC    Outcome: Progressing     Problem: SAFETY ADULT  Goal: Patient will remain free of falls  Description: INTERVENTIONS:  - Educate patient/family on patient safety including physical limitations  - Instruct patient to call for assistance with activity   - Consult OT/PT to assist with strengthening/mobility   - Keep Call bell within reach  - Keep bed low and locked with side rails adjusted as appropriate  - Keep care items and personal belongings within reach  - Initiate and maintain comfort rounds  - Make Fall Risk Sign visible to staff  - Offer Toileting every  Hours,  in advance of need  - Initiate/Maintain bed alarm  - Obtain necessary fall risk management equipment: socks   - Apply yellow socks and bracelet for high fall risk patients  - Consider moving patient to room near nurses station  Outcome: Progressing  Goal: Maintain or return to baseline ADL function  Description: INTERVENTIONS:  -  Assess patient's ability to carry out ADLs; assess patient's baseline for ADL function and identify physical deficits which impact ability to perform ADLs (bathing, care of mouth/teeth, toileting, grooming, dressing, etc.)  - Assess/evaluate cause of self-care deficits   - Assess range of motion  - Assess patient's mobility; develop plan if impaired  - Assess patient's need for assistive devices and provide as appropriate  - Encourage maximum independence but intervene and supervise when necessary  - Involve family in performance of ADLs  - Assess for home care needs following discharge   - Consider OT consult to assist with ADL evaluation and planning for discharge  - Provide patient education as appropriate  Outcome: Progressing  Goal: Maintains/Returns to pre admission functional level  Description: INTERVENTIONS:  - Perform AM-PAC 6 Click Basic Mobility/ Daily Activity assessment daily.  - Set and communicate daily mobility goal to care team and patient/family/caregiver.   - Collaborate with rehabilitation services on mobility goals if consulted  - Perform Range of Motion 4 times a day.  - Reposition patient every 2 hours.  - Dangle patient 3 times a day  - Stand patient 3 times a day  - Ambulate patient 3 times a day  - Out of bed to chair 3 times a day   - Out of bed for meals 3 times a day  - Out of bed for toileting  - Record patient progress and toleration of activity level   Outcome: Progressing     Problem: DISCHARGE PLANNING  Goal: Discharge to home or other facility with appropriate resources  Description: INTERVENTIONS:  - Identify barriers to discharge w/patient and  caregiver  - Arrange for needed discharge resources and transportation as appropriate  - Identify discharge learning needs (meds, wound care, etc.)  - Arrange for interpretive services to assist at discharge as needed  - Refer to Case Management Department for coordinating discharge planning if the patient needs post-hospital services based on physician/advanced practitioner order or complex needs related to functional status, cognitive ability, or social support system  Outcome: Progressing     Problem: Knowledge Deficit  Goal: Patient/family/caregiver demonstrates understanding of disease process, treatment plan, medications, and discharge instructions  Description: Complete learning assessment and assess knowledge base.  Interventions:  - Provide teaching at level of understanding  - Provide teaching via preferred learning methods  Outcome: Progressing

## 2025-01-10 NOTE — ASSESSMENT & PLAN NOTE
Follow-up final surgical pathology  Pain controlled, will discharge home with scheduled Tylenol/Motrin and as needed Roxicodone  Oxybutynin PRN for bladder spasms    Outpatient cystogram ordered for 1/20/2025 with plan for Glover removal if normal, has outpatient follow-up scheduled with Dr. Poon and will plan to remove ureteral stent in 4 to 6 weeks

## 2025-01-10 NOTE — PROGRESS NOTES
"Gyn Oncology Progress note   Beth Dorado 49 y.o. female MRN: [id number]  Unit/Bed#: E5 -01 Encounter: [id number]    Assessment/Plan:  49 y.o. POD# 1 from robotic-assisted type II radical hysterectomy, bilateral salpingo-oophorectomy, placement of right ureteral stent, cystoscopy. Admitted for pain control.    * S/P hysterectomy with oophorectomy  Assessment & Plan  POD1. Admitted overnight for pain control; pain remains poorly controlled  - Tylenol scheduled  - Toradol PRN  - Roxicodone PRN  - Dilaudid breakthrough pain  - Oxybutynin PRN       Subjective:  Beth Dorado reports she is still in significant pain. She is currently passing flatus. Glover is in place with adequate output. She is tolerating PO, and denies nausea or vomiting.     Objective:  /68 (BP Location: Left arm)   Pulse 81   Temp 98.8 °F (37.1 °C) (Oral)   Resp 16   Ht 5' 5\" (1.651 m)   Wt 54.8 kg (120 lb 13 oz)   LMP 12/12/2024 (Exact Date)   SpO2 99%   BMI 20.10 kg/m²     I/O last 3 completed shifts:  In: 2900 [I.V.:2700; IV Piggyback:200]  Out: 4900 [Urine:4700; Blood:200]  No intake/output data recorded.    Lab Results   Component Value Date    WBC 4.69 12/26/2024    HGB 13.3 12/26/2024    HCT 41.9 12/26/2024    MCV 93 12/26/2024     12/26/2024     Lab Results   Component Value Date    CALCIUM 8.8 03/15/2024    K 4.5 03/15/2024    CO2 26 03/15/2024     03/15/2024    BUN 16 03/15/2024    CREATININE 0.45 03/15/2024     Physical Exam  Vitals reviewed.   Constitutional:       General: She is not in acute distress.     Appearance: She is ill-appearing.   Cardiovascular:      Rate and Rhythm: Normal rate.   Pulmonary:      Effort: Pulmonary effort is normal. No respiratory distress.   Abdominal:      General: There is no distension.      Palpations: Abdomen is soft.      Tenderness: There is abdominal tenderness.   Genitourinary:     Comments: Glover in place draining clear yellow urine  Neurological:      Mental Status: " She is alert.       Marianna Domingeuz MD  1/10/2025  7:28 AM

## 2025-01-10 NOTE — ASSESSMENT & PLAN NOTE
POD1. Admitted overnight for pain control; pain remains poorly controlled  - Tylenol, Motrin scheduled  - Roxicodone PRN  - Oxybutynin PRN

## 2025-01-11 VITALS
DIASTOLIC BLOOD PRESSURE: 57 MMHG | HEART RATE: 69 BPM | OXYGEN SATURATION: 98 % | BODY MASS INDEX: 20.13 KG/M2 | HEIGHT: 65 IN | WEIGHT: 120.81 LBS | TEMPERATURE: 98.4 F | SYSTOLIC BLOOD PRESSURE: 99 MMHG | RESPIRATION RATE: 18 BRPM

## 2025-01-11 PROCEDURE — NC001 PR NO CHARGE: Performed by: OBSTETRICS & GYNECOLOGY

## 2025-01-11 PROCEDURE — 99024 POSTOP FOLLOW-UP VISIT: CPT | Performed by: OBSTETRICS & GYNECOLOGY

## 2025-01-11 RX ORDER — OXYCODONE HYDROCHLORIDE 5 MG/1
5 TABLET ORAL EVERY 6 HOURS PRN
Qty: 10 TABLET | Refills: 0 | Status: SHIPPED | OUTPATIENT
Start: 2025-01-11 | End: 2025-01-21

## 2025-01-11 RX ORDER — OXYBUTYNIN CHLORIDE 5 MG/1
5 TABLET ORAL 3 TIMES DAILY PRN
Qty: 30 TABLET | Refills: 0 | Status: SHIPPED | OUTPATIENT
Start: 2025-01-11

## 2025-01-11 RX ORDER — ACETAMINOPHEN 325 MG/1
975 TABLET ORAL EVERY 8 HOURS SCHEDULED
Qty: 270 TABLET | Refills: 0 | Status: SHIPPED | OUTPATIENT
Start: 2025-01-11

## 2025-01-11 RX ADMIN — OXYBUTYNIN CHLORIDE 5 MG: 5 TABLET ORAL at 08:29

## 2025-01-11 RX ADMIN — ACETAMINOPHEN 975 MG: 325 TABLET, FILM COATED ORAL at 08:29

## 2025-01-11 RX ADMIN — IBUPROFEN 600 MG: 600 TABLET, FILM COATED ORAL at 08:29

## 2025-01-11 RX ADMIN — IBUPROFEN 600 MG: 600 TABLET, FILM COATED ORAL at 03:10

## 2025-01-11 RX ADMIN — HEPARIN SODIUM 5000 UNITS: 5000 INJECTION INTRAVENOUS; SUBCUTANEOUS at 05:21

## 2025-01-11 NOTE — PLAN OF CARE
Problem: PAIN - ADULT  Goal: Verbalizes/displays adequate comfort level or baseline comfort level  Description: Interventions:  - Encourage patient to monitor pain and request assistance  - Assess pain using appropriate pain scale  - Administer analgesics based on type and severity of pain and evaluate response  - Implement non-pharmacological measures as appropriate and evaluate response  - Consider cultural and social influences on pain and pain management  - Notify physician/advanced practitioner if interventions unsuccessful or patient reports new pain  Outcome: Progressing     Problem: INFECTION - ADULT  Goal: Absence or prevention of progression during hospitalization  Description: INTERVENTIONS:  - Assess and monitor for signs and symptoms of infection  - Monitor lab/diagnostic results  - Monitor all insertion sites, i.e. indwelling lines, tubes, and drains  - Monitor endotracheal if appropriate and nasal secretions for changes in amount and color  - [place] appropriate cooling/warming therapies per order  - Administer medications as ordered  - Instruct and encourage patient and family to use good hand hygiene technique  - Identify and instruct in appropriate isolation precautions for identified infection/condition  Outcome: Progressing  Goal: Absence of fever/infection during neutropenic period  Description: INTERVENTIONS:  - Monitor WBC    Outcome: Progressing     Problem: SAFETY ADULT  Goal: Patient will remain free of falls  Description: INTERVENTIONS:  - Educate patient/family on patient safety including physical limitations  - Instruct patient to call for assistance with activity   - Consult OT/PT to assist with strengthening/mobility   - Keep Call bell within reach  - Keep bed low and locked with side rails adjusted as appropriate  - Keep care items and personal belongings within reach  - Initiate and maintain comfort rounds  - Make Fall Risk Sign visible to staff  - Apply yellow socks and bracelet  for high fall risk patients  - Consider moving patient to room near nurses station  Outcome: Progressing  Goal: Maintain or return to baseline ADL function  Description: INTERVENTIONS:  -  Assess patient's ability to carry out ADLs; assess patient's baseline for ADL function and identify physical deficits which impact ability to perform ADLs (bathing, care of mouth/teeth, toileting, grooming, dressing, etc.)  - Assess/evaluate cause of self-care deficits   - Assess range of motion  - Assess patient's mobility; develop plan if impaired  - Assess patient's need for assistive devices and provide as appropriate  - Encourage maximum independence but intervene and supervise when necessary  - Involve family in performance of ADLs  - Assess for home care needs following discharge   - Consider OT consult to assist with ADL evaluation and planning for discharge  - Provide patient education as appropriate  Outcome: Progressing  Goal: Maintains/Returns to pre admission functional level  Description: INTERVENTIONS:  - Perform AM-PAC 6 Click Basic Mobility/ Daily Activity assessment daily.  - Set and communicate daily mobility goal to care team and patient/family/caregiver.   - Collaborate with rehabilitation services on mobility goals if consulted  - Perform Range of Motion 3 times a day.  - Reposition patient every 3 hours.  - Dangle patient 3 times a day  - Stand patient 3 times a day  - Ambulate patient 3 times a day  - Out of bed to chair 3 times a day   - Out of bed for meals 3 times a day  - Out of bed for toileting  - Record patient progress and toleration of activity level   Outcome: Progressing     Problem: DISCHARGE PLANNING  Goal: Discharge to home or other facility with appropriate resources  Description: INTERVENTIONS:  - Identify barriers to discharge w/patient and caregiver  - Arrange for needed discharge resources and transportation as appropriate  - Identify discharge learning needs (meds, wound care, etc.)  -  Arrange for interpretive services to assist at discharge as needed  - Refer to Case Management Department for coordinating discharge planning if the patient needs post-hospital services based on physician/advanced practitioner order or complex needs related to functional status, cognitive ability, or social support system  Outcome: Progressing     Problem: Knowledge Deficit  Goal: Patient/family/caregiver demonstrates understanding of disease process, treatment plan, medications, and discharge instructions  Description: Complete learning assessment and assess knowledge base.  Interventions:  - Provide teaching at level of understanding  - Provide teaching via preferred learning methods  Outcome: Progressing

## 2025-01-11 NOTE — DISCHARGE SUMMARY
Discharge Summary - GYN Oncology   Name: Beth Dorado 49 y.o. female I MRN: [id number]  Unit/Bed#: E5 -01 I Date of Admission: 1/9/2025   Date of Service: 1/11/2025 I Hospital Day: 1    Admission Date: 1/9/2025  Discharge Date:   Admitting Diagnosis: Cyst of right ovary [N83.201]  Menorrhagia with regular cycle [N92.0]    HPI: 49 y.o. female who presented for definitive surgery for AUB and ovarian cyst.     Procedures Performed:   1/9/25: robotic-assisted type II radical hysterectomy, bilateral salpingo-oophorectomy, placement of right ureteral stent, cystoscopy     Hospital Course: Patient was admitted following the above listed procedure for post operative pain control. She had a das left in place which would be removed after discharge as an outpatient. Her pain regimen was adjusted and well controlled by POD#2 at which point she was also tolerating PO, passing flatus and ambulating without issue.      Significant Findings, Care, Treatment and Services Provided: See above    Lab Results: CBC:   Lab Results   Component Value Date    WBC 11.20 (H) 01/10/2025    HGB 11.6 01/10/2025    HCT 35.1 01/10/2025    MCV 92 01/10/2025     01/10/2025    RBC 3.80 (L) 01/10/2025    MCH 30.5 01/10/2025    MCHC 33.0 01/10/2025    RDW 13.3 01/10/2025    MPV 10.5 01/10/2025   , CMP:   Lab Results   Component Value Date    SODIUM 140 01/10/2025    K 3.6 01/10/2025     01/10/2025    CO2 27 01/10/2025    BUN 13 01/10/2025    CREATININE 0.61 01/10/2025    CALCIUM 8.3 (L) 01/10/2025    EGFR 105 01/10/2025     Complications: none    Discharge Diagnosis: same as above    Medical Problems       Resolved Problems  Date Reviewed: 1/9/2025   None         Condition at Discharge: good       Discharge instructions/Information to patient and family:   See after visit summary for information provided to patient and family.      Provisions for Follow-Up Care:  See after visit summary for information related to follow-up care and  any pertinent home health orders.      Disposition: Home      Planned Readmission: No      Discharge Medications:  See after visit summary for reconciled discharge medications provided to patient and family.

## 2025-01-11 NOTE — PLAN OF CARE
Problem: PAIN - ADULT  Goal: Verbalizes/displays adequate comfort level or baseline comfort level  Description: Interventions:  - Encourage patient to monitor pain and request assistance  - Assess pain using appropriate pain scale  - Administer analgesics based on type and severity of pain and evaluate response  - Implement non-pharmacological measures as appropriate and evaluate response  - Consider cultural and social influences on pain and pain management  - Notify physician/advanced practitioner if interventions unsuccessful or patient reports new pain  1/11/2025 1116 by Rock Simpson RN  Outcome: Adequate for Discharge  1/11/2025 0727 by Rock Simpson RN  Outcome: Progressing     Problem: INFECTION - ADULT  Goal: Absence or prevention of progression during hospitalization  Description: INTERVENTIONS:  - Assess and monitor for signs and symptoms of infection  - Monitor lab/diagnostic results  - Monitor all insertion sites, i.e. indwelling lines, tubes, and drains  - Monitor endotracheal if appropriate and nasal secretions for changes in amount and color  - [place] appropriate cooling/warming therapies per order  - Administer medications as ordered  - Instruct and encourage patient and family to use good hand hygiene technique  - Identify and instruct in appropriate isolation precautions for identified infection/condition  1/11/2025 1116 by Rock Simpson RN  Outcome: Adequate for Discharge  1/11/2025 0727 by Rock Simpson RN  Outcome: Progressing  Goal: Absence of fever/infection during neutropenic period  Description: INTERVENTIONS:  - Monitor WBC    1/11/2025 1116 by Rock Simpson RN  Outcome: Adequate for Discharge  1/11/2025 0727 by Rock Simpson RN  Outcome: Progressing     Problem: SAFETY ADULT  Goal: Patient will remain free of falls  Description: INTERVENTIONS:  - Educate patient/family on patient safety including physical limitations  - Instruct patient to call for assistance  with activity   - Consult OT/PT to assist with strengthening/mobility   - Keep Call bell within reach  - Keep bed low and locked with side rails adjusted as appropriate  - Keep care items and personal belongings within reach  - Initiate and maintain comfort rounds  - Make Fall Risk Sign visible to staff  - Apply yellow socks and bracelet for high fall risk patients  - Consider moving patient to room near nurses station  1/11/2025 1116 by Rock Simpson RN  Outcome: Adequate for Discharge  1/11/2025 0727 by Rock Simpson RN  Outcome: Progressing  Goal: Maintain or return to baseline ADL function  Description: INTERVENTIONS:  -  Assess patient's ability to carry out ADLs; assess patient's baseline for ADL function and identify physical deficits which impact ability to perform ADLs (bathing, care of mouth/teeth, toileting, grooming, dressing, etc.)  - Assess/evaluate cause of self-care deficits   - Assess range of motion  - Assess patient's mobility; develop plan if impaired  - Assess patient's need for assistive devices and provide as appropriate  - Encourage maximum independence but intervene and supervise when necessary  - Involve family in performance of ADLs  - Assess for home care needs following discharge   - Consider OT consult to assist with ADL evaluation and planning for discharge  - Provide patient education as appropriate  1/11/2025 1116 by Rock Simpson RN  Outcome: Adequate for Discharge  1/11/2025 0727 by Rock Simpson RN  Outcome: Progressing  Goal: Maintains/Returns to pre admission functional level  Description: INTERVENTIONS:  - Perform AM-PAC 6 Click Basic Mobility/ Daily Activity assessment daily.  - Set and communicate daily mobility goal to care team and patient/family/caregiver.   - Collaborate with rehabilitation services on mobility goals if consulted  - Perform Range of Motion 3 times a day.  - Reposition patient every 3 hours.  - Dangle patient 3 times a day  - Stand patient  3 times a day  - Ambulate patient 3 times a day  - Out of bed to chair 3 times a day   - Out of bed for meals 3 times a day  - Out of bed for toileting  - Record patient progress and toleration of activity level   1/11/2025 1116 by Rock Simpson RN  Outcome: Adequate for Discharge  1/11/2025 0727 by Rock Simpson RN  Outcome: Progressing     Problem: DISCHARGE PLANNING  Goal: Discharge to home or other facility with appropriate resources  Description: INTERVENTIONS:  - Identify barriers to discharge w/patient and caregiver  - Arrange for needed discharge resources and transportation as appropriate  - Identify discharge learning needs (meds, wound care, etc.)  - Arrange for interpretive services to assist at discharge as needed  - Refer to Case Management Department for coordinating discharge planning if the patient needs post-hospital services based on physician/advanced practitioner order or complex needs related to functional status, cognitive ability, or social support system  1/11/2025 1116 by Rock Simpson RN  Outcome: Adequate for Discharge  1/11/2025 0727 by Rock Simpson RN  Outcome: Progressing     Problem: Knowledge Deficit  Goal: Patient/family/caregiver demonstrates understanding of disease process, treatment plan, medications, and discharge instructions  Description: Complete learning assessment and assess knowledge base.  Interventions:  - Provide teaching at level of understanding  - Provide teaching via preferred learning methods  1/11/2025 1116 by Rock Simpson RN  Outcome: Adequate for Discharge  1/11/2025 0727 by Rock Simpson RN  Outcome: Progressing

## 2025-01-11 NOTE — PROGRESS NOTES
"Gyn Oncology Progress note   Beth Dorado 49 y.o. female MRN: [id number]  Unit/Bed#: E5 -01 Encounter: [id number]    Assessment/Plan:  49 y.o. POD# 2 from robotic-assisted type II radical hysterectomy, bilateral salpingo-oophorectomy, placement of right ureteral stent, cystoscopy.  Meeting all postop milestones, plan for discharge home today.    * S/P hysterectomy with oophorectomy  Assessment & Plan  Follow-up final surgical pathology  Pain controlled, will discharge home with scheduled Tylenol/Motrin and as needed Roxicodone  Oxybutynin PRN for bladder spasms    Outpatient cystogram ordered for 1/20/2025 with plan for Glover removal if normal, has outpatient follow-up scheduled with Dr. Poon and will plan to remove ureteral stent in 4 to 6 weeks       Subjective:  Beth Dorado reports her pain has improved significantly. She is currently passing flatus. Glover is in place with adequate output. She is tolerating PO, and denies nausea or vomiting.     Objective:  BP 99/57 (BP Location: Right arm)   Pulse 69   Temp 98.4 °F (36.9 °C) (Oral)   Resp 18   Ht 5' 5\" (1.651 m)   Wt 54.8 kg (120 lb 13 oz)   LMP 12/12/2024 (Exact Date)   SpO2 98%   BMI 20.10 kg/m²     I/O last 3 completed shifts:  In: 480 [P.O.:480]  Out: 4750 [Urine:4750]  No intake/output data recorded.    Lab Results   Component Value Date    WBC 11.20 (H) 01/10/2025    HGB 11.6 01/10/2025    HCT 35.1 01/10/2025    MCV 92 01/10/2025     01/10/2025     Lab Results   Component Value Date    CALCIUM 8.3 (L) 01/10/2025    K 3.6 01/10/2025    CO2 27 01/10/2025     01/10/2025    BUN 13 01/10/2025    CREATININE 0.61 01/10/2025     Physical Exam  Vitals reviewed.   Constitutional:       General: She is not in acute distress.     Appearance: She is normal weight.   HENT:      Mouth/Throat:      Mouth: Mucous membranes are moist.      Pharynx: Oropharynx is clear.   Cardiovascular:      Rate and Rhythm: Normal rate and regular rhythm. "   Pulmonary:      Effort: Pulmonary effort is normal. No respiratory distress.   Abdominal:      General: Abdomen is flat. There is no distension.      Palpations: Abdomen is soft.      Tenderness: There is abdominal tenderness (appropriate).   Genitourinary:     Comments: Glover in place draining clear yellow urine  Skin:     General: Skin is warm and dry.   Neurological:      General: No focal deficit present.      Mental Status: She is alert and oriented to person, place, and time.       Marcy Hyatt MD  1/11/2025  9:14 AM

## 2025-01-13 NOTE — UTILIZATION REVIEW
Initial Clinical Review    Elective outpatient procedure, converted to inpatient admission due to    Post op pain.      Elective   Op    surgical procedure    Age/Sex: 49 y.o. female    Surgery Date:   1/9/25    Procedure: EXAM UNDER ANESTHESIA.  ROBOTIC TYPE II RADICAL HYSTERECTOMY. BILATERAL SALPINGO-OOPHORECTOMY  CYSTOSCOPY WITH RIGHT DOUBLE J (6 Fr X 24 CM) URETERAL STENT INSERTION AND 5 FR OPEN ENDED URETERAL CATHETER INSERTION (REMOVED AT THE END OF THE PROCEDURE)    Anesthesia:  general    Operative Findings: #1.  Frozen pelvis with approximately 14 weeks size uterus, 5 to 6 cm right ovarian mass inseparable from right pelvic sidewall/ureteral sheath and right uterosacral ligament as well as approximately 4 cm slightly enlarged left ovary with surface hemorrhagic appearance inseparable from left uterosacral ligament and posterior aspect of the uterus.  Grossly findings suggestive of stage IV endometriosis.  Densely adherent bladder was inseparable from anterior aspect of the uterus.  Unintentional approximately 2 to 3 cm dome cystotomy was made which allowed complete mobilization of the bladder off of the anterior aspect of the uterus.  Cystotomy was closed in 2 layers and the bladder subsequently distended with approximately 200-250 mm of sterile water with documentation of watertight repair.  We plan to maintain indwelling Glover catheter for approximately 7 to 10 days and obtain cystoscopy prior to removal.  We anticipate removing ureteral stent in the outpatient setting in approximately 4 to 6 weeks.  #2.  Given extensive proximal right ureteral dilatation as well as right retroperitoneal fibrosis and need for very extensive dissection along the course of the ureter, 6 Burundian 24 cm right ureteral stent was inserted.  In order to better visualize the course of the left ureter, a 5 Burundian open-ended ureteral catheter was inserted but removed at the completion of the procedure.  #3.  Frozen section of right  ovary demonstrated no evidence of malignancy.  Gross evaluation of the endometrium demonstrated benign appearing endometrial polyp.  #4.  Cystoscopy demonstrated normal bladder mucosa, bilateral potent ureteral urine jets, easy bilateral retrograde ureteral catheterization.    POD#1 Progress Note:   1/10     IP ADMISSION  Continue  pain  control as needed.  Still with significant post op pain.   Tolerating po.   Glover  catheter intact, good urine output.  Ill appearing.      Admission Orders: Date/Time/Statement:   Admission Orders (From admission, onward)       Ordered        01/10/25 1714  INPATIENT ADMISSION  Once                          Orders Placed This Encounter   Procedures    INPATIENT ADMISSION     Standing Status:   Standing     Number of Occurrences:   1     Level of Care:   Med Surg [16]     Estimated length of stay:   More than 2 Midnights     Certification:   I certify that inpatient services are medically necessary for this patient for a duration of greater than two midnights. See H&P and MD Progress Notes for additional information about the patient's course of treatment.     Diet:  regular    Mobility:  OOB as tolerated    DVT Prophylaxis:  SCD's    Medications/Pain Control:   IV  tylenol  Q 8 hrs  IV  toradol Q 6 hrs    Scheduled Medications:  Sq heparin  Q 8 hrs  Motrin  Q 6 hrs    Continuous IV Infusions:  IVF  125/hr      PRN Meds:  IV  toradol  ( x 1  1/10)     Vital Signs (last 3 days) before discharge       Date/Time Temp Pulse Resp BP MAP (mmHg) SpO2 O2 Device Patient Position - Orthostatic VS Pain    01/11/25 1017 -- -- -- -- -- -- -- -- No Pain    01/11/25 0829 -- -- -- -- -- -- -- -- 5    01/11/25 07:48:18 98.4 °F (36.9 °C) 69 18 99/57 71 98 % None (Room air) Lying --    01/11/25 0310 -- -- -- -- -- -- -- -- No Pain    01/11/25 02:48:20 98.1 °F (36.7 °C) 70 14 94/56 69 97 % None (Room air) Lying --    01/10/25 2324 -- -- -- -- -- -- -- -- 7    01/10/25 2317 -- -- -- -- -- -- -- -- 7     01/10/25 2134 -- -- -- -- -- -- -- -- 5    01/10/25 18:34:53 97.9 °F (36.6 °C) -- 14 97/56 70 -- None (Room air) Lying --    01/10/25 1832 -- -- -- -- -- -- -- -- 6    01/10/25 11:10:22 98.1 °F (36.7 °C) 75 18 101/61 74 96 % None (Room air) Sitting --    01/10/25 1047 -- -- -- -- -- -- -- -- 7    01/10/25 07:33:14 98.9 °F (37.2 °C) 68 -- 105/64 78 98 % -- -- No Pain    01/10/25 0649 -- -- -- -- -- -- -- -- 9    01/10/25 03:08:33 98.8 °F (37.1 °C) 81 16 127/68 88 99 % None (Room air) Lying --    01/10/25 0034 -- -- -- -- -- -- -- -- 6    01/09/25 23:13:40 99.1 °F (37.3 °C) 87 16 102/72 82 96 % None (Room air) Lying --    01/09/25 2226 -- -- -- -- -- -- None (Room air) -- --    01/09/25 2222 -- -- -- -- -- -- -- -- 7 01/09/25 20:54:04 99.2 °F (37.3 °C) 89 -- 125/83 97 98 % -- -- --    01/09/25 2022 -- -- -- -- -- -- -- -- 9    01/09/25 1828 98.6 °F (37 °C) 88 14 124/66 -- 98 % None (Room air) -- --    01/09/25 1810 -- -- -- -- -- -- -- -- 8    01/09/25 1800 -- -- -- -- -- -- -- -- --    OBSERV: RN demonstrated das emptying, care, and switching between bags with practice das equipment. Handout reviewed with pt and S.O. Pt and S.O. verbalized understanding. Pt requesting to go home in standard drainage bag. at 01/09/25 1800    01/09/25 1649 99.2 °F (37.3 °C) 89 14 128/60 -- 97 % None (Room air) -- 8 01/09/25 1642 -- -- -- -- -- -- -- -- 8 01/09/25 1619 98.5 °F (36.9 °C) 92 18 133/71 -- 98 % None (Room air) -- 7 01/09/25 1607 99.4 °F (37.4 °C) 88 18 127/63 89 98 % None (Room air) -- --    01/09/25 1552 -- 80 18 120/61 84 96 % None (Room air) -- 4 01/09/25 1544 -- 88 18 128/62 88 98 % -- -- 6    01/09/25 1533 -- 92 -- -- -- 98 % None (Room air) -- 7 01/09/25 1528 -- 90 18 -- -- 98 % None (Room air) -- 4 01/09/25 1523 -- 90 18 131/63 90 99 % None (Room air) -- 8 01/09/25 1500 -- 94 18 135/76 98 99 % None (Room air) -- --    01/09/25 1453 -- 102 16 -- -- 100 % None (Room air) -- --    01/09/25  1451 99.2 °F (37.3 °C) 102 16 135/76 98 100 % None (Room air) -- --    01/09/25 1110 -- -- -- -- -- -- -- -- No Pain    01/09/25 0957 -- -- -- -- -- -- -- -- No Pain    01/09/25 0955 98.2 °F (36.8 °C) 83 16 117/63 -- 98 % None (Room air) -- --          Weight (last 2 days) before discharge       Date/Time Weight    01/09/25 1800 --    Comment rows:    OBSERV: RN demonstrated das emptying, care, and switching between bags with practice das equipment. Handout reviewed with pt and S.O. Pt and S.O. verbalized understanding. Pt requesting to go home in standard drainage bag. at 01/09/25 1800    01/09/25 0957 54.8 (120.81)            Pertinent Labs/Diagnostic Test Results:   Radiology:  XR abdomen 1 view kub   Final Interpretation by Bharathi Miller MD (01/12 0429)      Subcutaneous emphysema overlying the abdomen and right hip likely related to recent surgery.      Right ureteral stent is present.            Workstation performed: [id number]           Cardiology:      Results from last 7 days   Lab Units 01/10/25  1351   WBC Thousand/uL 11.20*   HEMOGLOBIN g/dL 11.6   HEMATOCRIT % 35.1   PLATELETS Thousands/uL 167         Results from last 7 days   Lab Units 01/10/25  1351   SODIUM mmol/L 140   POTASSIUM mmol/L 3.6   CHLORIDE mmol/L 107   CO2 mmol/L 27   ANION GAP mmol/L 6   BUN mg/dL 13   CREATININE mg/dL 0.61   EGFR ml/min/1.73sq m 105   CALCIUM mg/dL 8.3*             Results from last 7 days   Lab Units 01/10/25  1351   GLUCOSE RANDOM mg/dL 109                                     Network Utilization Review Department  ATTENTION: Please call with any questions or concerns to [phone] and carefully listen to the prompts so that you are directed to the right person. All voicemails are confidential.   For Discharge needs, contact Care Management DC Support Team at [phone] opt. 2  Send all requests for admission clinical reviews, approved or denied determinations and any other requests to dedicated fax number  below belonging to the campus where the patient is receiving treatment. List of dedicated fax numbers for the Facilities:  FACILITY NAME UR FAX NUMBER   ADMISSION DENIALS (Administrative/Medical Necessity) [phone]   DISCHARGE SUPPORT TEAM (NETWORK) [phone]   PARENT CHILD HEALTH (Maternity/NICU/Pediatrics) [phone]   [facility] [phone]   [facility] [phone]   [facility] [phone]   [facility] [phone]   [facility] [phone]   [facility] [phone]   [facility] [phone]   [facility] [phone]   [facility] [phone]   [facility] [phone]   [facility] [phone]   [facility] [phone]

## 2025-01-13 NOTE — UTILIZATION REVIEW
NOTIFICATION OF INPATIENT ADMISSION   AUTHORIZATION REQUEST   SERVICING FACILITY:   [place] [address]  Tax ID: [id number]  NPI: [id number] ATTENDING PROVIDER:  Attending Name and NPI#: Edin Poon Md [[id number]]  Address: [address] [place] [address]  Phone: [phone]     ADMISSION INFORMATION:  Place of Service: Inpatient [facility]  Place of Service Code: 21  Inpatient Admission Date/Time: 1/10/25  5:14 PM  Discharge Date/Time: 1/11/2025  1:28 PM  Admitting Diagnosis Code/Description:  Cyst of right ovary [N83.201]  Menorrhagia with regular cycle [N92.0]     UTILIZATION REVIEW CONTACT:  Priscilla Milligan Utilization   Network Utilization Review Department  Phone: [phone]  Fax [phone]  Email: [email or web address]  Contact for approvals/pending authorizations, clinical reviews, and discharge.     PHYSICIAN ADVISORY SERVICES:  Medical Necessity Denial & Zxkc-nv-Pphv Review  Phone: [phone]  Fax: [phone]  Email: [email or web address]     DISCHARGE SUPPORT TEAM:  For Patients Discharge Needs & Updates  Phone: [phone] opt. 2 Fax: [phone]  Email: [email or web address]

## 2025-01-13 NOTE — UTILIZATION REVIEW
NOTIFICATION OF ADMISSION DISCHARGE   This is a Notification of Discharge from [facility]. Please be advised that this patient has been discharge from our facility. Below you will find the admission and discharge date and time including the patient’s disposition.   UTILIZATION REVIEW CONTACT:  Ashlee Yousif  Utilization   Network Utilization Review Department  Phone: [phone] x carefully listen to the prompts. All voicemails are confidential.  Email: [email or web address]     ADMISSION INFORMATION  PRESENTATION DATE: 1/9/2025  9:39 AM  OBERVATION ADMISSION DATE: N/A  INPATIENT ADMISSION DATE: 1/10/25  5:14 PM   DISCHARGE DATE: 1/11/2025  1:28 PM   DISPOSITION:Home/Self Care    Network Utilization Review Department  ATTENTION: Please call with any questions or concerns to [phone] and carefully listen to the prompts so that you are directed to the right person. All voicemails are confidential.   For Discharge needs, contact Care Management DC Support Team at [phone] opt. 2  Send all requests for admission clinical reviews, approved or denied determinations and any other requests to dedicated fax number below belonging to the campus where the patient is receiving treatment. List of dedicated fax numbers for the Facilities:  FACILITY NAME UR FAX NUMBER   ADMISSION DENIALS (Administrative/Medical Necessity) [phone]   DISCHARGE SUPPORT TEAM ([facility]) [phone]   PARENT CHILD HEALTH (Maternity/NICU/Pediatrics) [phone]   [facility] [phone]   [facility] [phone]   Pending sale to [facility] [phone]   [facility] [phone]   [facility] [phone]   [facility] [phone]   [facility] [phone]   [facility] [phone]    [facility] [phone]   [facility] [phone]   [facility] [phone]   [facility] [phone]

## 2025-01-14 PROCEDURE — 88305 TISSUE EXAM BY PATHOLOGIST: CPT | Performed by: STUDENT IN AN ORGANIZED HEALTH CARE EDUCATION/TRAINING PROGRAM

## 2025-01-14 PROCEDURE — 88307 TISSUE EXAM BY PATHOLOGIST: CPT | Performed by: STUDENT IN AN ORGANIZED HEALTH CARE EDUCATION/TRAINING PROGRAM

## 2025-01-20 ENCOUNTER — HOSPITAL ENCOUNTER (OUTPATIENT)
Dept: RADIOLOGY | Facility: HOSPITAL | Age: 50
Discharge: HOME | End: 2025-01-20
Attending: OBSTETRICS & GYNECOLOGY
Payer: COMMERCIAL

## 2025-01-20 DIAGNOSIS — Z98.890 S/P BLADDER REPAIR: ICD-10-CM

## 2025-01-20 PROCEDURE — 74430 CONTRAST X-RAY BLADDER: CPT

## 2025-01-20 RX ADMIN — IOTHALAMATE MEGLUMINE 300 ML: 172 INJECTION URETERAL at 10:40

## 2025-02-04 ENCOUNTER — TELEPHONE (OUTPATIENT)
Age: 50
End: 2025-02-04

## 2025-02-04 ENCOUNTER — OFFICE VISIT (OUTPATIENT)
Dept: GYNECOLOGIC ONCOLOGY | Facility: CLINIC | Age: 50
End: 2025-02-04
Payer: COMMERCIAL

## 2025-02-04 VITALS
SYSTOLIC BLOOD PRESSURE: 138 MMHG | WEIGHT: 117 LBS | OXYGEN SATURATION: 100 % | DIASTOLIC BLOOD PRESSURE: 82 MMHG | BODY MASS INDEX: 19.47 KG/M2 | HEART RATE: 68 BPM | TEMPERATURE: 97.7 F

## 2025-02-04 DIAGNOSIS — Z90.721 S/P HYSTERECTOMY WITH OOPHORECTOMY: Chronic | ICD-10-CM

## 2025-02-04 DIAGNOSIS — Z90.710 S/P HYSTERECTOMY WITH OOPHORECTOMY: Chronic | ICD-10-CM

## 2025-02-04 DIAGNOSIS — B96.89 BACTERIAL VAGINOSIS: Primary | ICD-10-CM

## 2025-02-04 DIAGNOSIS — N76.0 BACTERIAL VAGINOSIS: Primary | ICD-10-CM

## 2025-02-04 PROBLEM — N92.0 MENORRHAGIA WITH REGULAR CYCLE: Status: RESOLVED | Noted: 2024-05-16 | Resolved: 2025-02-04

## 2025-02-04 PROBLEM — N83.209 OVARIAN CYST: Status: RESOLVED | Noted: 2024-05-16 | Resolved: 2025-02-04

## 2025-02-04 PROBLEM — Z98.890 S/P BLADDER REPAIR: Status: RESOLVED | Noted: 2025-01-09 | Resolved: 2025-02-04

## 2025-02-04 PROBLEM — R53.82 CHRONIC FATIGUE: Status: RESOLVED | Noted: 2025-01-09 | Resolved: 2025-02-04

## 2025-02-04 PROCEDURE — 99214 OFFICE O/P EST MOD 30 MIN: CPT | Performed by: OBSTETRICS & GYNECOLOGY

## 2025-02-04 RX ORDER — METRONIDAZOLE 500 MG/1
500 TABLET ORAL EVERY 12 HOURS SCHEDULED
Qty: 14 TABLET | Refills: 0 | Status: SHIPPED | OUTPATIENT
Start: 2025-02-04 | End: 2025-02-10 | Stop reason: ALTCHOICE

## 2025-02-04 NOTE — ASSESSMENT & PLAN NOTE
Independently obtained history from patient today.  Final pathology reviewed and consistent with endometriosis.  She is recovering well considering complexity of her surgery..     Reports some grayish foul-smelling vaginal discharge.  Discomfort associated with temp is still present.  Will refer to urology for stent removal ASAP.    I emphasized importance of strict pelvic rest and no heavy lifting.  Will reevaluate/repeat cuff check in approximately 2 to 3 weeks.  Orders:  •  Ambulatory referral to Urology; Future

## 2025-02-04 NOTE — TELEPHONE ENCOUNTER
ASAP Npt Referral please review records/results for scheduling protocol I was unsure if time sensitive, Providers Comments:Patient needs office cystoscopy and stent removal ASAP. The stent was placed approximately 2 to 3 weeks ago after extensive dissection from pelvic surgery. Patient uncomfortable with stent. Please go ahead and remove. Contact patient ASAP to schedule. Contact Dr. Poon if you have questions or concerns.

## 2025-02-04 NOTE — PROGRESS NOTES
Name: Beth Dorado      : 1975      MRN: [id number]  Encounter Provider: Edin Poon MD  Encounter Date: 2025   Encounter department: [facility] [place]  :  Assessment & Plan  Bacterial vaginosis  Frothy grayish and foul-smelling vaginal discharge noted.  Will prescribe metronidazole 500 mg p.o. twice daily to treat empirically  Orders:  •  metroNIDAZOLE (FLAGYL) 500 mg tablet; Take 1 tablet (500 mg total) by mouth every 12 (twelve) hours for 7 days    S/P hysterectomy with oophorectomy  Independently obtained history from patient today.  Final pathology reviewed and consistent with endometriosis.  She is recovering well considering complexity of her surgery..     Reports some grayish foul-smelling vaginal discharge.  Discomfort associated with temp is still present.  Will refer to urology for stent removal ASAP.    I emphasized importance of strict pelvic rest and no heavy lifting.  Will reevaluate/repeat cuff check in approximately 2 to 3 weeks.  Orders:  •  Ambulatory referral to Urology; Future            History of Present Illness   Reason for Visit / CC: Postoperative follow-up   Beth Dorado is a 49 y.o. female   Patient is approximately 4 weeks out after robotic hysterectomy, bilateral salpingo-oophorectomy, extensive lysis of adhesions, indwelling ureteral stent placement, cystotomy repair for advanced/severe endometriosis.  She has recovered well.  Cystogram on postop week to demonstrate complete healing and no leak.  Glover catheter removed.  Complains of occasional stent related spasms.  Complains of foul-smelling vaginal discharge.  Some constipation which she manages with over-the-counter medications.  Denies vaginal bleeding.  Denies pelvic or abdominal pain.      Pertinent Medical History   As per problem list.  Noncontributory.      Review of Systems A complete review of systems is negative other than that noted above in the HPI.       Objective   BP  138/82 (BP Location: Left arm, Patient Position: Sitting, Cuff Size: Standard)   Pulse 68   Temp 97.7 °F (36.5 °C) (Temporal)   Wt 53.1 kg (117 lb)   LMP  (LMP Unknown)   SpO2 100%   BMI 19.47 kg/m²     Body mass index is 19.47 kg/m².  Pain Screening:  Pain Score:   5 (incisions)  ECOG     Physical Exam   Abdomen: Incisions well-healed.  No hernias.  Pelvic: Normal external genitalia.  Vulva and vagina with moderate amount of foul-smelling grayish discharge suggestive of bacterial vaginosis.  The left vaginal angle seems somewhat  with well organized reperitonealized surfaces.  I emphasized importance of strict pelvic rest.  Will institute treatment for bacterial vaginosis and reevaluate in 2 to 3 weeks.    Edin Poon MD, JOSIANE, FACOG, FACS  2/4/2025  8:54 AM

## 2025-02-04 NOTE — TELEPHONE ENCOUNTER
Contacted patient and scheduled her for a NP/Cysto stent removal appointment with Dr. Arteaga on 2/7/25 @ 8:30 am in the [facility]. Office address provided and patient was advised to arrive 15 minutes early.

## 2025-02-04 NOTE — TELEPHONE ENCOUNTER
New Patient    Appointment Scheduling  What office location does the patient prefer?:  What is the reason for the patient's appointment?:Stent Removal/Cystoscopy  Have patient records been requested?:no  If No, are the records showing in Epic: yes    Appointment Details  Date:  Time:      Location:     Provider:    Does the appointment need further review? (Reason)      HISTORY  Is the patient having active symptoms? If so, describe symptoms:  Has the patient had any previous Urologist(s)?:no  Was the patient seen in the ED?:no  Has the patient had any outside testing done?:no  Does patient have Imaging/Lab Results:yes  Does the patient have a personal history of any cancer?:no    INSURANCE   Have you confirmed Patient's insurance? yes  Is the insurance accepted?  yes  Is the insurance active? yes

## 2025-02-04 NOTE — ASSESSMENT & PLAN NOTE
Frothy grayish and foul-smelling vaginal discharge noted.  Will prescribe metronidazole 500 mg p.o. twice daily to treat empirically  Orders:  •  metroNIDAZOLE (FLAGYL) 500 mg tablet; Take 1 tablet (500 mg total) by mouth every 12 (twelve) hours for 7 days

## 2025-02-07 ENCOUNTER — PATIENT MESSAGE (OUTPATIENT)
Dept: UROLOGY | Facility: CLINIC | Age: 50
End: 2025-02-07

## 2025-02-07 ENCOUNTER — OFFICE VISIT (OUTPATIENT)
Dept: UROLOGY | Facility: CLINIC | Age: 50
End: 2025-02-07
Payer: COMMERCIAL

## 2025-02-07 VITALS
DIASTOLIC BLOOD PRESSURE: 80 MMHG | WEIGHT: 119 LBS | HEART RATE: 82 BPM | SYSTOLIC BLOOD PRESSURE: 122 MMHG | BODY MASS INDEX: 19.8 KG/M2 | OXYGEN SATURATION: 100 %

## 2025-02-07 DIAGNOSIS — Z90.710 S/P HYSTERECTOMY WITH OOPHORECTOMY: Chronic | ICD-10-CM

## 2025-02-07 DIAGNOSIS — Z90.710 S/P HYSTERECTOMY WITH OOPHORECTOMY: Primary | Chronic | ICD-10-CM

## 2025-02-07 DIAGNOSIS — Z90.721 S/P HYSTERECTOMY WITH OOPHORECTOMY: Primary | Chronic | ICD-10-CM

## 2025-02-07 DIAGNOSIS — Z90.721 S/P HYSTERECTOMY WITH OOPHORECTOMY: Chronic | ICD-10-CM

## 2025-02-07 PROCEDURE — 52310 CYSTOSCOPY AND TREATMENT: CPT | Performed by: UROLOGY

## 2025-02-07 RX ORDER — LEVOFLOXACIN 250 MG/1
250 TABLET, FILM COATED ORAL DAILY
Qty: 1 TABLET | Refills: 0 | Status: SHIPPED | OUTPATIENT
Start: 2025-02-07 | End: 2025-02-07 | Stop reason: SDUPTHER

## 2025-02-07 RX ORDER — LEVOFLOXACIN 250 MG/1
250 TABLET, FILM COATED ORAL DAILY
Qty: 1 TABLET | Refills: 0 | Status: SHIPPED | OUTPATIENT
Start: 2025-02-07 | End: 2025-02-08

## 2025-02-07 NOTE — PROGRESS NOTES
Beth is status post robotic hysterectomy oophorectomy for severe chronic pain and bleeding, apparently due to endometriosis.  Intraoperatively there was concern for involvement of right ureter.  There was a cystotomy and repair done as well.  She has since had a cystogram and Glover catheter removed.  She has indwelling right ureteral stent in place, and presents today for removal.         Cystoscopy     Date/Time  2/7/2025 8:30 AM     Performed by  Bob Arteaga MD   Authorized by  Bob Arteaga MD         Procedure Details:  Procedure type: simple removal of a foreign body, stone, or stent    Additional Procedure Details: Patient was prepped with chlorhexidine and lidocaine jelly.  The flexible cystoscope was placed.  Her ureteral stent was identified emanating from the right ureteral orifice.  The stent was grasped with a grasper and retrieved intact without difficulty.  Patient tolerated this well.    Plan  Empiric single dose of Levaquin ordered for her.  I will check an ultrasound of the urinary tract in the next 1 to 2 weeks to ensure no hydronephrosis.  As long as this is normal, she will not need urologic follow-up.

## 2025-02-07 NOTE — Clinical Note
Looks good. I ordered an u/s just to be sure everything looks good. No need to follow up if it is normal.

## 2025-02-10 ENCOUNTER — TELEPHONE (OUTPATIENT)
Age: 50
End: 2025-02-10

## 2025-02-10 DIAGNOSIS — N76.0 BACTERIAL VAGINOSIS: Primary | ICD-10-CM

## 2025-02-10 DIAGNOSIS — B96.89 BACTERIAL VAGINOSIS: Primary | ICD-10-CM

## 2025-02-10 RX ORDER — METRONIDAZOLE 7.5 MG/G
1 GEL VAGINAL
Qty: 5 G | Refills: 0 | Status: SHIPPED | OUTPATIENT
Start: 2025-02-10 | End: 2025-02-15

## 2025-02-10 NOTE — TELEPHONE ENCOUNTER
Return call placed. Patient attempted oral metronidazole with food, and denies consuming alcohol. She notes the vaginal odor resolved and then returned within 24hrs after her last dose. Will plan for metrogel x 5d.

## 2025-02-10 NOTE — TELEPHONE ENCOUNTER
FYI:  Pt calling saw Dr HOBBS 2/4 Dx: BV was placed on 7 days course Flagyl BID.     Pt was only able to tolerate 8 pills(4 days) due to GI upset.  Pt stopped medication and now still with the vaginal odor mainly present.    Pt has no other issues to report at this time.  F/u appt sched for 2/25.    Pls advise

## 2025-02-14 ENCOUNTER — APPOINTMENT (EMERGENCY)
Dept: CT IMAGING | Facility: HOSPITAL | Age: 50
End: 2025-02-14
Payer: COMMERCIAL

## 2025-02-14 ENCOUNTER — HOSPITAL ENCOUNTER (EMERGENCY)
Facility: HOSPITAL | Age: 50
Discharge: HOME | End: 2025-02-15
Attending: EMERGENCY MEDICINE
Payer: COMMERCIAL

## 2025-02-14 ENCOUNTER — HOSPITAL ENCOUNTER (OUTPATIENT)
Dept: ULTRASOUND IMAGING | Facility: HOSPITAL | Age: 50
Discharge: HOME | End: 2025-02-14
Attending: UROLOGY
Payer: COMMERCIAL

## 2025-02-14 VITALS
OXYGEN SATURATION: 100 % | BODY MASS INDEX: 19.92 KG/M2 | SYSTOLIC BLOOD PRESSURE: 140 MMHG | TEMPERATURE: 99 F | HEART RATE: 102 BPM | RESPIRATION RATE: 18 BRPM | DIASTOLIC BLOOD PRESSURE: 96 MMHG | WEIGHT: 119.71 LBS

## 2025-02-14 DIAGNOSIS — N13.30 HYDROURETERONEPHROSIS: ICD-10-CM

## 2025-02-14 DIAGNOSIS — Z90.710 S/P HYSTERECTOMY WITH OOPHORECTOMY: Chronic | ICD-10-CM

## 2025-02-14 DIAGNOSIS — R11.0 NAUSEA: ICD-10-CM

## 2025-02-14 DIAGNOSIS — Z90.721 S/P HYSTERECTOMY WITH OOPHORECTOMY: Chronic | ICD-10-CM

## 2025-02-14 DIAGNOSIS — N12 PYELONEPHRITIS: Primary | ICD-10-CM

## 2025-02-14 LAB
ANION GAP SERPL CALCULATED.3IONS-SCNC: 6 MMOL/L (ref 4–13)
BACTERIA UR QL AUTO: ABNORMAL /HPF
BASOPHILS # BLD AUTO: 0.06 THOUSANDS/ΜL (ref 0–0.1)
BASOPHILS NFR BLD AUTO: 1 % (ref 0–1)
BILIRUB UR QL STRIP: NEGATIVE
BUN SERPL-MCNC: 8 MG/DL (ref 5–25)
CALCIUM SERPL-MCNC: 9.4 MG/DL (ref 8.4–10.2)
CHLORIDE SERPL-SCNC: 109 MMOL/L (ref 96–108)
CLARITY UR: CLEAR
CO2 SERPL-SCNC: 29 MMOL/L (ref 21–32)
COLOR UR: ABNORMAL
CREAT SERPL-MCNC: 0.55 MG/DL (ref 0.6–1.3)
EOSINOPHIL # BLD AUTO: 0.17 THOUSAND/ΜL (ref 0–0.61)
EOSINOPHIL NFR BLD AUTO: 2 % (ref 0–6)
ERYTHROCYTE [DISTWIDTH] IN BLOOD BY AUTOMATED COUNT: 12.3 % (ref 11.6–15.1)
GFR SERPL CREATININE-BSD FRML MDRD: 108 ML/MIN/1.73SQ M
GLUCOSE SERPL-MCNC: 111 MG/DL (ref 65–140)
GLUCOSE UR STRIP-MCNC: NEGATIVE MG/DL
HCT VFR BLD AUTO: 37.2 % (ref 34.8–46.1)
HGB BLD-MCNC: 12.1 G/DL (ref 11.5–15.4)
HGB UR QL STRIP.AUTO: NEGATIVE
IMM GRANULOCYTES # BLD AUTO: 0.02 THOUSAND/UL (ref 0–0.2)
IMM GRANULOCYTES NFR BLD AUTO: 0 % (ref 0–2)
KETONES UR STRIP-MCNC: NEGATIVE MG/DL
LEUKOCYTE ESTERASE UR QL STRIP: ABNORMAL
LYMPHOCYTES # BLD AUTO: 2.25 THOUSANDS/ΜL (ref 0.6–4.47)
LYMPHOCYTES NFR BLD AUTO: 21 % (ref 14–44)
MCH RBC QN AUTO: 30 PG (ref 26.8–34.3)
MCHC RBC AUTO-ENTMCNC: 32.5 G/DL (ref 31.4–37.4)
MCV RBC AUTO: 92 FL (ref 82–98)
MONOCYTES # BLD AUTO: 0.72 THOUSAND/ΜL (ref 0.17–1.22)
MONOCYTES NFR BLD AUTO: 7 % (ref 4–12)
NEUTROPHILS # BLD AUTO: 7.54 THOUSANDS/ΜL (ref 1.85–7.62)
NEUTS SEG NFR BLD AUTO: 69 % (ref 43–75)
NITRITE UR QL STRIP: POSITIVE
NON-SQ EPI CELLS URNS QL MICRO: ABNORMAL /HPF
NRBC BLD AUTO-RTO: 0 /100 WBCS
PH UR STRIP.AUTO: 6.5 [PH]
PLATELET # BLD AUTO: 193 THOUSANDS/UL (ref 149–390)
PMV BLD AUTO: 10.2 FL (ref 8.9–12.7)
POTASSIUM SERPL-SCNC: 4.5 MMOL/L (ref 3.5–5.3)
PROT UR STRIP-MCNC: NEGATIVE MG/DL
RBC # BLD AUTO: 4.03 MILLION/UL (ref 3.81–5.12)
RBC #/AREA URNS AUTO: ABNORMAL /HPF
SODIUM SERPL-SCNC: 144 MMOL/L (ref 135–147)
SP GR UR STRIP.AUTO: 1.01 (ref 1–1.03)
UROBILINOGEN UR STRIP-ACNC: <2 MG/DL
WBC # BLD AUTO: 10.76 THOUSAND/UL (ref 4.31–10.16)
WBC #/AREA URNS AUTO: ABNORMAL /HPF

## 2025-02-14 PROCEDURE — 96365 THER/PROPH/DIAG IV INF INIT: CPT

## 2025-02-14 PROCEDURE — 85025 COMPLETE CBC W/AUTO DIFF WBC: CPT

## 2025-02-14 PROCEDURE — 74177 CT ABD & PELVIS W/CONTRAST: CPT

## 2025-02-14 PROCEDURE — 81001 URINALYSIS AUTO W/SCOPE: CPT

## 2025-02-14 PROCEDURE — 99284 EMERGENCY DEPT VISIT MOD MDM: CPT

## 2025-02-14 PROCEDURE — 87077 CULTURE AEROBIC IDENTIFY: CPT

## 2025-02-14 PROCEDURE — 87186 SC STD MICRODIL/AGAR DIL: CPT

## 2025-02-14 PROCEDURE — 99285 EMERGENCY DEPT VISIT HI MDM: CPT | Performed by: EMERGENCY MEDICINE

## 2025-02-14 PROCEDURE — 36415 COLL VENOUS BLD VENIPUNCTURE: CPT

## 2025-02-14 PROCEDURE — 80048 BASIC METABOLIC PNL TOTAL CA: CPT

## 2025-02-14 PROCEDURE — 76775 US EXAM ABDO BACK WALL LIM: CPT

## 2025-02-14 PROCEDURE — 96375 TX/PRO/DX INJ NEW DRUG ADDON: CPT

## 2025-02-14 PROCEDURE — 87086 URINE CULTURE/COLONY COUNT: CPT

## 2025-02-14 RX ORDER — KETOROLAC TROMETHAMINE 30 MG/ML
15 INJECTION, SOLUTION INTRAMUSCULAR; INTRAVENOUS ONCE
Status: COMPLETED | OUTPATIENT
Start: 2025-02-14 | End: 2025-02-14

## 2025-02-14 RX ORDER — ONDANSETRON 2 MG/ML
4 INJECTION INTRAMUSCULAR; INTRAVENOUS ONCE
Status: COMPLETED | OUTPATIENT
Start: 2025-02-14 | End: 2025-02-14

## 2025-02-14 RX ADMIN — ONDANSETRON 4 MG: 2 INJECTION INTRAMUSCULAR; INTRAVENOUS at 23:53

## 2025-02-14 RX ADMIN — CEFTRIAXONE SODIUM 1000 MG: 10 INJECTION, POWDER, FOR SOLUTION INTRAVENOUS at 23:56

## 2025-02-14 RX ADMIN — IOHEXOL 85 ML: 350 INJECTION, SOLUTION INTRAVENOUS at 23:41

## 2025-02-14 RX ADMIN — KETOROLAC TROMETHAMINE 15 MG: 30 INJECTION, SOLUTION INTRAMUSCULAR; INTRAVENOUS at 23:55

## 2025-02-15 RX ORDER — CEFPODOXIME PROXETIL 200 MG/1
200 TABLET, FILM COATED ORAL 2 TIMES DAILY
Qty: 20 TABLET | Refills: 0 | Status: SHIPPED | OUTPATIENT
Start: 2025-02-15 | End: 2025-02-25

## 2025-02-15 RX ORDER — ONDANSETRON 4 MG/1
4 TABLET, ORALLY DISINTEGRATING ORAL EVERY 6 HOURS PRN
Qty: 28 TABLET | Refills: 0 | Status: SHIPPED | OUTPATIENT
Start: 2025-02-15 | End: 2025-02-22

## 2025-02-15 NOTE — ED ATTENDING ATTESTATION
2/14/2025  I, Matilda Christina DO, saw and evaluated the patient. I have discussed the patient with the resident/non-physician practitioner and agree with the resident's/non-physician practitioner's findings, Plan of Care, and MDM as documented in the resident's/non-physician practitioner's note, except where noted. All available labs and Radiology studies were reviewed.  I was present for key portions of any procedure(s) performed by the resident/non-physician practitioner and I was immediately available to provide assistance.       At this point I agree with the current assessment done in the Emergency Department.  I have conducted an independent evaluation of this patient a history and physical is as follows:    ED Course         Critical Care Time  Procedures    49-year-old female presents to the ED complaining of left flank pain that started yesterday and has progressively become worse throughout the day.  No urinary complaints, fevers or chills but she has had some nausea.  She is status post hysterectomy with bilateral oophorectomy on 1/9/2025.  Initially for abnormal uterine bleeding and right ovarian cyst, however she states interoperatively they noted adhesions and had to dissect through the bladder and ureter to separate the uterus.  She did have a right ureteral stent which was removed last week.  She had an ultrasound of kidneys and bladder today but has not been called about the results.  On exam she is alert in no acute distress.  Heart is regular without murmur.  Lungs are clear.  Abdomen is soft and nondistended with tenderness in the epigastric and suprapubic and left flank regions without peritoneal signs.  The incisions appear to be well-healed.  Given patient's significant history will obtain CT abdomen pelvis to rule out possible complication from surgery versus kidney stone versus pyelonephritis.  Will check basic labs and urinalysis.  Will control pain and nausea.

## 2025-02-15 NOTE — ED PROVIDER NOTES
Time reflects when diagnosis was documented in both MDM as applicable and the Disposition within this note       Time User Action Codes Description Comment    2/15/2025 12:27 AM AranaPetrona Add [N12] Pyelonephritis     2/15/2025 12:28 AM Petrona Arana Add [N13.30] Hydroureteronephrosis     2/15/2025 12:34 AM Yasmeen Petrona Add [R11.0] Nausea           ED Disposition       ED Disposition   Discharge    Condition   Stable    Date/Time   Sat Feb 15, 2025 12:27 AM    Comment   Beth Dorado discharge to home/self care.                   Assessment & Plan       Medical Decision Making  Amount and/or Complexity of Data Reviewed  Labs: ordered. Decision-making details documented in ED Course.  Radiology: ordered. Decision-making details documented in ED Course.    Risk  Prescription drug management.    Patient is 49 y.o. female with PMH of hysterectomy 5 weeks ago, renal calculi, chronic fatigue syndrome and fibromyalgia who presents to the ED with flank pain. See history and physical documented below    Differential diagnosis included but not limited to renal calculi, pyelonephritis, UTI, postoperative problem, intra-abdominal process. Plan CBC to rule out leukocytosis/anemia, BMP to rule out JUAN JOSÉ, UA to look for infection.  Will CT abdomen pelvis to rule out renal calculi, intra-abdominal process.  Will treat symptomatically with IV Toradol and Zofran and reassess.    View ED course below for further discussion on patient workup.     On review of previous records Outpatient ultrasound today shows bilateral hydroureteronephrosis and caliectasis.    All labs reviewed and utilized in the medical decision making process  All radiology studies independently viewed by me and interpreted by the radiologist.  I reviewed all testing with the patient.     Upon re-evaluation pain improved after medications.  Given dose of ceftriaxone in ED for pyelonephritis.  Advise need to follow-up with PCP and as scheduled.    Given  "prescription for cefpodoxime and Zofran.  Strict return precautions given.  Patient verbalized understanding return cautions      ED Course as of 02/15/25 0322   Fri 2025   2308 WBC(!): 10.76   2308 Leukocytes, UA(!): Moderate   8 Nitrite, UA(!): Positive    Hemoglobin: 12.1   2308 Platelet Count: 193   2321 Creatinine(!): 0.55   2321 WBC, UA(!): 10-20   Sat Feb 15, 2025   0027 CT abdomen pelvis with contrast  IMPRESSION:  Moderate bilateral hydroureteronephrosis extending to the bladder. No visualized obstructing calculus.  Punctate nonobstructing right lower pole renal calculi.         Medications   ketorolac (TORADOL) injection 15 mg (15 mg Intravenous Given 25 2355)   ondansetron (ZOFRAN) injection 4 mg (4 mg Intravenous Given 25 2353)   ceftriaxone (ROCEPHIN) 1 g/50 mL in dextrose IVPB (0 mg Intravenous Stopped 2/15/25 0043)   iohexol (OMNIPAQUE) 350 MG/ML injection (MULTI-DOSE) 85 mL (85 mL Intravenous Given 25 2341)       ED Risk Strat Scores                                                History of Present Illness       Chief Complaint   Patient presents with    Flank Pain     Pt reports \"left flank pain +n starting yesterday. C/o increased urinary frequency. Also being treated for BV\" no meds pta.        Past Medical History:   Diagnosis Date    Abnormal Pap smear of cervix 2011-wnl, 2021-wnl, HRHPV neg    Anesthesia     Pt reports her mom and sister have a history of being able to wake them up after surgery    Chronic fatigue     Chronic pain disorder     cervical spine pain    Disease of thyroid gland 2008    Hashimotos    Fibromyalgia     Hashimoto's thyroiditis     Hiatal hernia     History of transfusion     Iron deficiency anemia     Kidney stone     Miscarriage 2007    MTHFR gene mutation     Polycystic ovary syndrome       Past Surgical History:   Procedure Laterality Date     SECTION      X's 2; second one associated with uterine rupture    " CYSTOSCOPY N/A 2025    Procedure: CYSTOSCOPY WITH RIGHT DOUBLE J (6 Fr X 24 CM) URETERAL STENT INSERTION AND 5 FR OPEN ENDED URETERAL CATHETER INSERTION (REMOVED AT THE END OF THE PROCEDURE);  Surgeon: Edin Poon MD;  Location: [facility];  Service: Gynecology Oncology    DILATION AND CURETTAGE OF UTERUS      DILATION AND CURETTAGE, DIAGNOSTIC / THERAPEUTIC      missed     FL CYSTOGRAM  2025    MT LAPS TOTAL HYSTERECT 250 GM/< W/RMVL TUBE/OVARY N/A 2025    Procedure: EXAM UNDER ANESTHESIA,  ROBOTIC TYPE II RADICAL HYSTERECTOMY, BILATERAL SALPINGO-OOPHORECTOMY;  Surgeon: Edin Poon MD;  Location: [facility];  Service: Gynecology Oncology    WISDOM TOOTH EXTRACTION      WRIST SURGERY Right     exploratory      Family History   Problem Relation Age of Onset    Breast cancer Mother          age 63    Bone cancer Mother         from breast mets    Thyroid disease Mother     Heart failure Father     No Known Problems Sister     No Known Problems Brother     Uterine cancer Maternal Grandmother     No Known Problems Maternal Grandfather     Heart disease Paternal Grandfather     Ovarian cancer Neg Hx     Colon cancer Neg Hx       Social History     Tobacco Use    Smoking status: Never    Smokeless tobacco: Never   Vaping Use    Vaping status: Never Used   Substance Use Topics    Alcohol use: Not Currently    Drug use: Never      E-Cigarette/Vaping    E-Cigarette Use Never User       E-Cigarette/Vaping Substances    Nicotine No     THC No     CBD No     Flavoring No     Other No     Unknown No       I have reviewed and agree with the history as documented.     HPI  Patient is 49 y.o. female with PMH of hysterectomy 5 weeks ago, renal calculi, chronic fatigue syndrome and fibromyalgia who presents to the ED with flank pain.  Patient reports hysterectomy 5 weeks ago and has been recovering well.  Had right ureteral stent placed secondary to hysterectomy and dissecting through  multiple adhesions.  Ureteral stent was removed 1 week ago.  Yesterday started with left flank pain which she initially thought was musculoskeletal but became more persistent.  Reports pain worsening today and becoming more intense.  Also reports nausea today due to intense pain.  Also endorses urinary frequency.  Denies dysuria or hematuria.  Denies fever, chills, chest pain, shortness of breath, V/D.    Review of Systems   Constitutional:  Negative for chills and fever.   HENT:  Negative for ear pain and sore throat.    Respiratory:  Negative for cough and shortness of breath.    Cardiovascular:  Negative for chest pain, palpitations and leg swelling.   Gastrointestinal:  Positive for nausea. Negative for abdominal pain, diarrhea and vomiting.   Genitourinary:  Positive for flank pain. Negative for dysuria, frequency and hematuria.   Musculoskeletal:  Negative for back pain and neck pain.   Skin:  Negative for rash.   Neurological:  Negative for dizziness, light-headedness and headaches.           Objective       ED Triage Vitals [02/14/25 2209]   Temperature Pulse Blood Pressure Respirations SpO2 Patient Position - Orthostatic VS   99 °F (37.2 °C) 102 140/96 18 100 % Sitting      Temp Source Heart Rate Source BP Location FiO2 (%) Pain Score    Oral Monitor Right arm -- 10 - Worst Possible Pain      Vitals      Date and Time Temp Pulse SpO2 Resp BP Pain Score FACES Pain Rating User   02/14/25 2355 -- -- -- -- -- 9 -- BRB   02/14/25 2209 99 °F (37.2 °C) 102 100 % 18 140/96 10 - Worst Possible Pain -- NM            Physical Exam  Vitals reviewed.   Constitutional:       General: She is awake.   HENT:      Head: Normocephalic and atraumatic.      Mouth/Throat:      Mouth: Mucous membranes are moist.   Eyes:      Extraocular Movements: Extraocular movements intact.      Right eye: No nystagmus.      Left eye: No nystagmus.      Conjunctiva/sclera: Conjunctivae normal.      Pupils: Pupils are equal, round, and reactive  to light.   Cardiovascular:      Rate and Rhythm: Normal rate and regular rhythm.      Pulses: Normal pulses.      Heart sounds: Normal heart sounds, S1 normal and S2 normal. Heart sounds not distant. No murmur heard.     No friction rub. No gallop.   Pulmonary:      Breath sounds: No stridor. No wheezing, rhonchi or rales.      Comments: CTA b/l   Abdominal:      General: Bowel sounds are normal.      Palpations: Abdomen is soft.      Tenderness: There is generalized abdominal tenderness. There is left CVA tenderness. There is no right CVA tenderness.      Comments: Laparoscopic surgical sites clean dry intact, no erythema or drainage   Musculoskeletal:      Right lower leg: No edema.      Left lower leg: No edema.   Skin:     General: Skin is warm and dry.      Capillary Refill: Capillary refill takes less than 2 seconds.   Neurological:      Mental Status: She is alert and oriented to person, place, and time.      GCS: GCS eye subscore is 4. GCS verbal subscore is 5. GCS motor subscore is 6.      Cranial Nerves: Cranial nerves 2-12 are intact.      Sensory: Sensation is intact.      Motor: No weakness or pronator drift.      Coordination: Coordination normal. Finger-Nose-Finger Test normal.         Results Reviewed       Procedure Component Value Units Date/Time    Basic metabolic panel [[id number]]  (Abnormal) Collected: 02/14/25 2301    Lab Status: Final result Specimen: Blood from Arm, Left Updated: 02/14/25 2316     Sodium 144 mmol/L      Potassium 4.5 mmol/L      Chloride 109 mmol/L      CO2 29 mmol/L      ANION GAP 6 mmol/L      BUN 8 mg/dL      Creatinine 0.55 mg/dL      Glucose 111 mg/dL      Calcium 9.4 mg/dL      eGFR 108 ml/min/1.73sq m     Narrative:      National Kidney Disease Foundation guidelines for Chronic Kidney Disease (CKD):     Stage 1 with normal or high GFR (GFR > 90 mL/min/1.73 square meters)    Stage 2 Mild CKD (GFR = 60-89 mL/min/1.73 square meters)    Stage 3A Moderate CKD (GFR = 45-59  mL/min/1.73 square meters)    Stage 3B Moderate CKD (GFR = 30-44 mL/min/1.73 square meters)    Stage 4 Severe CKD (GFR = 15-29 mL/min/1.73 square meters)    Stage 5 End Stage CKD (GFR <15 mL/min/1.73 square meters)  Note: GFR calculation is accurate only with a steady state creatinine    Urine Microscopic [[id number]]  (Abnormal) Collected: 02/14/25 2229    Lab Status: Final result Specimen: Urine, Clean Catch Updated: 02/14/25 2309     RBC, UA 1-2 /hpf      WBC, UA 10-20 /hpf      Epithelial Cells Occasional /hpf      Bacteria, UA Occasional /hpf     Urine culture [[id number]] Collected: 02/14/25 2229    Lab Status: In process Specimen: Urine, Clean Catch Updated: 02/14/25 2309    CBC and differential [[id number]]  (Abnormal) Collected: 02/14/25 2301    Lab Status: Final result Specimen: Blood from Arm, Left Updated: 02/14/25 2306     WBC 10.76 Thousand/uL      RBC 4.03 Million/uL      Hemoglobin 12.1 g/dL      Hematocrit 37.2 %      MCV 92 fL      MCH 30.0 pg      MCHC 32.5 g/dL      RDW 12.3 %      MPV 10.2 fL      Platelets 193 Thousands/uL      nRBC 0 /100 WBCs      Segmented % 69 %      Immature Grans % 0 %      Lymphocytes % 21 %      Monocytes % 7 %      Eosinophils Relative 2 %      Basophils Relative 1 %      Absolute Neutrophils 7.54 Thousands/µL      Absolute Immature Grans 0.02 Thousand/uL      Absolute Lymphocytes 2.25 Thousands/µL      Absolute Monocytes 0.72 Thousand/µL      Eosinophils Absolute 0.17 Thousand/µL      Basophils Absolute 0.06 Thousands/µL     UA w Reflex to Microscopic w Reflex to Culture [[id number]]  (Abnormal) Collected: 02/14/25 2229    Lab Status: Final result Specimen: Urine, Clean Catch Updated: 02/14/25 2303     Color, UA Light Yellow     Clarity, UA Clear     Specific Gravity, UA 1.007     pH, UA 6.5     Leukocytes, UA Moderate     Nitrite, UA Positive     Protein, UA Negative mg/dl      Glucose, UA Negative mg/dl      Ketones, UA Negative mg/dl      Urobilinogen, UA <2.0 mg/dl       Bilirubin, UA Negative     Occult Blood, UA Negative            CT abdomen pelvis with contrast   Final Interpretation by Jesús Jiménez DO (02/15 0024)   Moderate bilateral hydroureteronephrosis extending to the bladder. No visualized obstructing calculus.   Punctate nonobstructing right lower pole renal calculi.            Workstation performed: [id number]             Procedures    ED Medication and Procedure Management   Prior to Admission Medications   Prescriptions Last Dose Informant Patient Reported? Taking?   NP Thyroid 30 MG tablet  Self Yes No   Si mg 3 times a week and 60 mg 4 times a week ( alternate)   acetaminophen (TYLENOL) 325 mg tablet  Self No No   Sig: Take 3 tablets (975 mg total) by mouth every 8 (eight) hours   ibuprofen (MOTRIN) 200 mg tablet  Self Yes No   Sig: Take 600 mg by mouth every 6 (six) hours as needed for mild pain   metroNIDAZOLE (METROGEL) 0.75 % vaginal gel   No No   Sig: Insert 1 Application into the vagina daily at bedtime for 5 days   oxyCODONE (Roxicodone) 5 immediate release tablet  Self No No   Sig: Take 1 tablet (5 mg total) by mouth every 6 (six) hours as needed for severe pain Max Daily Amount: 20 mg   oxybutynin (DITROPAN) 5 mg tablet  Self No No   Sig: Take 1 tablet (5 mg total) by mouth 3 (three) times a day as needed (bladder spasms)   propranolol (INDERAL) 10 mg tablet  Self Yes No   Sig: Take 10-20 mg by mouth   Patient not taking: Reported on 2025   rimegepant sulfate (NURTEC) 75 mg TBDP  Self Yes No   Sig: Take 75 mg by mouth if needed      Facility-Administered Medications: None     Discharge Medication List as of 2/15/2025 12:35 AM        START taking these medications    Details   cefpodoxime (VANTIN) 200 mg tablet Take 1 tablet (200 mg total) by mouth 2 (two) times a day for 10 days, Starting Sat 2/15/2025, Until Tue 2025, Normal      ondansetron (ZOFRAN-ODT) 4 mg disintegrating tablet Take 1 tablet (4 mg total) by mouth every 6 (six) hours  as needed for nausea or vomiting for up to 7 days, Starting Sat 2/15/2025, Until Sat 2/22/2025 at 2359, Normal           CONTINUE these medications which have NOT CHANGED    Details   acetaminophen (TYLENOL) 325 mg tablet Take 3 tablets (975 mg total) by mouth every 8 (eight) hours, Starting Sat 1/11/2025, Normal      ibuprofen (MOTRIN) 200 mg tablet Take 600 mg by mouth every 6 (six) hours as needed for mild pain, Historical Med      metroNIDAZOLE (METROGEL) 0.75 % vaginal gel Insert 1 Application into the vagina daily at bedtime for 5 days, Starting Mon 2/10/2025, Until Sat 2/15/2025, Normal      NP Thyroid 30 MG tablet 90 mg 3 times a week and 60 mg 4 times a week ( alternate), Historical Med      oxybutynin (DITROPAN) 5 mg tablet Take 1 tablet (5 mg total) by mouth 3 (three) times a day as needed (bladder spasms), Starting Sat 1/11/2025, Normal      oxyCODONE (Roxicodone) 5 immediate release tablet Take 1 tablet (5 mg total) by mouth every 6 (six) hours as needed for severe pain Max Daily Amount: 20 mg, Starting Thu 1/9/2025, Normal      propranolol (INDERAL) 10 mg tablet Take 10-20 mg by mouth, Starting Tue 9/10/2024, Historical Med      rimegepant sulfate (NURTEC) 75 mg TBDP Take 75 mg by mouth if needed, Starting Mon 12/11/2023, Historical Med           No discharge procedures on file.  ED SEPSIS DOCUMENTATION   Time reflects when diagnosis was documented in both MDM as applicable and the Disposition within this note       Time User Action Codes Description Comment    2/15/2025 12:27 AM Petrona Arana [N12] Pyelonephritis     2/15/2025 12:28 AM Petrona Arana [N13.30] Hydroureteronephrosis     2/15/2025 12:34 AM Petrona Arana [R11.0] Nausea                  Petrona Arana, DO  02/15/25 0322

## 2025-02-15 NOTE — DISCHARGE INSTRUCTIONS
You were seen in ED for flank pain and urinary complaints. You have pyelonephritis, a urine infection extending to your kidneys.     Please take antibiotic for next 10 days. Follow up with gyn as scheduled.    Follow up with primary care doctor.     Return to ED if worsening pain, unable to tolerating eating/drinking, if symptoms do not resolve.

## 2025-02-16 ENCOUNTER — RESULTS FOLLOW-UP (OUTPATIENT)
Dept: EMERGENCY DEPT | Facility: HOSPITAL | Age: 50
End: 2025-02-16

## 2025-02-17 LAB — BACTERIA UR CULT: ABNORMAL

## 2025-02-25 ENCOUNTER — TELEPHONE (OUTPATIENT)
Dept: GYNECOLOGIC ONCOLOGY | Facility: CLINIC | Age: 50
End: 2025-02-25

## 2025-02-25 ENCOUNTER — OFFICE VISIT (OUTPATIENT)
Dept: GYNECOLOGIC ONCOLOGY | Facility: CLINIC | Age: 50
End: 2025-02-25
Payer: COMMERCIAL

## 2025-02-25 VITALS
SYSTOLIC BLOOD PRESSURE: 140 MMHG | TEMPERATURE: 98.2 F | HEART RATE: 81 BPM | DIASTOLIC BLOOD PRESSURE: 88 MMHG | OXYGEN SATURATION: 100 % | RESPIRATION RATE: 16 BRPM | BODY MASS INDEX: 19.79 KG/M2 | HEIGHT: 65 IN | WEIGHT: 118.8 LBS

## 2025-02-25 DIAGNOSIS — N13.39 OTHER HYDRONEPHROSIS: ICD-10-CM

## 2025-02-25 DIAGNOSIS — T81.328A DEHISCENCE OF VAGINAL CUFF, INITIAL ENCOUNTER: Primary | ICD-10-CM

## 2025-02-25 PROBLEM — Z90.710 S/P HYSTERECTOMY WITH OOPHORECTOMY: Chronic | Status: RESOLVED | Noted: 2025-01-09 | Resolved: 2025-02-25

## 2025-02-25 PROBLEM — B96.89 BACTERIAL VAGINOSIS: Status: RESOLVED | Noted: 2025-02-04 | Resolved: 2025-02-25

## 2025-02-25 PROBLEM — D50.0 IRON DEFICIENCY ANEMIA DUE TO CHRONIC BLOOD LOSS: Status: RESOLVED | Noted: 2024-08-09 | Resolved: 2025-02-25

## 2025-02-25 PROBLEM — Z90.721 S/P HYSTERECTOMY WITH OOPHORECTOMY: Chronic | Status: RESOLVED | Noted: 2025-01-09 | Resolved: 2025-02-25

## 2025-02-25 PROBLEM — N76.0 BACTERIAL VAGINOSIS: Status: RESOLVED | Noted: 2025-02-04 | Resolved: 2025-02-25

## 2025-02-25 PROCEDURE — 99213 OFFICE O/P EST LOW 20 MIN: CPT | Performed by: OBSTETRICS & GYNECOLOGY

## 2025-02-25 NOTE — ASSESSMENT & PLAN NOTE
Patient has evidence of bilateral and quite symmetric pelviectasis and hydronephrosis to the mid pelvis without evidence of obstructing masses or processes.  This could be functional which I favor given how symmetric this appears.  However, given history of complex pelvic surgery I will order a nuclear medicine kidney function/flow scan to further evaluate and rule out the possibility of obstruction.  I will follow-up on results.  Orders:  •  NM kidney w flow and function w wo rx; Future

## 2025-02-25 NOTE — ASSESSMENT & PLAN NOTE
Patient has evidence of vaginal cuff dehiscence on exam.  Fortunately the pelvis is reperitonealized with no evidence of visceral prolapse or evisceration.  Patient is using topical vaginal estrogen.    I emphasized strict importance of avoiding intercourse or anything per vagina.  Strict pelvic rest.  Will reevaluate in 4 weeks.  Patient understands that if spontaneous resolution does not occur, she might benefit from Examiner anesthesia and vaginal cuff revision.  Strict precautions given.

## 2025-02-25 NOTE — PROGRESS NOTES
Name: Beth Dorado      : 1975      MRN: [id number]  Encounter Provider: Edin Poon MD  Encounter Date: 2025   Encounter department: [facility] [place]  :  Assessment & Plan  Other hydronephrosis  Patient has evidence of bilateral and quite symmetric pelviectasis and hydronephrosis to the mid pelvis without evidence of obstructing masses or processes.  This could be functional which I favor given how symmetric this appears.  However, given history of complex pelvic surgery I will order a nuclear medicine kidney function/flow scan to further evaluate and rule out the possibility of obstruction.  I will follow-up on results.  Orders:  •  NM kidney w flow and function w wo rx; Future    Dehiscence of vaginal cuff, initial encounter  Patient has evidence of vaginal cuff dehiscence on exam.  Fortunately the pelvis is reperitonealized with no evidence of visceral prolapse or evisceration.  Patient is using topical vaginal estrogen.    I emphasized strict importance of avoiding intercourse or anything per vagina.  Strict pelvic rest.  Will reevaluate in 4 weeks.  Patient understands that if spontaneous resolution does not occur, she might benefit from Examiner anesthesia and vaginal cuff revision.  Strict precautions given.               History of Present Illness   Reason for Visit / CC: Vaginal cuff re-exam   Beth Dorado is a 49 y.o. female   Patient is approximately 7 weeks out after complex pelvic surgery including Exam under anesthesia, robotic type II radical hysterectomy, bilateral salpingo-oophorectomy, cystoscopy with right double-J ureteral stent insertion and left 5 Citizen of [place] open-ended ureteral catheter insertion.  The ureteral catheter on the left was removed at the end of the procedure.  Since last visit, patient saw Dr. Arteaga in urology on  who proceeded to remove right ureteral stent.  At last visit she was noted to have bacterial vaginosis and  "what appeared to be an incompletely healed vaginal cuff without evidence of evisceration.  She presents today for follow-up.  Of note, 7 days after stent removal she was seen in the emergency department where she was found to have pyelonephritis.  She was prescribed a single dose of ceftriaxone and then prescribed oral antibiotics which she took for 5 days.  Symptoms mostly resolved.  Denies fever.  Denies any ongoing vaginal drainage or discharge.  Reports normal bowel and bladder function.  Denies pain or other complaints.  She presents today for follow-up.      Pertinent Medical History   Hypothyroidism, MTHFR, herniated cervical disc.    As per problem list.  Noncontributory.       Review of Systems A complete review of systems is negative other than that noted above in the HPI.       Objective   /88 (BP Location: Left arm, Patient Position: Sitting, Cuff Size: Standard)   Pulse 81   Temp 98.2 °F (36.8 °C) (Temporal)   Resp 16   Ht 5' 5\" (1.651 m)   Wt 53.9 kg (118 lb 12.8 oz)   LMP  (LMP Unknown)   SpO2 100%   BMI 19.77 kg/m²     Body mass index is 19.77 kg/m².  Pain Screening:  Pain Score: 0-No pain    Physical Exam   Abdomen: Incisions well-healed.  No hernias.  Pelvic: Normal external genitalia.  Vulva with no lesions.  Vagina with scant clear discharge.  Vaginal cuff appears open but completely reepithelialized without evidence of visible or prolapsing visceral contents.  Gentle bimanual exam reveals completely reperitonealized area above the vaginal cuff.    Edin Poon MD, JOSIANE, FACOG, FACS  2/25/2025  4:20 PM      "

## 2025-02-25 NOTE — TELEPHONE ENCOUNTER
I left a message for the patient to call the office to let us know if she would be able to come in earlier at 11:30AM or 2:45PM today, 2/25/2025, for the appointment with Dr. Poon. I informed the patient that if she is unable to come in sooner, Dr. Poon will still see her at 3:45PM.

## 2025-03-04 ENCOUNTER — HOSPITAL ENCOUNTER (OUTPATIENT)
Dept: RADIOLOGY | Facility: HOSPITAL | Age: 50
Discharge: HOME | End: 2025-03-04
Attending: OBSTETRICS & GYNECOLOGY

## 2025-03-04 DIAGNOSIS — N13.39 OTHER HYDRONEPHROSIS: ICD-10-CM

## 2025-03-06 ENCOUNTER — APPOINTMENT (OUTPATIENT)
Dept: LAB | Facility: CLINIC | Age: 50
End: 2025-03-06
Payer: COMMERCIAL

## 2025-03-06 ENCOUNTER — HOSPITAL ENCOUNTER (OUTPATIENT)
Dept: NUCLEAR MEDICINE | Facility: HOSPITAL | Age: 50
Discharge: HOME | End: 2025-03-06
Attending: OBSTETRICS & GYNECOLOGY
Payer: COMMERCIAL

## 2025-03-06 DIAGNOSIS — D50.0 IRON DEFICIENCY ANEMIA DUE TO CHRONIC BLOOD LOSS: ICD-10-CM

## 2025-03-06 LAB
BASOPHILS # BLD AUTO: 0.05 THOUSANDS/ÂΜL (ref 0–0.1)
BASOPHILS NFR BLD AUTO: 1 % (ref 0–1)
EOSINOPHIL # BLD AUTO: 0.14 THOUSAND/ÂΜL (ref 0–0.61)
EOSINOPHIL NFR BLD AUTO: 3 % (ref 0–6)
ERYTHROCYTE [DISTWIDTH] IN BLOOD BY AUTOMATED COUNT: 12.8 % (ref 11.6–15.1)
FERRITIN SERPL-MCNC: 103 NG/ML (ref 11–307)
HCT VFR BLD AUTO: 36.8 % (ref 34.8–46.1)
HGB BLD-MCNC: 12.1 G/DL (ref 11.5–15.4)
IMM GRANULOCYTES # BLD AUTO: 0.02 THOUSAND/UL (ref 0–0.2)
IMM GRANULOCYTES NFR BLD AUTO: 0 % (ref 0–2)
IRON SATN MFR SERPL: 35 % (ref 15–50)
IRON SERPL-MCNC: 94 UG/DL (ref 50–212)
LYMPHOCYTES # BLD AUTO: 2.26 THOUSANDS/ÂΜL (ref 0.6–4.47)
LYMPHOCYTES NFR BLD AUTO: 43 % (ref 14–44)
MCH RBC QN AUTO: 31.1 PG (ref 26.8–34.3)
MCHC RBC AUTO-ENTMCNC: 32.9 G/DL (ref 31.4–37.4)
MCV RBC AUTO: 95 FL (ref 82–98)
MONOCYTES # BLD AUTO: 0.37 THOUSAND/ÂΜL (ref 0.17–1.22)
MONOCYTES NFR BLD AUTO: 7 % (ref 4–12)
NEUTROPHILS # BLD AUTO: 2.48 THOUSANDS/ÂΜL (ref 1.85–7.62)
NEUTS SEG NFR BLD AUTO: 46 % (ref 43–75)
NRBC BLD AUTO-RTO: 0 /100 WBCS
PLATELET # BLD AUTO: 168 THOUSANDS/UL (ref 149–390)
PMV BLD AUTO: 10.9 FL (ref 8.9–12.7)
RBC # BLD AUTO: 3.89 MILLION/UL (ref 3.81–5.12)
TIBC SERPL-MCNC: 266 UG/DL (ref 250–450)
TRANSFERRIN SERPL-MCNC: 190 MG/DL (ref 203–362)
UIBC SERPL-MCNC: 172 UG/DL (ref 155–355)
WBC # BLD AUTO: 5.32 THOUSAND/UL (ref 4.31–10.16)

## 2025-03-06 PROCEDURE — A9562 TC99M MERTIATIDE: HCPCS

## 2025-03-06 PROCEDURE — 83550 IRON BINDING TEST: CPT

## 2025-03-06 PROCEDURE — 78709 K FLOW/FUNCT IMAGE MULTIPLE: CPT

## 2025-03-06 PROCEDURE — 36415 COLL VENOUS BLD VENIPUNCTURE: CPT

## 2025-03-06 PROCEDURE — 85025 COMPLETE CBC W/AUTO DIFF WBC: CPT

## 2025-03-06 PROCEDURE — 82728 ASSAY OF FERRITIN: CPT

## 2025-03-06 PROCEDURE — 83540 ASSAY OF IRON: CPT

## 2025-03-06 RX ORDER — FUROSEMIDE 10 MG/ML
40 INJECTION INTRAMUSCULAR; INTRAVENOUS ONCE
Status: COMPLETED | OUTPATIENT
Start: 2025-03-06 | End: 2025-03-06

## 2025-03-06 RX ADMIN — FUROSEMIDE 40 MG: 10 INJECTION, SOLUTION INTRAMUSCULAR; INTRAVENOUS at 11:30

## 2025-03-07 DIAGNOSIS — T81.328A DEHISCENCE OF VAGINAL CUFF, INITIAL ENCOUNTER: Primary | ICD-10-CM

## 2025-03-07 RX ORDER — CONJUGATED ESTROGENS 0.62 MG/G
CREAM VAGINAL
Qty: 30 G | Refills: 5 | Status: SHIPPED | OUTPATIENT
Start: 2025-03-07

## 2025-03-18 ENCOUNTER — OFFICE VISIT (OUTPATIENT)
Dept: HEMATOLOGY ONCOLOGY | Facility: CLINIC | Age: 50
End: 2025-03-18
Payer: COMMERCIAL

## 2025-03-18 VITALS
HEART RATE: 86 BPM | RESPIRATION RATE: 17 BRPM | SYSTOLIC BLOOD PRESSURE: 112 MMHG | TEMPERATURE: 98.4 F | OXYGEN SATURATION: 98 % | BODY MASS INDEX: 20.08 KG/M2 | HEIGHT: 65 IN | WEIGHT: 120.5 LBS | DIASTOLIC BLOOD PRESSURE: 80 MMHG

## 2025-03-18 DIAGNOSIS — D50.0 IRON DEFICIENCY ANEMIA DUE TO CHRONIC BLOOD LOSS: Primary | ICD-10-CM

## 2025-03-18 PROCEDURE — 99214 OFFICE O/P EST MOD 30 MIN: CPT

## 2025-03-18 NOTE — PROGRESS NOTES
Name: Beth Dorado      : 1975      MRN: [id number]  Encounter Provider: LAKISHA Blanton  Encounter Date: 3/18/2025   Encounter department: [facility] [place]  :  Assessment & Plan  Iron deficiency anemia due to chronic blood loss    Orders:    CBC; Future    Iron; Future    TIBC Panel (incl. Iron, TIBC, % Iron Saturation); Future    Ferritin; Future    49-year-old female with history of iron deficiency.  We reviewed that her counts have been stable even postsurgery.  Since the source of her iron deficiency, menorrhagia, has been eliminated, anticipate her counts will improve.    Offered patient 2 options, either be discharged and have continued monitoring with her PCP or we can see her back in 6 months and if her counts remain stable, we will discharge her back to her PCP for continued monitoring.  Patient would prefer a 6-month follow-up.  We will see her with labs prior.  Patient is aware to contact us for any additional questions/concerns or worsening symptoms.    Return in about 6 months (around 2025) for Provide lab scripts.    History of Present Illness   Chief Complaint   Patient presents with    Follow-up     Pertinent Medical History     25: Patient presents for follow-up of iron deficiency.She has PMH significant for fibromyalgia, Hashimoto thyroiditis, menorrhagia and migraines.  Patient's iron deficiency anemia was noted in 2024 and was started on oral iron supplements.  She is not able to tolerate oral iron supplements due to nausea, constipation, abdominal pain.  Patient is also not able to tolerate iron rich foods due to her fibromyalgia and a specific diet she follows for this.    Patient is status post radical hysterectomy, bilateral salpingo-oophorectomy, cystoscopy with right ureteral stent insertion.  Patient's stent was removed 2025.  Since the surgery patient has had complications with bacterial vaginosis and incompletely healed  vaginal cuff.  After the removal of her stent, approximately 7 days after, patient was found to have pyelonephritis.  She received 1 single dose of ceftriaxone and was prescribed oral antibiotics for 5 days.    Patient reports for the past 2 days she has been feeling a little bit better since the surgery.  Fatigue is improved and she was able to take a walk yesterday.  Her energy is building up.  She denies any dizziness, lightheadedness or palpitations.  No CP, SOB, shortness of breath.  Patient denies abnormal bleeding: epistaxis, gingival bleeding, hematuria, dark tarry stools.    Labs:  3/6/2025: Hgb 12.1, MCV 95, WBC 5.32, platelets 168,000, serum iron 94, iron saturation 35%, ferritin 103    12/6/2024: Hgb 13.5, MCV 93.1, WBC 7.0, platelets 172,000, serum iron 2014, ferritin 132     7/17/2024: Hgb 10.3, MCV 79.7, WBC 7.9, Platelets 254,000, serum iron 42, iron saturation 11%, ferritin 6        12/12/2024: Beth Dorado is a 49 y.o. adult who was initially seen on 8/9/2024 for iron deficiency anemia.  She has PMH significant for fibromyalgia, Hashimoto thyroiditis, menorrhagia and migraines.     Patient's iron deficiency anemia was noted in March 2024 and was started on oral iron supplements.  She is not able to tolerate oral iron supplements due to nausea, constipation, abdominal pain.      The initial office visit, patient reported her menstrual cycles to be heavy, lasting about 4 to 7 days, with 4 to 5 days on the heavier side where she was changing a pad/tampon every 30 minutes to 1 hour.  Since summer, she reports that her menstrual cycles have been very light.  She is currently on her cycle and reports today she had increased bleeding and passing large clots.     Patient is status post IV iron treatments, Venofer 300 mg x 4 doses (8/27/2024, 9/3/2024, 9/25/2024, 10/18/2024).  Patient reports she had a difficult time with peripheral access and she had to space those infusions due to body aches after the  "infusions.    Patient responded well to the iron infusions.  Since she is not able to tolerate oral iron supplements or has a specific diet to follow for fibromyalgia, she is not able to take in a lot of iron rich foods.     Surgical history:  2 child births, second one with uterine rupture requiring PRBC transfusion  [place]son teeth extraction - does not remember having prolonged bleeding    Treatment:   Venofer 300 mg x 4 doses = /27/2024, 9/3/2024, 9/25/2024, 10/18/2024    Review of Systems   Constitutional:  Positive for fatigue (mild). Negative for activity change, appetite change, diaphoresis, fever and unexpected weight change.   HENT:  Negative for nosebleeds.    Eyes: Negative.    Respiratory:  Negative for cough, chest tightness and shortness of breath.    Cardiovascular:  Negative for chest pain, palpitations and leg swelling.   Gastrointestinal:  Negative for abdominal pain and blood in stool.   Endocrine: Negative.    Genitourinary:  Negative for hematuria.   Musculoskeletal: Negative.    Skin: Negative.    Neurological:  Negative for dizziness, light-headedness and headaches.   Hematological: Negative.            Objective   /80 (BP Location: Left arm, Patient Position: Sitting, Cuff Size: Adult)   Pulse 86   Temp 98.4 °F (36.9 °C) (Temporal)   Resp 17   Ht 5' 5\" (1.651 m)   Wt 54.7 kg (120 lb 8 oz)   LMP  (LMP Unknown)   SpO2 98%   BMI 20.05 kg/m²     Physical Exam  Constitutional:       Appearance: Normal appearance.   HENT:      Head: Normocephalic and atraumatic.   Cardiovascular:      Rate and Rhythm: Regular rhythm.      Heart sounds: Normal heart sounds.   Pulmonary:      Breath sounds: Normal breath sounds.   Musculoskeletal:      Cervical back: Normal range of motion.      Right lower leg: No edema.      Left lower leg: No edema.   Skin:     General: Skin is warm and dry.   Neurological:      Mental Status: She is alert and oriented to person, place, and time.   Psychiatric:         " Mood and Affect: Mood normal.         Behavior: Behavior normal.         Thought Content: Thought content normal.         Judgment: Judgment normal.         Labs: I have reviewed the following labs:  Results for orders placed or performed in visit on 03/06/25   TIBC Panel (incl. Iron, TIBC, % Iron Saturation)   Result Value Ref Range    Iron Saturation 35 15 - 50 %    TIBC 266 250 - 450 ug/dL    Iron 94 50 - 212 ug/dL    Transferrin 190 (L) 203 - 362 mg/dL    UIBC 172 155 - 355 ug/dL   CBC and differential   Result Value Ref Range    WBC 5.32 4.31 - 10.16 Thousand/uL    RBC 3.89 3.81 - 5.12 Million/uL    Hemoglobin 12.1 11.5 - 15.4 g/dL    Hematocrit 36.8 34.8 - 46.1 %    MCV 95 82 - 98 fL    MCH 31.1 26.8 - 34.3 pg    MCHC 32.9 31.4 - 37.4 g/dL    RDW 12.8 11.6 - 15.1 %    MPV 10.9 8.9 - 12.7 fL    Platelets 168 149 - 390 Thousands/uL    nRBC 0 /100 WBCs    Segmented % 46 43 - 75 %    Immature Grans % 0 0 - 2 %    Lymphocytes % 43 14 - 44 %    Monocytes % 7 4 - 12 %    Eosinophils Relative 3 0 - 6 %    Basophils Relative 1 0 - 1 %    Absolute Neutrophils 2.48 1.85 - 7.62 Thousands/µL    Absolute Immature Grans 0.02 0.00 - 0.20 Thousand/uL    Absolute Lymphocytes 2.26 0.60 - 4.47 Thousands/µL    Absolute Monocytes 0.37 0.17 - 1.22 Thousand/µL    Eosinophils Absolute 0.14 0.00 - 0.61 Thousand/µL    Basophils Absolute 0.05 0.00 - 0.10 Thousands/µL   Result Value Ref Range    Ferritin 103 11 - 307 ng/mL   I spent 30 minutes in chart review, counseling, coordination of care, and documentation.    This note has been generated by voice recognition software system.  Therefore, there may be spelling, grammar, and or syntax errors. Please contact if questions arise.

## 2025-03-19 NOTE — PROGRESS NOTES
Name: Beth Dorado      : 1975      MRN: [id number]  Encounter Provider: Inge Espinal DO  Encounter Date: 3/20/2025   Encounter department: [facility]  :  Assessment & Plan  Encounter for annual routine gynecological examination         Urinary urgency    Orders:    Urine culture    Foul smelling urine    Orders:    Urine culture    Vaginal discharge    Orders:    POCT wet mount      She does not require a Pap    She refuses a mammogram. I explained that a thermogram is not a substitute for a mammogram.  Her mother had breast cancer.  Urged her to consider.   Discussed self breast exams    colon cancer screening - Cologuard was negative in 2024    Hot flashes-discussed treatment for hot flashes.  Would avoid estrogen due to the MTHFR mutation.  She will send the results of this to me as I do not see them in her chart.    Vaginal dehiscence - She will continue using the vaginal estrogen as the risk is much lower and the benefit of healing of the vaginal cuff outweighs the small risk.  I recommended that she avoid any other vaginal medications or suppositories.  She is seeing Gyn Onc next week    Vaginal discharge - Wet mount is negative    Urinary odor - clean catch urine sent    discussed preventive care, regular exercise and a healthy diet      History of Present Illness   HPI  Beth Dorado is a 49 y.o. female who presents for yearly. She has had a difficult 3 months.    S/p hysterectomy and BSO in January with endometriosis, adenomyosis and fibroids, endometrial polyp.  She had a frozen pelvis.  This was very complicated and extensive with adhesions and stage 4 endometriosis. She required a stent that has been removed. Pathology was benign.  Post operatively, she had a pyelonephritis. She also had a vaginal cuff dehiscence, this seems to be healing.  The pelvis has reperitonealized so there is no prolapse of bowel.      She is having some burning with voiding, cloudy urine,  odor  No bleeding  She is using vaginal estrogen.    Last night she used an herbal yeast suppository because she was having some itching.    Normal renal scan earlier this month    She has not had a mammogram, had thermograms.  We have discusssed this in the past.      Review of Systems   Constitutional: Negative.    Gastrointestinal: Negative.    Genitourinary:  Positive for dysuria and vaginal discharge. Negative for vaginal bleeding.        Urinary odor            Objective   LMP  (LMP Unknown)      Physical Exam  Vitals and nursing note reviewed. Exam conducted with a chaperone present.   Constitutional:       Appearance: She is well-developed.   HENT:      Head: Normocephalic and atraumatic.   Neck:      Thyroid: No thyromegaly.   Cardiovascular:      Rate and Rhythm: Normal rate and regular rhythm.   Pulmonary:      Effort: Pulmonary effort is normal.      Breath sounds: Normal breath sounds.   Chest:   Breasts:     Right: Normal.      Left: Normal.      Comments: Examined seated and supine  Abdominal:      Palpations: Abdomen is soft.   Genitourinary:     General: Normal vulva.      Vagina: Vaginal discharge present. No erythema, tenderness or lesions.      Uterus: Absent.       Adnexa: Right adnexa normal and left adnexa normal.      Rectum: Normal.      Comments: Cautious pelvic exam done  Small approx 1cm area of cuff that is not approximated  No erythema  Difficult to visualize due to residual vaginal estrogen and antifungal she used yesterday  Musculoskeletal:      Cervical back: Neck supple.   Skin:     General: Skin is warm and dry.      Capillary Refill: Capillary refill takes less than 2 seconds.   Neurological:      Mental Status: She is alert.   Psychiatric:         Mood and Affect: Mood normal.

## 2025-03-20 ENCOUNTER — ANNUAL EXAM (OUTPATIENT)
Dept: GYNECOLOGY | Facility: CLINIC | Age: 50
End: 2025-03-20
Payer: COMMERCIAL

## 2025-03-20 VITALS
DIASTOLIC BLOOD PRESSURE: 70 MMHG | HEIGHT: 65 IN | WEIGHT: 119.2 LBS | SYSTOLIC BLOOD PRESSURE: 100 MMHG | BODY MASS INDEX: 19.86 KG/M2

## 2025-03-20 DIAGNOSIS — N89.8 VAGINAL DISCHARGE: ICD-10-CM

## 2025-03-20 DIAGNOSIS — R39.15 URINARY URGENCY: ICD-10-CM

## 2025-03-20 DIAGNOSIS — R82.90 FOUL SMELLING URINE: ICD-10-CM

## 2025-03-20 DIAGNOSIS — Z01.419 ENCOUNTER FOR ANNUAL ROUTINE GYNECOLOGICAL EXAMINATION: Primary | ICD-10-CM

## 2025-03-20 LAB
BV WHIFF TEST VAG QL: NEGATIVE
CLUE CELLS SPEC QL WET PREP: NEGATIVE
SL AMB POCT WET MOUNT: NORMAL
T VAGINALIS VAG QL WET PREP: NEGATIVE
YEAST VAG QL WET PREP: NEGATIVE

## 2025-03-20 PROCEDURE — 87186 SC STD MICRODIL/AGAR DIL: CPT | Performed by: OBSTETRICS & GYNECOLOGY

## 2025-03-20 PROCEDURE — 87077 CULTURE AEROBIC IDENTIFY: CPT | Performed by: OBSTETRICS & GYNECOLOGY

## 2025-03-20 PROCEDURE — 99396 PREV VISIT EST AGE 40-64: CPT | Performed by: OBSTETRICS & GYNECOLOGY

## 2025-03-20 PROCEDURE — 87210 SMEAR WET MOUNT SALINE/INK: CPT | Performed by: OBSTETRICS & GYNECOLOGY

## 2025-03-20 PROCEDURE — 87086 URINE CULTURE/COLONY COUNT: CPT | Performed by: OBSTETRICS & GYNECOLOGY

## 2025-03-21 ENCOUNTER — RESULTS FOLLOW-UP (OUTPATIENT)
Dept: GYNECOLOGY | Facility: CLINIC | Age: 50
End: 2025-03-21

## 2025-03-21 DIAGNOSIS — N39.0 UTI (URINARY TRACT INFECTION) DUE TO ENTEROCOCCUS: Primary | ICD-10-CM

## 2025-03-21 DIAGNOSIS — B95.2 UTI (URINARY TRACT INFECTION) DUE TO ENTEROCOCCUS: Primary | ICD-10-CM

## 2025-03-21 RX ORDER — CEPHALEXIN 500 MG/1
500 CAPSULE ORAL EVERY 6 HOURS SCHEDULED
Qty: 28 CAPSULE | Refills: 0 | Status: SHIPPED | OUTPATIENT
Start: 2025-03-21 | End: 2025-03-28

## 2025-03-21 NOTE — PROGRESS NOTES
Spoke with patient over the phone.  Her preliminary culture came back with E. coli.  I recommended treating as she has had surgery with a stent that has since been removed.  She also had a recent pyelonephritis.  She is agreeable.  She has sensitivities to several antibiotics.  She has done well on cephalosporin.  I do not have the sensitivities to this culture but last month, she had a positive culture with E. coli that was sensitivity to cefazolin.  Prescription sent for cephalexin for 7 days.  We will call her with the final culture.

## 2025-03-22 LAB — BACTERIA UR CULT: ABNORMAL

## 2025-03-25 ENCOUNTER — OFFICE VISIT (OUTPATIENT)
Dept: GYNECOLOGIC ONCOLOGY | Facility: CLINIC | Age: 50
End: 2025-03-25
Payer: COMMERCIAL

## 2025-03-25 ENCOUNTER — TELEPHONE (OUTPATIENT)
Dept: GYNECOLOGIC ONCOLOGY | Facility: CLINIC | Age: 50
End: 2025-03-25

## 2025-03-25 VITALS
SYSTOLIC BLOOD PRESSURE: 124 MMHG | HEART RATE: 81 BPM | OXYGEN SATURATION: 99 % | TEMPERATURE: 98.2 F | HEIGHT: 65 IN | DIASTOLIC BLOOD PRESSURE: 80 MMHG | BODY MASS INDEX: 20.16 KG/M2 | WEIGHT: 121 LBS | RESPIRATION RATE: 16 BRPM

## 2025-03-25 DIAGNOSIS — T81.328A DEHISCENCE OF VAGINAL CUFF, INITIAL ENCOUNTER: Primary | ICD-10-CM

## 2025-03-25 DIAGNOSIS — N13.39 OTHER HYDRONEPHROSIS: ICD-10-CM

## 2025-03-25 PROCEDURE — 99214 OFFICE O/P EST MOD 30 MIN: CPT | Performed by: OBSTETRICS & GYNECOLOGY

## 2025-03-25 PROCEDURE — 99459 PELVIC EXAMINATION: CPT | Performed by: OBSTETRICS & GYNECOLOGY

## 2025-03-25 NOTE — PROGRESS NOTES
Name: Beth Dorado      : 1975      MRN: [id number]  Encounter Provider: Edin Poon MD  Encounter Date: 3/25/2025   Encounter department: [facility] [place]  :  Assessment & Plan  Dehiscence of vaginal cuff, initial encounter  I have independently obtained history from patient today.  Pelvic exam performed.    Supplied extended conservative management with strict pelvic rest and topical vaginal estrogen, cuff dehiscence is still persistent.  After counseling, have recommended and she has consented to proceed to the operating room for exam under anesthesia, vaginal cuff revision, cystoscopy.    Patient is healthy with good exercise tolerance.  No additional cardiovascular workup is indicated.  No specific preoperative testing or intervention needed.  All questions answered.    The patient was counseled regarding indications, risks, benefits and alternatives to surgical management.  We discussed risks including but not limited to bleeding and potential need for blood transfusions, infection, pain, injury to surrounding organs and neurovascular structures.  Patient understands potential risks associated with stress of surgery and general anesthesia including but not limited to acute cardiac events, venous thromboembolism, etc.  Patient consents for blood transfusions if those are deemed necessary during the course of care.  She understands risks include but are not limited to hypersensitivity reactions and infection with blood-borne pathogens.  Patient verbalizes understanding, agrees and wants to proceed.  Informed consent form signed. All questions answered to patient's satisfaction.          Orders:  •  Case request operating room: EXAM UNDER ANESTHESIA (EUA), VAGINAL CUFF REVISION, CYSTOSCOPY; Standing    Other hydronephrosis  Nuclear medicine perfusion flow scan with no evidence of obstruction.  Patient has had documented recurrent UTIs with culture most recently  greater than 100 K CFU's of E. coli.  Patient is receiving treatment with cephalexin.  Given plan for Examiner esthesia for coverage of physician, I will proceed with cystoscopy to rule out contributing causes.  Orders:  •  Case request operating room: EXAM UNDER ANESTHESIA (EUA), VAGINAL CUFF REVISION, CYSTOSCOPY; Standing            History of Present Illness   Reason for Visit / CC: Preoperative visit, follow-up for persistent vaginal cuff dehiscence   Beth Dorado is a 49 y.o. female   Patient presents for follow-up given persistent vaginal cuff dehiscence.  On January 9 she underwent exam under anesthesia, robotic type II radical hysterectomy, bilateral salpingo-oophorectomy, cystoscopy with right double-J ureteral stent insertion and 5 Sudanese open-ended ureteral catheter insertion which was removed at the end of the procedure.  Since her procedure, ureteral stent was eventually removed by urology.  Given some mild hydro on ultrasound she has had thorough evaluation with nuclear medicine perfusion/flow renal scan which demonstrated no evidence of obstruction.  She has also had at least 2-3 instances of urinary tract infection.  The latest for which she is receiving cephalexin for documented E. coli with greater than 100 K UFC's.  She has had a nonhealing vaginal cuff.  Presents today for follow-up.  She has maintained strict pelvic rest and use topical vaginal estrogen.  On exam today, her cuff is still open.  We will discuss definitive treatment by means of vaginal cuff revision.      Pertinent Medical History     Hypothyroidism, MTHFR, herniated cervical disc.    As per problem list.  Noncontributory.       Review of Systems   Genitourinary:  Positive for vaginal discharge.   All other systems reviewed and are negative.   A complete review of systems is negative other than that noted above in the HPI.  Past Medical History   Past Medical History:   Diagnosis Date   • Abnormal Pap smear of cervix 2005     2011-wnl, 2021-wnl, HRHPV neg   • Anesthesia     Pt reports her mom and sister have a history of being able to wake them up after surgery   • Chronic fatigue    • Chronic pain disorder     cervical spine pain   • Disease of thyroid gland     Hashimotos   • Fibromyalgia    • Hashimoto's thyroiditis    • Hiatal hernia    • History of transfusion    • Iron deficiency anemia    • Kidney stone    • Miscarriage    • MTHFR gene mutation    • Polycystic ovary syndrome      Past Surgical History:   Procedure Laterality Date   •  SECTION      X's 2; second one associated with uterine rupture   • CYSTOSCOPY N/A 2025    Procedure: CYSTOSCOPY WITH RIGHT DOUBLE J (6 Fr X 24 CM) URETERAL STENT INSERTION AND 5 FR OPEN ENDED URETERAL CATHETER INSERTION (REMOVED AT THE END OF THE PROCEDURE);  Surgeon: Edin Poon MD;  Location: [facility];  Service: Gynecology Oncology   • DILATION AND CURETTAGE OF UTERUS     • DILATION AND CURETTAGE, DIAGNOSTIC / THERAPEUTIC      missed    • FL CYSTOGRAM  2025   • MT LAPS TOTAL HYSTERECT 250 GM/< W/RMVL TUBE/OVARY N/A 2025    Procedure: EXAM UNDER ANESTHESIA,  ROBOTIC TYPE II RADICAL HYSTERECTOMY, BILATERAL SALPINGO-OOPHORECTOMY;  Surgeon: Edin Poon MD;  Location: [facility];  Service: Gynecology Oncology   • WISDOM TOOTH EXTRACTION     • WRIST SURGERY Right     exploratory     Family History   Problem Relation Age of Onset   • Breast cancer Mother          age 63   • Bone cancer Mother         from breast mets   • Thyroid disease Mother    • Heart failure Father    • No Known Problems Sister    • No Known Problems Brother    • Uterine cancer Maternal Grandmother    • No Known Problems Maternal Grandfather    • Heart disease Paternal Grandfather    • Ovarian cancer Neg Hx    • Colon cancer Neg Hx       reports that she has never smoked. She has never used smokeless tobacco. She reports that she does not currently use  "alcohol. She reports that she does not use drugs.  Current Outpatient Medications   Medication Instructions   • cephalexin (KEFLEX) 500 mg, Oral, Every 6 hours scheduled   • estrogens, conjugated (Premarin) vaginal cream Insert 1g nightly into the vagina daily for six weeks, then 1-2 times per week   • NON FORMULARY Estrogen Skin Cream for menopausal symptoms   • NP Thyroid 30 MG tablet 90 mg 3 times a week and 60 mg 4 times a week ( alternate)   • ondansetron (ZOFRAN-ODT) 4 mg, Oral, Every 6 hours PRN   • rimegepant sulfate (NURTEC) 75 mg, As needed     Allergies   Allergen Reactions   • Sulfamethoxazole GI Intolerance   • Trimethoprim GI Intolerance   • Nitrofurantoin GI Intolerance and Nausea Only   • Penicillins Hallucinations and Other (See Comments)     hallucinations, delusions, vomiting           Objective   /80 (BP Location: Left arm, Patient Position: Sitting, Cuff Size: Standard)   Pulse 81   Temp 98.2 °F (36.8 °C) (Temporal)   Resp 16   Ht 5' 5\" (1.651 m)   Wt 54.9 kg (121 lb)   LMP  (LMP Unknown)   SpO2 99%   BMI 20.14 kg/m²     Body mass index is 20.14 kg/m².  Pain Screening:  Pain Score: 0-No pain    Physical Exam  Vitals reviewed.   Constitutional:       Appearance: Normal appearance.   Cardiovascular:      Rate and Rhythm: Normal rate and regular rhythm.   Pulmonary:      Effort: Pulmonary effort is normal. No respiratory distress.      Breath sounds: No stridor.   Abdominal:      General: There is no distension.      Palpations: Abdomen is soft. There is no mass.      Tenderness: There is no abdominal tenderness. There is no guarding.      Hernia: No hernia is present.   Genitourinary:     Comments: Normal external genitalia.  Vulva and vagina with no lesions.  Scant whitish vaginal discharge, not foul-smelling.  The vaginal cuff is open and well organized with no prolapsing viscera consistent with chronic dehiscence.  Bimanual exam is confirmatory.  Musculoskeletal:      Right lower " leg: No edema.      Left lower leg: No edema.   Neurological:      Mental Status: She is alert.          Edin Poon MD, JOSIANE, FACOG, FACS  3/25/2025  9:13 AM

## 2025-03-25 NOTE — ASSESSMENT & PLAN NOTE
I have independently obtained history from patient today.  Pelvic exam performed.    Supplied extended conservative management with strict pelvic rest and topical vaginal estrogen, cuff dehiscence is still persistent.  After counseling, have recommended and she has consented to proceed to the operating room for exam under anesthesia, vaginal cuff revision, cystoscopy.    Patient is healthy with good exercise tolerance.  No additional cardiovascular workup is indicated.  No specific preoperative testing or intervention needed.  All questions answered.    The patient was counseled regarding indications, risks, benefits and alternatives to surgical management.  We discussed risks including but not limited to bleeding and potential need for blood transfusions, infection, pain, injury to surrounding organs and neurovascular structures.  Patient understands potential risks associated with stress of surgery and general anesthesia including but not limited to acute cardiac events, venous thromboembolism, etc.  Patient consents for blood transfusions if those are deemed necessary during the course of care.  She understands risks include but are not limited to hypersensitivity reactions and infection with blood-borne pathogens.  Patient verbalizes understanding, agrees and wants to proceed.  Informed consent form signed. All questions answered to patient's satisfaction.          Orders:  •  Case request operating room: EXAM UNDER ANESTHESIA (EUA), VAGINAL CUFF REVISION, CYSTOSCOPY; Standing

## 2025-03-25 NOTE — ASSESSMENT & PLAN NOTE
Nuclear medicine perfusion flow scan with no evidence of obstruction.  Patient has had documented recurrent UTIs with culture most recently greater than 100 K CFU's of E. coli.  Patient is receiving treatment with cephalexin.  Given plan for Examiner esthesia for coverage of physician, I will proceed with cystoscopy to rule out contributing causes.  Orders:  •  Case request operating room: EXAM UNDER ANESTHESIA (EUA), VAGINAL CUFF REVISION, CYSTOSCOPY; Standing

## 2025-03-25 NOTE — H&P (VIEW-ONLY)
Name: Beth Dorado      : 1975      MRN: [id number]  Encounter Provider: Edin Poon MD  Encounter Date: 3/25/2025   Encounter department: [facility] [place]  :  Assessment & Plan  Dehiscence of vaginal cuff, initial encounter  I have independently obtained history from patient today.  Pelvic exam performed.    Supplied extended conservative management with strict pelvic rest and topical vaginal estrogen, cuff dehiscence is still persistent.  After counseling, have recommended and she has consented to proceed to the operating room for exam under anesthesia, vaginal cuff revision, cystoscopy.    Patient is healthy with good exercise tolerance.  No additional cardiovascular workup is indicated.  No specific preoperative testing or intervention needed.  All questions answered.    The patient was counseled regarding indications, risks, benefits and alternatives to surgical management.  We discussed risks including but not limited to bleeding and potential need for blood transfusions, infection, pain, injury to surrounding organs and neurovascular structures.  Patient understands potential risks associated with stress of surgery and general anesthesia including but not limited to acute cardiac events, venous thromboembolism, etc.  Patient consents for blood transfusions if those are deemed necessary during the course of care.  She understands risks include but are not limited to hypersensitivity reactions and infection with blood-borne pathogens.  Patient verbalizes understanding, agrees and wants to proceed.  Informed consent form signed. All questions answered to patient's satisfaction.          Orders:  •  Case request operating room: EXAM UNDER ANESTHESIA (EUA), VAGINAL CUFF REVISION, CYSTOSCOPY; Standing    Other hydronephrosis  Nuclear medicine perfusion flow scan with no evidence of obstruction.  Patient has had documented recurrent UTIs with culture most recently  greater than 100 K CFU's of E. coli.  Patient is receiving treatment with cephalexin.  Given plan for Examiner esthesia for coverage of physician, I will proceed with cystoscopy to rule out contributing causes.  Orders:  •  Case request operating room: EXAM UNDER ANESTHESIA (EUA), VAGINAL CUFF REVISION, CYSTOSCOPY; Standing            History of Present Illness   Reason for Visit / CC: Preoperative visit, follow-up for persistent vaginal cuff dehiscence   Beth Dorado is a 49 y.o. female   Patient presents for follow-up given persistent vaginal cuff dehiscence.  On January 9 she underwent exam under anesthesia, robotic type II radical hysterectomy, bilateral salpingo-oophorectomy, cystoscopy with right double-J ureteral stent insertion and 5 [place] open-ended ureteral catheter insertion which was removed at the end of the procedure.  Since her procedure, ureteral stent was eventually removed by urology.  Given some mild hydro on ultrasound she has had thorough evaluation with nuclear medicine perfusion/flow renal scan which demonstrated no evidence of obstruction.  She has also had at least 2-3 instances of urinary tract infection.  The latest for which she is receiving cephalexin for documented E. coli with greater than 100 K UFC's.  She has had a nonhealing vaginal cuff.  Presents today for follow-up.  She has maintained strict pelvic rest and use topical vaginal estrogen.  On exam today, her cuff is still open.  We will discuss definitive treatment by means of vaginal cuff revision.      Pertinent Medical History     Hypothyroidism, MTHFR, herniated cervical disc.    As per problem list.  Noncontributory.       Review of Systems   Genitourinary:  Positive for vaginal discharge.   All other systems reviewed and are negative.   A complete review of systems is negative other than that noted above in the HPI.  Past Medical History   Past Medical History:   Diagnosis Date   • Abnormal Pap smear of cervix 2005     2011-wnl, 2021-wnl, HRHPV neg   • Anesthesia     Pt reports her mom and sister have a history of being able to wake them up after surgery   • Chronic fatigue    • Chronic pain disorder     cervical spine pain   • Disease of thyroid gland     Hashimotos   • Fibromyalgia    • Hashimoto's thyroiditis    • Hiatal hernia    • History of transfusion    • Iron deficiency anemia    • Kidney stone    • Miscarriage    • MTHFR gene mutation    • Polycystic ovary syndrome      Past Surgical History:   Procedure Laterality Date   •  SECTION      X's 2; second one associated with uterine rupture   • CYSTOSCOPY N/A 2025    Procedure: CYSTOSCOPY WITH RIGHT DOUBLE J (6 Fr X 24 CM) URETERAL STENT INSERTION AND 5 FR OPEN ENDED URETERAL CATHETER INSERTION (REMOVED AT THE END OF THE PROCEDURE);  Surgeon: Edin Poon MD;  Location: [facility];  Service: Gynecology Oncology   • DILATION AND CURETTAGE OF UTERUS     • DILATION AND CURETTAGE, DIAGNOSTIC / THERAPEUTIC      missed    • FL CYSTOGRAM  2025   • MN LAPS TOTAL HYSTERECT 250 GM/< W/RMVL TUBE/OVARY N/A 2025    Procedure: EXAM UNDER ANESTHESIA,  ROBOTIC TYPE II RADICAL HYSTERECTOMY, BILATERAL SALPINGO-OOPHORECTOMY;  Surgeon: Edin Poon MD;  Location: [facility];  Service: Gynecology Oncology   • WISDOM TOOTH EXTRACTION     • WRIST SURGERY Right     exploratory     Family History   Problem Relation Age of Onset   • Breast cancer Mother          age 63   • Bone cancer Mother         from breast mets   • Thyroid disease Mother    • Heart failure Father    • No Known Problems Sister    • No Known Problems Brother    • Uterine cancer Maternal Grandmother    • No Known Problems Maternal Grandfather    • Heart disease Paternal Grandfather    • Ovarian cancer Neg Hx    • Colon cancer Neg Hx       reports that she has never smoked. She has never used smokeless tobacco. She reports that she does not currently use  "alcohol. She reports that she does not use drugs.  Current Outpatient Medications   Medication Instructions   • cephalexin (KEFLEX) 500 mg, Oral, Every 6 hours scheduled   • estrogens, conjugated (Premarin) vaginal cream Insert 1g nightly into the vagina daily for six weeks, then 1-2 times per week   • NON FORMULARY Estrogen Skin Cream for menopausal symptoms   • NP Thyroid 30 MG tablet 90 mg 3 times a week and 60 mg 4 times a week ( alternate)   • ondansetron (ZOFRAN-ODT) 4 mg, Oral, Every 6 hours PRN   • rimegepant sulfate (NURTEC) 75 mg, As needed     Allergies   Allergen Reactions   • Sulfamethoxazole GI Intolerance   • Trimethoprim GI Intolerance   • Nitrofurantoin GI Intolerance and Nausea Only   • Penicillins Hallucinations and Other (See Comments)     hallucinations, delusions, vomiting           Objective   /80 (BP Location: Left arm, Patient Position: Sitting, Cuff Size: Standard)   Pulse 81   Temp 98.2 °F (36.8 °C) (Temporal)   Resp 16   Ht 5' 5\" (1.651 m)   Wt 54.9 kg (121 lb)   LMP  (LMP Unknown)   SpO2 99%   BMI 20.14 kg/m²     Body mass index is 20.14 kg/m².  Pain Screening:  Pain Score: 0-No pain    Physical Exam  Vitals reviewed.   Constitutional:       Appearance: Normal appearance.   Cardiovascular:      Rate and Rhythm: Normal rate and regular rhythm.   Pulmonary:      Effort: Pulmonary effort is normal. No respiratory distress.      Breath sounds: No stridor.   Abdominal:      General: There is no distension.      Palpations: Abdomen is soft. There is no mass.      Tenderness: There is no abdominal tenderness. There is no guarding.      Hernia: No hernia is present.   Genitourinary:     Comments: Normal external genitalia.  Vulva and vagina with no lesions.  Scant whitish vaginal discharge, not foul-smelling.  The vaginal cuff is open and well organized with no prolapsing viscera consistent with chronic dehiscence.  Bimanual exam is confirmatory.  Musculoskeletal:      Right lower " leg: No edema.      Left lower leg: No edema.   Neurological:      Mental Status: She is alert.          Edin Poon MD, JOSIANE, FACOG, FACS  3/25/2025  9:13 AM

## 2025-03-25 NOTE — TELEPHONE ENCOUNTER
Phoned patient to discuss surgery scheduling, surgery scheduled for 04/02/2025 at [facility].  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  Pre Op and Post op instructions reviewed.  Post op appointment scheduled.   All questions/concerns addressed.  Provided patient with call back number for any questions/concerns.

## 2025-03-28 NOTE — PRE-PROCEDURE INSTRUCTIONS
Pre-Surgery Instructions:   Medication Instructions    NON FORMULARY Hold day of surgery.    NP Thyroid 30 MG tablet Take day of surgery.    rimegepant sulfate (NURTEC) 75 mg TBDP Uses PRN- OK to take day of surgery    Medication instructions for day of surgery reviewed. Please take all instructed medications with only a sip of water.       You will receive a call one business day prior to surgery with an arrival time and hospital directions. If your surgery is scheduled on a Monday, the hospital will be calling you on the Friday prior to your surgery. If you have not heard from anyone by 8pm, please call the hospital supervisor through the hospital  at [phone]. ([place] [phone] or [place] [phone]).    Do not eat or drink anything after midnight the night before your surgery, including candy, mints, lifesavers, or chewing gum. Do not drink alcohol 24hrs before your surgery. Try not to smoke at least 24hrs before your surgery.       Follow the pre surgery showering instructions as listed in the “My Surgical Experience Booklet” or otherwise provided by your surgeon's office. Do not use a blade to shave the surgical area 1 week before surgery. It is okay to use a clean electric clippers up to 24 hours before surgery. Do not apply any lotions, creams, including makeup, cologne, deodorant, or perfumes after showering on the day of your surgery. Do not use dry shampoo, hair spray, hair gel, or any type of hair products.     No contact lenses, eye make-up, or artificial eyelashes. Remove nail polish, including gel polish, and any artificial, gel, or acrylic nails if possible. Remove all jewelry including rings and body piercing jewelry.     Wear causal clothing that is easy to take on and off. Consider your type of surgery.    Keep any valuables, jewelry, piercings at home. Please bring any specially ordered equipment (sling, braces) if indicated.    Arrange for a responsible person to drive you to  and from the hospital on the day of your surgery. Please confirm the visitor policy for the day of your procedure when you receive your phone call with an arrival time.     Call the surgeon's office with any new illnesses, exposures, or additional questions prior to surgery.    Please reference your “My Surgical Experience Booklet” for additional information to prepare for your upcoming surgery.

## 2025-04-01 ENCOUNTER — ANESTHESIA EVENT (OUTPATIENT)
Dept: PERIOP | Facility: HOSPITAL | Age: 50
End: 2025-04-01
Payer: COMMERCIAL

## 2025-04-02 ENCOUNTER — HOSPITAL ENCOUNTER (OUTPATIENT)
Facility: HOSPITAL | Age: 50
Setting detail: OUTPATIENT SURGERY
Discharge: HOME | End: 2025-04-02
Attending: OBSTETRICS & GYNECOLOGY | Admitting: OBSTETRICS & GYNECOLOGY
Payer: COMMERCIAL

## 2025-04-02 ENCOUNTER — ANESTHESIA (OUTPATIENT)
Dept: PERIOP | Facility: HOSPITAL | Age: 50
End: 2025-04-02
Payer: COMMERCIAL

## 2025-04-02 VITALS
TEMPERATURE: 98.6 F | BODY MASS INDEX: 20.64 KG/M2 | OXYGEN SATURATION: 99 % | WEIGHT: 123.9 LBS | DIASTOLIC BLOOD PRESSURE: 64 MMHG | RESPIRATION RATE: 16 BRPM | SYSTOLIC BLOOD PRESSURE: 126 MMHG | HEIGHT: 65 IN | HEART RATE: 81 BPM

## 2025-04-02 PROCEDURE — 12020 TX SUPFC WND DEHSN SMPL CLSR: CPT | Performed by: OBSTETRICS & GYNECOLOGY

## 2025-04-02 RX ORDER — CEFAZOLIN SODIUM 2 G/50ML
2000 SOLUTION INTRAVENOUS ONCE
Status: COMPLETED | OUTPATIENT
Start: 2025-04-02 | End: 2025-04-02

## 2025-04-02 RX ORDER — PROMETHAZINE HYDROCHLORIDE 25 MG/ML
12.5 INJECTION, SOLUTION INTRAMUSCULAR; INTRAVENOUS ONCE AS NEEDED
Status: DISCONTINUED | OUTPATIENT
Start: 2025-04-02 | End: 2025-04-02 | Stop reason: HOSPADM

## 2025-04-02 RX ORDER — ACETAMINOPHEN 325 MG/1
975 TABLET ORAL EVERY 6 HOURS PRN
Status: DISCONTINUED | OUTPATIENT
Start: 2025-04-02 | End: 2025-04-02 | Stop reason: HOSPADM

## 2025-04-02 RX ORDER — OXYCODONE HYDROCHLORIDE 10 MG/1
10 TABLET ORAL EVERY 4 HOURS PRN
Status: DISCONTINUED | OUTPATIENT
Start: 2025-04-02 | End: 2025-04-02 | Stop reason: HOSPADM

## 2025-04-02 RX ORDER — KETOROLAC TROMETHAMINE 30 MG/ML
INJECTION, SOLUTION INTRAMUSCULAR; INTRAVENOUS AS NEEDED
Status: DISCONTINUED | OUTPATIENT
Start: 2025-04-02 | End: 2025-04-02

## 2025-04-02 RX ORDER — FENTANYL CITRATE/PF 50 MCG/ML
25 SYRINGE (ML) INJECTION
Status: DISCONTINUED | OUTPATIENT
Start: 2025-04-02 | End: 2025-04-02 | Stop reason: HOSPADM

## 2025-04-02 RX ORDER — ALBUTEROL SULFATE 0.83 MG/ML
2.5 SOLUTION RESPIRATORY (INHALATION) ONCE AS NEEDED
Status: DISCONTINUED | OUTPATIENT
Start: 2025-04-02 | End: 2025-04-02 | Stop reason: HOSPADM

## 2025-04-02 RX ORDER — MIDAZOLAM HYDROCHLORIDE 2 MG/2ML
INJECTION, SOLUTION INTRAMUSCULAR; INTRAVENOUS AS NEEDED
Status: DISCONTINUED | OUTPATIENT
Start: 2025-04-02 | End: 2025-04-02

## 2025-04-02 RX ORDER — ONDANSETRON 2 MG/ML
INJECTION INTRAMUSCULAR; INTRAVENOUS AS NEEDED
Status: DISCONTINUED | OUTPATIENT
Start: 2025-04-02 | End: 2025-04-02

## 2025-04-02 RX ORDER — ONDANSETRON 2 MG/ML
4 INJECTION INTRAMUSCULAR; INTRAVENOUS ONCE AS NEEDED
Status: DISCONTINUED | OUTPATIENT
Start: 2025-04-02 | End: 2025-04-02 | Stop reason: HOSPADM

## 2025-04-02 RX ORDER — MAGNESIUM HYDROXIDE 1200 MG/15ML
LIQUID ORAL AS NEEDED
Status: DISCONTINUED | OUTPATIENT
Start: 2025-04-02 | End: 2025-04-02 | Stop reason: HOSPADM

## 2025-04-02 RX ORDER — FENTANYL CITRATE 50 UG/ML
INJECTION, SOLUTION INTRAMUSCULAR; INTRAVENOUS AS NEEDED
Status: DISCONTINUED | OUTPATIENT
Start: 2025-04-02 | End: 2025-04-02

## 2025-04-02 RX ORDER — EPHEDRINE SULFATE 50 MG/ML
INJECTION INTRAVENOUS AS NEEDED
Status: DISCONTINUED | OUTPATIENT
Start: 2025-04-02 | End: 2025-04-02

## 2025-04-02 RX ORDER — PROPOFOL 10 MG/ML
INJECTION, EMULSION INTRAVENOUS AS NEEDED
Status: DISCONTINUED | OUTPATIENT
Start: 2025-04-02 | End: 2025-04-02

## 2025-04-02 RX ORDER — DEXAMETHASONE SODIUM PHOSPHATE 10 MG/ML
INJECTION, SOLUTION INTRAMUSCULAR; INTRAVENOUS AS NEEDED
Status: DISCONTINUED | OUTPATIENT
Start: 2025-04-02 | End: 2025-04-02

## 2025-04-02 RX ORDER — SODIUM CHLORIDE, SODIUM LACTATE, POTASSIUM CHLORIDE, CALCIUM CHLORIDE 600; 310; 30; 20 MG/100ML; MG/100ML; MG/100ML; MG/100ML
125 INJECTION, SOLUTION INTRAVENOUS CONTINUOUS
Status: DISCONTINUED | OUTPATIENT
Start: 2025-04-02 | End: 2025-04-02 | Stop reason: HOSPADM

## 2025-04-02 RX ORDER — HYDROMORPHONE HCL/PF 1 MG/ML
0.5 SYRINGE (ML) INJECTION
Status: DISCONTINUED | OUTPATIENT
Start: 2025-04-02 | End: 2025-04-02 | Stop reason: HOSPADM

## 2025-04-02 RX ORDER — OXYCODONE HYDROCHLORIDE 5 MG/1
5 TABLET ORAL EVERY 4 HOURS PRN
Status: DISCONTINUED | OUTPATIENT
Start: 2025-04-02 | End: 2025-04-02 | Stop reason: HOSPADM

## 2025-04-02 RX ORDER — IBUPROFEN 600 MG/1
600 TABLET, FILM COATED ORAL EVERY 6 HOURS PRN
Status: DISCONTINUED | OUTPATIENT
Start: 2025-04-02 | End: 2025-04-02 | Stop reason: HOSPADM

## 2025-04-02 RX ORDER — LIDOCAINE HYDROCHLORIDE 20 MG/ML
INJECTION, SOLUTION EPIDURAL; INFILTRATION; INTRACAUDAL; PERINEURAL AS NEEDED
Status: DISCONTINUED | OUTPATIENT
Start: 2025-04-02 | End: 2025-04-02

## 2025-04-02 RX ADMIN — FENTANYL CITRATE 25 MCG: 50 INJECTION INTRAMUSCULAR; INTRAVENOUS at 09:57

## 2025-04-02 RX ADMIN — FENTANYL CITRATE 25 MCG: 50 INJECTION INTRAMUSCULAR; INTRAVENOUS at 10:10

## 2025-04-02 RX ADMIN — LIDOCAINE HYDROCHLORIDE 60 MG: 20 INJECTION, SOLUTION EPIDURAL; INFILTRATION; INTRACAUDAL at 09:05

## 2025-04-02 RX ADMIN — SODIUM CHLORIDE, SODIUM LACTATE, POTASSIUM CHLORIDE, AND CALCIUM CHLORIDE 125 ML/HR: .6; .31; .03; .02 INJECTION, SOLUTION INTRAVENOUS at 08:31

## 2025-04-02 RX ADMIN — KETOROLAC TROMETHAMINE 15 MG: 30 INJECTION, SOLUTION INTRAMUSCULAR; INTRAVENOUS at 09:38

## 2025-04-02 RX ADMIN — DEXAMETHASONE SODIUM PHOSPHATE 10 MG: 10 INJECTION, SOLUTION INTRAMUSCULAR; INTRAVENOUS at 09:11

## 2025-04-02 RX ADMIN — FENTANYL CITRATE 25 MCG: 50 INJECTION INTRAMUSCULAR; INTRAVENOUS at 09:20

## 2025-04-02 RX ADMIN — CEFAZOLIN SODIUM 2000 MG: 2 SOLUTION INTRAVENOUS at 08:59

## 2025-04-02 RX ADMIN — ONDANSETRON 4 MG: 2 INJECTION INTRAMUSCULAR; INTRAVENOUS at 08:59

## 2025-04-02 RX ADMIN — PROPOFOL 200 MG: 10 INJECTION, EMULSION INTRAVENOUS at 09:05

## 2025-04-02 RX ADMIN — MIDAZOLAM HYDROCHLORIDE 2 MG: 1 INJECTION, SOLUTION INTRAMUSCULAR; INTRAVENOUS at 08:59

## 2025-04-02 RX ADMIN — EPHEDRINE SULFATE 5 MG: 50 INJECTION INTRAVENOUS at 09:19

## 2025-04-02 NOTE — ANESTHESIA POSTPROCEDURE EVALUATION
Post-Op Assessment Note    CV Status:  Stable    Pain management: adequate       Mental Status:  Alert and awake   Hydration Status:  Euvolemic   PONV Controlled:  Controlled   Airway Patency:  Patent     Post Op Vitals Reviewed: Yes    No anethesia notable event occurred.    Staff: Anesthesiologist           Last Filed PACU Vitals:  Vitals Value Taken Time   Temp 98 °F (36.7 °C) 04/02/25 1015   Pulse 82 04/02/25 1020   /68 04/02/25 1015   Resp 15 04/02/25 1015   SpO2 100 % 04/02/25 1020   Vitals shown include unfiled device data.    Modified Nuris:     Vitals Value Taken Time   Activity 2 04/02/25 0945   Respiration 2 04/02/25 0945   Circulation 2 04/02/25 0945   Consciousness 1 04/02/25 0945   Oxygen Saturation 2 04/02/25 0945     Modified Nuris Score: 9

## 2025-04-02 NOTE — OP NOTE
OPERATIVE REPORT  PATIENT NAME: Beth Dorado    :  1975  MRN: [id number]  Pt Location: [facility] 05    SURGERY DATE: 2025    Surgeons and Role:     * Edin Poon MD - Primary     * April Dao MD - Assisting    Preop Diagnosis:  Dehiscence of vaginal cuff, initial encounter [T81.328A]  Other hydronephrosis [N13.39]    Post-Op Diagnosis Codes:     * Dehiscence of vaginal cuff, initial encounter [T81.328A]     * Other hydronephrosis [N13.39]    Procedure(s):  VAGINAL CUFF REVISION. EXAM UNDER ANESTHESIA (EUA)  CYSTOSCOPY    Specimen(s):  * No specimens in log *    Estimated Blood Loss:   Minimal    Drains:  Urethral Catheter Non-latex 16 Fr. (Active)   Number of days: 83       Anesthesia Type:   General    Operative Indications:  Patient with nonhealing vaginal cuff after hysterectomy despite extended conservative management and topical vaginal estrogen.  In addition, she has had recurrent urinary tract infections and nonobstructive hydro.  After counseling, she consented to proceed to the operating room for vaginal cuff revision and cystoscopy.    Operative Findings:  #1.  Cystoscopy demonstrated normal bladder mucosa, no lesions or abnormalities.  Bilateral orthotopic ureteral orifices identified and bilateral potent ureteral jets noted.  #2.  Vaginal cuff with approximately 2 x 3 cm opening with organized inflammatory process.  No evidence of vaginal evisceration.  Epithelial edges refreshed and vaginal cuff reclosed with interrupted stitches of #1 Vicryl.      Complications:   None    Procedure and Technique:  The patient was taken to the operative room her general anesthesia via LMA was administered without complications.  Antibiotics were administered per hospital protocol.  SCDs applied to lower extremities and activated prior to induction of anesthesia.  The patient was placed in the dorsolithotomy position in [place] stirrups and her perineum and vagina were prepped and draped in the  usual fashion.    Using a 22 Portuguese sheath and a 30 degree cystoscope the bladder was examined using normal saline as distention media.  Above-mentioned findings were noted.  At the completion of cystoscopy the bladder was drained.    Then, under direct realization the edges of the vaginal cuff were grasped with Allis clamps and excised using Metzenbaum scissors.  Then, the angles were first secured with stitches of #1 Vicryl in the intervening midportion of vagina closed with figure-of-eight stitches of #1 Vicryl.  Excellent closure and hemostasis was noted.  Vagina was irrigated with saline and the procedure was completed.    Sponge, needle and instrument counts were prescribed x 2.  The patient was successfully awakened in the operating room and transferred to the postanesthetic care unit in stable condition.     I was present for the entire procedure.    Patient Disposition:  PACU     Edin Poon MD, JOSIANE, FACOG, FACS  4/2/2025  9:51 AM

## 2025-04-02 NOTE — DISCHARGE INSTR - AVS FIRST PAGE
[facility] - Gynecologic Oncology  Ayah Sandoval, Jason Leonard and Abdulkadir   [phone]         Discharge Instructions    Exam under anesthesia, cystoscopy and vaginal cuff revision        DISCHARGE INSTRUCTIONS:   Today we placed a camera inside the bladder and noted no abnormalities were injuries.  Both ureters demonstrated both and jets which indicates no evidence of proximal obstruction.  There were no findings to explain recurrent urinary infections.    We then proceeded to reclose the vaginal cuff.  To this end, we refreshed the edges to make sure viable fresh tissues where close together and an optimal closure could be performed without difficulty.    What to expect at home:   Recovery from surgery is generally 2-4 weeks, but sometimes longer for more strenuous activity. It is normal to be very tired during this time.  Pain from this procedure should be minimal.  If you had laparoscopic/robotic surgery, you may experience gas pain, abdominal swelling, or shoulder pain for 24-72 hours after surgery. This is from the carbon dioxide gas put into your abdomen to better visualize your organs. A warm shower, heating pad, and/or walking may help.    Contact your doctor at the number above if:   You have a fever over 100.4o.  You have nausea or vomiting that does not improve after a light meal.  Your abdominal or pelvic pain gets worse, even after you take medicine.  You feel pain or burning when you urinate, or you have trouble urinating that worsens over time. Some burning in the first 1-2 days after surgery is expected after removal of the bladder catheter during surgery.  You have pus or a foul-smelling odor coming from your vagina.  Your arm or leg feels warm, tender, red, swollen and/or painful.   You have heavy vaginal bleeding that fills 1 or more sanitary pads in 1 hour. It is normal to have a small amount of vaginal bleeding or discharge after surgery that would  require the use of a light pantiliner. This discharge may last up to 6 weeks. The bleeding and discharge should be light and should have no odor.     CALL 911 OR GO TO THE EMERGENCY ROOM IF YOU HAVE: Any shortness of breath, difficulty breathing, or chest pain.    Pain  Pain after surgery is a challenging part of the healing process. Pain may be present for weeks but should gradually get better.  Fortunately, pain after the surgery should be minimal.  Please take extra strength acetaminophen (Tylenol) 1000 mg every 6 hours and then alternate with a NSAIDs such as ibuprofen (Advil, Motrin) 600 mg (3 tablets) every 6 hours to help decrease swelling, pain, and fever.   NSAIDs can cause stomach bleeding or kidney problems in certain people. If you take blood thinner medicine, always ask your healthcare provider if NSAIDs are safe for you. Always read the medicine label and follow directions.  The maximum dose of Tylenol is 4000 mg in 24 hours. The maximum dose of Advil/Motrin is 2400mg in 24 hours.   For moderate to severe pain, please take oxycodone 5 mg PO every 4-6 hours as needed or other narcotic that was prescribed by provider.     Constipation  Constipation is common and it is normal to not have a bowel movement for up to one week after surgery. You should still be passing gas despite constipation and should be able to tolerate both liquid and solid food without nausea or vomiting.  Please take polyethylene glycol (Miralax) 17 g (1 measured capful or 1 packet) once a day. If you have not had a bowel movement 3 days after surgery, you may take the Miralax two times a day. The alternatives to Miralax include Senna and Colace.     If you have any discomfort because of the need to have a bowel movement, you may add milk of magnesia or magnesium citrate (available at your local pharmacy without a prescription) at any time. Do not take milk of magnesia or magnesium citrate if you have kidney failure.   If you have  loose or watery stools, stop taking the medications.     Take your medicine as directed. Contact your healthcare provider if you think your medicine is not helping or if you have side effects. Tell him or her if you are allergic to any medicine. Keep a list of the medicines, vitamins, and herbs you take. Include the amounts, and when and why you take them. Bring the list of the pill bottles to follow-up visits. Carry your medicine list with you in case of an emergency.    Activity:   Rest as needed. Get up and move around as directed to help prevent blood clots. Start with short walks and slowly increase the distance every day.     Stairs are okay to use. Use two feet on each stair to go up and down during the first several days to weeks after surgery.     You may shower as soon as you return home. You can use soapy water surrounding the incision and let the water run over it. Pat the incision dry after your shower (see wound care section below)     Do not strain during bowel movements. High-fiber foods and extra liquids can help you prevent constipation. Examples of high-fiber foods are fruit and bran. Prune juice and water are good liquids to drink.      Do not have sex, use tampons, or douche for up to 8 weeks.   Do not go into pools or hot tubs for up to 8 weeks and/or until you have been cleared by your doctor.      It is generally safe to drive if you feel strong enough and your reaction time is not compromised and when no longer taking prescription/opioid pain medication    Ask when you may return to work and to other regular activities.    Deep breathing:   Take deep breaths and cough 10 times each hour. This will decrease your risk of a lung infection as this will open your airway. Take a deep breath and hold it for as long as you can. Let the air out and then cough strongly.     You may be given an incentive spirometer to help you take deep breaths. Put the plastic piece in your mouth and take a slow, deep  breath, then let the air out and cough.

## 2025-04-02 NOTE — ANESTHESIA PREPROCEDURE EVALUATION
Procedure:  VAGINAL CUFF REVISION, EXAM UNDER ANESTHESIA (EUA) (Vagina )  CYSTOSCOPY (Bladder)    Relevant Problems   ANESTHESIA (within normal limits)      CARDIO (within normal limits)      ENDO   (+) Hypothyroidism due to Hashimoto's thyroiditis      /RENAL   (+) Other hydronephrosis      PULMONARY (within normal limits)      Endocrine   (+) MTHFR mutation      Surgery/Wound/Pain   (+) S/P hysterectomy with oophorectomy (Resolved)        Physical Exam    Airway    Mallampati score: I  TM Distance: >3 FB  Neck ROM: full     Dental   No notable dental hx     Cardiovascular  Cardiovascular exam normal    Pulmonary  Pulmonary exam normal     Other Findings  post-pubertal.      Anesthesia Plan  ASA Score- 2     Anesthesia Type- general with ASA Monitors.         Additional Monitors:     Airway Plan:     Comment: TIVA vs LMA  Avoid nitrous oxide.       Plan Factors-    Chart reviewed.    Patient summary reviewed.                  Induction- intravenous.    Postoperative Plan-         Informed Consent- Anesthetic plan and risks discussed with patient.        NPO Status:  Vitals Value Taken Time   Date of last liquid 04/02/25 04/02/25 0800   Time of last liquid 0530 04/02/25 0800   Date of last solid 04/01/25 04/02/25 0800   Time of last solid 2200 04/02/25 0800

## 2025-04-02 NOTE — INTERVAL H&P NOTE
H&P reviewed. After examining the patient I find no changes in the patients condition since the H&P was written.    Vitals:    04/02/25 0825   BP: 122/68   Pulse: 75   Resp: 16   Temp: 98.7 °F (37.1 °C)   SpO2: 100%     Agrees to proceed to OR as planned. All questions answered.    Edin Poon MD, JOSIANE, FACOG, FACS  4/2/2025  8:35 AM

## 2025-04-02 NOTE — ANESTHESIA POSTPROCEDURE EVALUATION
Post-Op Assessment Note    CV Status:  Stable  Pain Score: 0    Pain management: adequate       Mental Status:  Alert and awake   Hydration Status:  Euvolemic   PONV Controlled:  Controlled   Airway Patency:  Patent     Post Op Vitals Reviewed: Yes    No anethesia notable event occurred.    Staff: CRNA           Last Filed PACU Vitals:  Vitals Value Taken Time   Temp 97.6 °F (36.4 °C) 04/02/25 0945   Pulse 94 04/02/25 0948   /69 04/02/25 0945   Resp 16 04/02/25 0945   SpO2 100 % 04/02/25 0948   Vitals shown include unfiled device data.

## 2025-04-12 ENCOUNTER — APPOINTMENT (EMERGENCY)
Dept: RADIOLOGY | Facility: HOSPITAL | Age: 50
End: 2025-04-12
Payer: COMMERCIAL

## 2025-04-12 ENCOUNTER — HOSPITAL ENCOUNTER (EMERGENCY)
Facility: HOSPITAL | Age: 50
Discharge: HOME | End: 2025-04-12
Attending: EMERGENCY MEDICINE
Payer: COMMERCIAL

## 2025-04-12 VITALS
RESPIRATION RATE: 16 BRPM | BODY MASS INDEX: 20.4 KG/M2 | DIASTOLIC BLOOD PRESSURE: 63 MMHG | HEART RATE: 99 BPM | SYSTOLIC BLOOD PRESSURE: 143 MMHG | TEMPERATURE: 98.3 F | WEIGHT: 122.58 LBS | OXYGEN SATURATION: 99 %

## 2025-04-12 DIAGNOSIS — M25.511 RIGHT SHOULDER PAIN: Primary | ICD-10-CM

## 2025-04-12 PROCEDURE — 73030 X-RAY EXAM OF SHOULDER: CPT

## 2025-04-12 PROCEDURE — 99283 EMERGENCY DEPT VISIT LOW MDM: CPT

## 2025-04-12 PROCEDURE — 99284 EMERGENCY DEPT VISIT MOD MDM: CPT | Performed by: EMERGENCY MEDICINE

## 2025-04-12 PROCEDURE — 96372 THER/PROPH/DIAG INJ SC/IM: CPT

## 2025-04-12 PROCEDURE — 71046 X-RAY EXAM CHEST 2 VIEWS: CPT

## 2025-04-12 RX ORDER — METHOCARBAMOL 500 MG/1
500 TABLET, FILM COATED ORAL 2 TIMES DAILY PRN
Qty: 10 TABLET | Refills: 0 | Status: SHIPPED | OUTPATIENT
Start: 2025-04-12 | End: 2025-04-12

## 2025-04-12 RX ORDER — ACETAMINOPHEN 325 MG/1
975 TABLET ORAL ONCE
Status: COMPLETED | OUTPATIENT
Start: 2025-04-12 | End: 2025-04-12

## 2025-04-12 RX ORDER — KETOROLAC TROMETHAMINE 30 MG/ML
15 INJECTION, SOLUTION INTRAMUSCULAR; INTRAVENOUS ONCE
Status: COMPLETED | OUTPATIENT
Start: 2025-04-12 | End: 2025-04-12

## 2025-04-12 RX ORDER — DIAZEPAM 2 MG/1
2 TABLET ORAL ONCE
Status: COMPLETED | OUTPATIENT
Start: 2025-04-12 | End: 2025-04-12

## 2025-04-12 RX ORDER — LIDOCAINE 50 MG/G
1 PATCH TOPICAL DAILY
Qty: 9 PATCH | Refills: 0 | Status: SHIPPED | OUTPATIENT
Start: 2025-04-12 | End: 2025-04-21

## 2025-04-12 RX ORDER — KETOROLAC TROMETHAMINE 30 MG/ML
15 INJECTION, SOLUTION INTRAMUSCULAR; INTRAVENOUS ONCE
Status: DISCONTINUED | OUTPATIENT
Start: 2025-04-12 | End: 2025-04-12

## 2025-04-12 RX ORDER — METHOCARBAMOL 500 MG/1
500 TABLET, FILM COATED ORAL 2 TIMES DAILY PRN
Qty: 10 TABLET | Refills: 0 | Status: SHIPPED | OUTPATIENT
Start: 2025-04-12 | End: 2025-04-29

## 2025-04-12 RX ADMIN — KETOROLAC TROMETHAMINE 15 MG: 30 INJECTION, SOLUTION INTRAMUSCULAR; INTRAVENOUS at 11:26

## 2025-04-12 RX ADMIN — DIAZEPAM 2 MG: 2 TABLET ORAL at 11:25

## 2025-04-12 RX ADMIN — ACETAMINOPHEN 975 MG: 325 TABLET, FILM COATED ORAL at 11:25

## 2025-04-12 NOTE — ED PROVIDER NOTES
Time reflects when diagnosis was documented in both MDM as applicable and the Disposition within this note       Time User Action Codes Description Comment    4/12/2025 11:37 AM Yaima Sims Add [M25.511] Right shoulder pain           ED Disposition       ED Disposition   Discharge    Condition   Stable    Date/Time   Sat Apr 12, 2025 11:37 AM    Comment   Beth Dorado discharge to home/self care.                   Assessment & Plan       Medical Decision Making  51 yo F with R shoulder pain- xrays to r/o osseous abn, multimodal pain regimen    CXR independently interpreted by myself: no acute abn   R shoulder x-ray: calcifications/osseous areas superior to shoulder, no prior for comparison- pending rads read    Instructed to f/u with physiatry who she see or ortho/sports med as needed    Close return precautions discussed and pt expressed comfort and understanding with plan     Amount and/or Complexity of Data Reviewed  Radiology: ordered and independent interpretation performed. Decision-making details documented in ED Course.    Risk  OTC drugs.  Prescription drug management.            Medications   acetaminophen (TYLENOL) tablet 975 mg (975 mg Oral Given 4/12/25 1125)   diazepam (VALIUM) tablet 2 mg (2 mg Oral Given 4/12/25 1125)   ketorolac (TORADOL) injection 15 mg (15 mg Intramuscular Given 4/12/25 1126)       ED Risk Strat Scores                    No data recorded        SBIRT 22yo+      Flowsheet Row Most Recent Value   Initial Alcohol Screen: US AUDIT-C     1. How often do you have a drink containing alcohol? 0 Filed at: 04/12/2025 1121   2. How many drinks containing alcohol do you have on a typical day you are drinking?  0 Filed at: 04/12/2025 1121   3a. Male UNDER 65: How often do you have five or more drinks on one occasion? 0 Filed at: 04/12/2025 1121   3b. FEMALE Any Age, or MALE 65+: How often do you have 4 or more drinks on one occassion? 0 Filed at: 04/12/2025 1121   Audit-C Score 0 Filed at:  2025 1121   PHILIPP: How many times in the past year have you...    Used an illegal drug or used a prescription medication for non-medical reasons? Never Filed at: 2025 1121                            History of Present Illness       Chief Complaint   Patient presents with    Shoulder Pain     Began this past Thursday with no known injury. Pt reports that pain radiates to neck.        Past Medical History:   Diagnosis Date    Abnormal Pap smear of cervix 2011-wnl, 2021-wnl, HRHPV neg    Anesthesia     Pt reports her mom and sister have a history of being able to wake them up after surgery    Chronic fatigue     Chronic pain disorder     cervical spine pain    Disease of thyroid gland     Hashimotos    Fibromyalgia     Hashimoto's thyroiditis     Hiatal hernia     History of transfusion     Iron deficiency anemia     Kidney stone     Miscarriage     MTHFR gene mutation     Polycystic ovary syndrome     Vaginal cuff dehiscence     OR   repair today   2025      Past Surgical History:   Procedure Laterality Date     SECTION      X's 2; second one associated with uterine rupture    CYSTOSCOPY N/A 2025    Procedure: CYSTOSCOPY WITH RIGHT DOUBLE J (6 Fr X 24 CM) URETERAL STENT INSERTION AND 5 FR OPEN ENDED URETERAL CATHETER INSERTION (REMOVED AT THE END OF THE PROCEDURE);  Surgeon: Edin Poon MD;  Location[facility];  Service: Gynecology Oncology    DILATION AND CURETTAGE OF UTERUS  2007    DILATION AND CURETTAGE, DIAGNOSTIC / THERAPEUTIC      missed     FL CYSTOGRAM  2025    LEFT VENTRICULAR ASSIST DEVICE      CA CYSTOURETHROSCOPY N/A 2025    Procedure: CYSTOSCOPY;  Surgeon: Edin Poon MD;  Location[facility];  Service: Gynecology Oncology    CA LAPS TOTAL HYSTERECT 250 GM/< W/RMVL TUBE/OVARY N/A 2025    Procedure: EXAM UNDER ANESTHESIA,  ROBOTIC TYPE II RADICAL HYSTERECTOMY, BILATERAL SALPINGO-OOPHORECTOMY;  Surgeon: Edin  MD Ayah;  Location[facility];  Service: Gynecology Oncology    SC PELVIC EXAMINATION W/ANESTHESIA OTHER THAN LOCAL N/A 2025    Procedure: VAGINAL CUFF REVISION, EXAM UNDER ANESTHESIA (EUA);  Surgeon: Edin Poon MD;  [facility];  Service: Gynecology Oncology    WISDOM TOOTH EXTRACTION      WRIST SURGERY Right     exploratory      Family History   Problem Relation Age of Onset    Breast cancer Mother          age 63    Bone cancer Mother         from breast mets    Thyroid disease Mother     Heart failure Father     No Known Problems Sister     No Known Problems Brother     Uterine cancer Maternal Grandmother     No Known Problems Maternal Grandfather     Heart disease Paternal Grandfather     Ovarian cancer Neg Hx     Colon cancer Neg Hx       Social History     Tobacco Use    Smoking status: Never    Smokeless tobacco: Never   Vaping Use    Vaping status: Never Used   Substance Use Topics    Alcohol use: Not Currently    Drug use: Never      E-Cigarette/Vaping    E-Cigarette Use Never User       E-Cigarette/Vaping Substances    Nicotine No     THC No     CBD No     Flavoring No     Other No     Unknown No       I have reviewed and agree with the history as documented.     51 yo F presenting for evaluation of R shoulder pain.    Sx started 2 mornings ago when she woke up. No direct trauma, but states she was raking the day before. Pain is to R shoulder joint/superior shoulder with radiation to R upper chest wall, worse with arm movements.   Tried Ibuprofen, Lidocaine patch and leftover Oxy yesterday without sig relief  No history of similar pain    Denies radiation to back, neck, arm, numbness, weakness, SOB, fevers, chills              Review of Systems        Objective       ED Triage Vitals   Temperature Pulse Blood Pressure Respirations SpO2 Patient Position - Orthostatic VS   25 1056 25 1056 25 1056 25 1056 25 1056 25 1056   98.3 °F (36.8  °C) 99 143/63 16 99 % Sitting      Temp Source Heart Rate Source BP Location FiO2 (%) Pain Score    25 1056 -- 25 1056 -- 25 1125    Oral  Left arm  9      Vitals      Date and Time Temp Pulse SpO2 Resp BP Pain Score FACES Pain Rating User   25 1126 -- -- -- -- -- 9 -- CF   25 1125 -- -- -- -- -- 9 --    25 1056 98.3 °F (36.8 °C) 99 99 % 16 143/63 -- -- NG            Physical Exam  Vitals and nursing note reviewed.   Constitutional:       Appearance: Normal appearance.   HENT:      Head: Normocephalic and atraumatic.   Neck:      Comments: No C-spine ttp, no paraspinal cervical ttp  Cardiovascular:      Rate and Rhythm: Normal rate and regular rhythm.   Pulmonary:      Effort: Pulmonary effort is normal. No respiratory distress.      Breath sounds: No stridor. No wheezing or rhonchi.   Musculoskeletal:      Right shoulder: Tenderness and bony tenderness present. No swelling, deformity, effusion, laceration or crepitus. Normal strength. Normal pulse.      Right upper arm: Normal. No swelling or tenderness.      Right elbow: Normal. No swelling or deformity. Normal range of motion. No tenderness.      Right forearm: Normal. No swelling or tenderness.      Right wrist: Normal.      Right hand: Normal. No tenderness. Normal strength.      Cervical back: Normal range of motion.   Skin:     General: Skin is warm.      Findings: No rash.      Comments: No rash   Neurological:      Mental Status: She is alert.         Results Reviewed       None            XR shoulder 2+ views RIGHT    (Results Pending)   XR chest 2 views    (Results Pending)       Procedures    ED Medication and Procedure Management   Prior to Admission Medications   Prescriptions Last Dose Informant Patient Reported? Taking?   NON FORMULARY  Self Yes No   Sig: Estrogen Skin Cream for menopausal symptoms   NP Thyroid 30 MG tablet  Self Yes No   Si mg 3 times a week and 60 mg 4 times a week ( alternate)    rimegepant sulfate (NURTEC) 75 mg TBDP  Self Yes No   Sig: Take 75 mg by mouth if needed      Facility-Administered Medications: None     Discharge Medication List as of 4/12/2025 12:22 PM        START taking these medications    Details   methocarbamol (ROBAXIN) 500 mg tablet Take 1 tablet (500 mg total) by mouth 2 (two) times a day as needed for muscle spasms, Starting Sat 4/12/2025, Normal           CONTINUE these medications which have NOT CHANGED    Details   NON FORMULARY Estrogen Skin Cream for menopausal symptoms, Historical Med      NP Thyroid 30 MG tablet 90 mg 3 times a week and 60 mg 4 times a week ( alternate), Historical Med      rimegepant sulfate (NURTEC) 75 mg TBDP Take 75 mg by mouth if needed, Starting Mon 12/11/2023, Historical Med           No discharge procedures on file.  ED SEPSIS DOCUMENTATION   Time reflects when diagnosis was documented in both MDM as applicable and the Disposition within this note       Time User Action Codes Description Comment    4/12/2025 11:37 AM Yaima Sims Add [M25.511] Right shoulder pain                  Yaima Sims DO  04/12/25 6559

## 2025-04-12 NOTE — DISCHARGE INSTRUCTIONS
Tylenol 650-1000 mg every 4- 6 hours as needed (Do not exceed 3000 mg in 24 hours)    Ibuprofen 400-600 mg every 6 hours as needed    Lidoderm patches- on for 12 hours/off for 12 hours    Robaxin as needed- do not drive/work while taking    Follow up with PCP and Physiatry that you already see    Return to the ED if new/worsening symptoms including but not limited to fevers, numbness, weakness, difficulty breathing, etc.

## 2025-04-15 ENCOUNTER — TELEPHONE (OUTPATIENT)
Age: 50
End: 2025-04-15

## 2025-04-17 ENCOUNTER — OFFICE VISIT (OUTPATIENT)
Age: 50
End: 2025-04-17
Payer: COMMERCIAL

## 2025-04-17 ENCOUNTER — APPOINTMENT (OUTPATIENT)
Age: 50
End: 2025-04-17
Attending: ORTHOPAEDIC SURGERY
Payer: COMMERCIAL

## 2025-04-17 VITALS — BODY MASS INDEX: 20.33 KG/M2 | WEIGHT: 122 LBS | HEIGHT: 65 IN

## 2025-04-17 DIAGNOSIS — M25.511 RIGHT SHOULDER PAIN, UNSPECIFIED CHRONICITY: Primary | ICD-10-CM

## 2025-04-17 DIAGNOSIS — M25.511 RIGHT SHOULDER PAIN, UNSPECIFIED CHRONICITY: ICD-10-CM

## 2025-04-17 PROCEDURE — 99204 OFFICE O/P NEW MOD 45 MIN: CPT | Performed by: ORTHOPAEDIC SURGERY

## 2025-04-17 PROCEDURE — 73020 X-RAY EXAM OF SHOULDER: CPT

## 2025-04-17 RX ORDER — MELOXICAM 15 MG/1
15 TABLET ORAL DAILY
Qty: 20 TABLET | Refills: 0 | Status: SHIPPED | OUTPATIENT
Start: 2025-04-17

## 2025-04-17 NOTE — PROGRESS NOTES
:  Assessment & Plan  Right shoulder pain, unspecified chronicity     RIGHT shoulder payam-AC joint pain, payam AC joint calcifications  PT script  Mobic prescribed to use as needed  Consider AC joint injection if needed in the future         REASON FOR VISIT  Chief Complaint   Patient presents with    Right Shoulder - Pain       HISTORY OF PRESENT ILLNESS:  Beth Dorado is a 50 y.o. year old right hand dominant female who presents with RIGHT shoulder pain.    RIGHT superior shoulder pain  Pain for 1 week  Woke up with pain  Pain radiating down anterior clavicle  Got worse over the next days     No old injuries       Went to ER for this  Had toradol injection    Took Tylenol and ibuprofen     Hx of RIGHT frozen shoulder which resolved on its own      Past Medical and Surgical History:  Past Medical History:   Diagnosis Date    Abnormal Pap smear of cervix 2011-wnl, 2021-wnl, HRHPV neg    Anesthesia     Pt reports her mom and sister have a history of being able to wake them up after surgery    Chronic fatigue     Chronic pain disorder     cervical spine pain    Disease of thyroid gland     Hashimotos    Fibromyalgia     Hashimoto's thyroiditis     Hiatal hernia     History of transfusion     Iron deficiency anemia     Kidney stone     Miscarriage     MTHFR gene mutation     Polycystic ovary syndrome 1993    Vaginal cuff dehiscence     OR   repair today   2025       Past Surgical History:   Procedure Laterality Date     SECTION      X's 2; second one associated with uterine rupture    CYSTOSCOPY N/A 2025    Procedure: CYSTOSCOPY WITH RIGHT DOUBLE J (6 Fr X 24 CM) URETERAL STENT INSERTION AND 5 FR OPEN ENDED URETERAL CATHETER INSERTION (REMOVED AT THE END OF THE PROCEDURE);  Surgeon: Edni Poon MD;  Location[facility];  Service: Gynecology Oncology    DILATION AND CURETTAGE OF UTERUS  2007    DILATION AND CURETTAGE, DIAGNOSTIC / THERAPEUTIC      missed     FL  CYSTOGRAM  01/20/2025    LEFT VENTRICULAR ASSIST DEVICE      FL CYSTOURETHROSCOPY N/A 4/2/2025    Procedure: CYSTOSCOPY;  Surgeon: Edin Poon MD;  [facility];  Service: Gynecology Oncology    FL LAPS TOTAL HYSTERECT 250 GM/< W/RMVL TUBE/OVARY N/A 01/09/2025    Procedure: EXAM UNDER ANESTHESIA,  ROBOTIC TYPE II RADICAL HYSTERECTOMY, BILATERAL SALPINGO-OOPHORECTOMY;  Surgeon: Edin Poon MD;  [facility];  Service: Gynecology Oncology    FL PELVIC EXAMINATION W/ANESTHESIA OTHER THAN LOCAL N/A 4/2/2025    Procedure: VAGINAL CUFF REVISION, EXAM UNDER ANESTHESIA (EUA);  Surgeon: Edin Poon MD;  [facility];  Service: Gynecology Oncology    WISDOM TOOTH EXTRACTION      WRIST SURGERY Right     exploratory       Medications:    Current Outpatient Medications:     lidocaine (Lidoderm) 5 %, Apply 1 patch topically over 12 hours daily for 9 doses Remove & Discard patch within 12 hours or as directed by MD, Disp: 9 patch, Rfl: 0    methocarbamol (ROBAXIN) 500 mg tablet, Take 1 tablet (500 mg total) by mouth 2 (two) times a day as needed for muscle spasms, Disp: 10 tablet, Rfl: 0    NON FORMULARY, Estrogen Skin Cream for menopausal symptoms, Disp: , Rfl:     NP Thyroid 30 MG tablet, 90 mg 3 times a week and 60 mg 4 times a week ( alternate), Disp: , Rfl:     rimegepant sulfate (NURTEC) 75 mg TBDP, Take 75 mg by mouth if needed, Disp: , Rfl:     Allergies:  Allergies   Allergen Reactions    Sulfamethoxazole GI Intolerance    Trimethoprim GI Intolerance    Nitrofurantoin GI Intolerance and Nausea Only    Penicillins Hallucinations and Other (See Comments)     hallucinations, delusions, vomiting           PE:  Pertinent Positive Findings in shoulder exam:    Motion (AROM-PROM):    Forward Flexion R: 140 L : 160   Abduction: R: 140 L: 140   External Rotation: R: 50 L : 60  Internal rotation Adduction: R:sacrum L: T10  ABduction/ER: Right 80@ 90 degrees, Left: 80@ 90 degrees    Abduction/lR: Right 80@ 90 degrees, Left: 80@ 90 degrees      RIGHT shoulder:    Supraspinatus strength 5/5   lnfraspinatus strength 5/5   Tenderness [x] AC joint [] Biceps Groove [] None   Cross body adduction [] Positive [x] Negative   Bullard' [] Positive [x] Negative   Neer's [] Positive [x] Negative   Empty Can [] Positive [x] Negative   ER lag [] Positive [x] Negative   Horn blower's [] Positive [x] Negative   Belly Press [] Positive [x] Negative   Lift Off [] Positive [x] Negative   Bear Hug [] Positive [x] Negative   Banks's sign [x] Positive [] Negative   Speed's sign [] Positive [x] Negative       Sensory: Intact sensation in axillary, lateral antebrachial cutaneous, median, superficial branch radial, and ulnar nerve distributions.    Vascular exam: warm and well perfused digits.     Skin: intact, no rashes or lesions.      Radiology: I independently reviewed and interpreted the images.  Radiographs: RIGHT shoulder X-Ray: possible small payam-AC calcification, no significant signs of arthritis with preserved joint space      CC:  Osiel Cruz, DO Self, Referral

## 2025-04-29 ENCOUNTER — OFFICE VISIT (OUTPATIENT)
Dept: GYNECOLOGIC ONCOLOGY | Facility: CLINIC | Age: 50
End: 2025-04-29

## 2025-04-29 VITALS
WEIGHT: 123.2 LBS | OXYGEN SATURATION: 98 % | BODY MASS INDEX: 20.53 KG/M2 | HEART RATE: 78 BPM | TEMPERATURE: 98.8 F | HEIGHT: 65 IN | RESPIRATION RATE: 18 BRPM | SYSTOLIC BLOOD PRESSURE: 122 MMHG | DIASTOLIC BLOOD PRESSURE: 68 MMHG

## 2025-04-29 DIAGNOSIS — T81.328A DEHISCENCE OF VAGINAL CUFF, INITIAL ENCOUNTER: Primary | ICD-10-CM

## 2025-04-29 PROBLEM — N13.39 OTHER HYDRONEPHROSIS: Status: RESOLVED | Noted: 2025-02-25 | Resolved: 2025-04-29

## 2025-04-29 PROCEDURE — 99024 POSTOP FOLLOW-UP VISIT: CPT | Performed by: OBSTETRICS & GYNECOLOGY

## 2025-04-29 NOTE — PROGRESS NOTES
"Name: Beth Dorado      : 1975      MRN: [id number]  Encounter Provider: Edin Poon MD  Encounter Date: 2025   Encounter department: [facility] [place]  :  Assessment & Plan  Dehiscence of vaginal cuff, initial encounter  I have independently obtained history from patient today.  Pelvic exam performed.    Vaginal cuff is well reapproximated.  #1 Vicryl sutures in place with minimal surrounding relation tissue around each entry/exit site of the suture these were touched with silver nitrate.  No evidence of dehiscence.    Recommended to refrain from vaginal records for additional 3 weeks.  Will repeat final vaginal exam in 3 weeks and hopefully clear patient to resume to all activities at that time.                 History of Present Illness   Reason for Visit / CC: Postoperative follow-up   Beth Dorado is a 50 y.o. female   Patient is 4 weeks out after vaginal cuff revision for nonhealing vaginal cuff after prior complex hysterectomy for pelvic pain, abnormal uterine bleeding and endometriosis.  Since procedure, reports no pain.  Minimal/intermittent pinkish discharge.  Normal bowel and bladder function.  No other complaints.      Pertinent Medical History     Hypothyroidism, MTHFR, herniated cervical disc.    As per problem list.  Noncontributory.       Review of Systems A complete review of systems is negative other than that noted above in the HPI.       Objective   /68 (BP Location: Left arm, Patient Position: Sitting, Cuff Size: Adult)   Pulse 78   Temp 98.8 °F (37.1 °C)   Resp 18   Ht 5' 5\" (1.651 m)   Wt 55.9 kg (123 lb 3.2 oz)   LMP  (LMP Unknown)   SpO2 98%   BMI 20.50 kg/m²     Body mass index is 20.5 kg/m².  Pain Screening:  Pain Score: 0-No pain    Physical Exam   Pelvic: Normal external genitalia.  Vulva vagina with no lesions.  Vaginal cuff with suture material in place.  Pinpoint correlation tissue around the entry/exit suture sites " identified and touched with silver nitrate.  No evidence of dehiscence.  Vagina and vulva appear well estrogenized.    Edin Poon MD, JOSIANE, FACOG, FACS  4/29/2025  2:58 PM

## 2025-04-29 NOTE — ASSESSMENT & PLAN NOTE
I have independently obtained history from patient today.  Pelvic exam performed.    Vaginal cuff is well reapproximated.  #1 Vicryl sutures in place with minimal surrounding relation tissue around each entry/exit site of the suture these were touched with silver nitrate.  No evidence of dehiscence.    Recommended to refrain from vaginal records for additional 3 weeks.  Will repeat final vaginal exam in 3 weeks and hopefully clear patient to resume to all activities at that time.

## 2025-04-30 ENCOUNTER — EVALUATION (OUTPATIENT)
Dept: PHYSICAL THERAPY | Facility: CLINIC | Age: 50
End: 2025-04-30
Attending: ORTHOPAEDIC SURGERY
Payer: COMMERCIAL

## 2025-04-30 DIAGNOSIS — M25.511 RIGHT SHOULDER PAIN, UNSPECIFIED CHRONICITY: ICD-10-CM

## 2025-04-30 PROCEDURE — 97110 THERAPEUTIC EXERCISES: CPT

## 2025-04-30 PROCEDURE — 97162 PT EVAL MOD COMPLEX 30 MIN: CPT

## 2025-04-30 NOTE — PROGRESS NOTES
PT Evaluation     Today's date: 2025  Patient name: Beth Dorado  : 1975  MRN: [id number]  Referring provider: Severo Graff MD  Dx:   Encounter Diagnosis     ICD-10-CM    1. Right shoulder pain, unspecified chronicity  M25.511 Ambulatory Referral to Physical Therapy          Start Time: 0830  Stop Time: 0915  Total time in clinic (min): 45 minutes    Assessment  Impairments: abnormal muscle firing, abnormal or restricted ROM, abnormal movement, activity intolerance, impaired physical strength, lacks appropriate home exercise program, pain with function, poor posture  and poor body mechanics  Symptom irritability: moderate    Assessment details: Patient is a 49 y/o female presenting to PT with complaints of R shoulder pain beginning 3 weeks ago. Upon clinical exam, patient presents with decreased R shoulder ROM, decreased R shoulder strength, and decreased muscle flexibility in her R cervical and shoulder musculature. These impairments limit the patient with playing guitar, reaching, and UB dressing. Exam findings and clinical signs and symptoms are suggestive of cervical radiculopathy and R AC joint pathology. Patient will benefit from physical therapy to address the above impairments and maximize functional mobility.     Understanding of Dx/Px/POC: good     Prognosis: good    Goals  STG - to be met by week 4  1. Patient will be independent with HEP throughout therapy to prepare for eventual transition to maintenance program.  2. Patient will improve subjective pain to <=6/10 at worst to improve QOL.  3. Patient will improve R shoulder flexion AROM to 170 deg to perform ADLs with less difficulty.    LTG - to be met by discharge   1. Patient will improve R shoulder AROM to grossly WFL to perform ADLs with less difficulty.  2. Patient will improve R shoulder strength to 5/5 to improve functional mobility.  3. Patient will improve FOTO score to age matched prediction to demonstrate functional  improvements.  4. Patient will be able to perform UB dressing without difficulty to return to PLOF.  5. Patient will be able to play guitar without difficulty to return to PLOF.        Plan  Patient would benefit from: skilled physical therapy  Referral necessary: No  Planned modality interventions: cryotherapy and thermotherapy: hydrocollator packs    Planned therapy interventions: abdominal trunk stabilization, activity modification, balance/weight bearing training, body mechanics training, flexibility, functional ROM exercises, graded activity, graded exercise, home exercise program, manual therapy, neuromuscular re-education, patient/caregiver education, postural training, strengthening, stretching, therapeutic activities and therapeutic exercise    Frequency: 2x week  Duration in weeks: 10  Treatment plan discussed with: patient        Subjective Evaluation    History of Present Illness  Date of onset: 4/9/2025  Mechanism of injury: Patient reports R shoulder pain beginning 3 weeks ago. Patient reports no specific ALEXEY but notes she woke up with the pain and she sleeps on her R side. Reports the pain also radiates to her neck and clavicle. Patient reports difficulty with playing guitar, reaching, and UB dressing due to her pain. Reports she went to the ED for this on 4/12/25 where x-rays were taken showing calcification on her AC joint. Denies new onset N/T. Also notes she has a hx of neck pain in the past including herniated discs and a hx of R frozen shoulder 4 years ago which almost fully recovered. Also notes a hx of migraines that increases her R sided tightness. Notes she had an EMG recently which showed no compression in her neck but B nerve damage causing chronic numbness in her R hand and that occurs intermittently and L forearm pain when playing guitar.     Patient Goals  Patient goals for therapy: decreased pain, increased motion, increased strength, independence with ADLs/IADLs and return to  sport/leisure activities  Patient goal: playing guitar, dressing, playing piano  Pain  Current pain ratin  At best pain ratin  At worst pain rating: 10  Location: R shoulder AC joint  Quality: radiating, dull ache and throbbing      Diagnostic Tests  X-ray: abnormal (calcifications/osseous areas superior to shoulder)  Treatments  Previous treatment: medication  Current treatment: physical therapy        Objective     Postural Observations  Seated posture: fair    Additional Postural Observation Details  Rounded shoulders    Palpation     Additional Palpation Details  TTP R AC joint, 1st rib, UT, levator scap, cervical paraspinals    Cervical/Thoracic Screen   Cervical range of motion within normal limits with the following exceptions: Minimal limitation and pain with cervical extension, L SB and L rotation with RUE pulling noted    Repeated Movement Testing:   Repeated cervical flexion: increase RUE pain/pulling, NW  Repeated cervical retraction (seated): NE  Repeated cervical retraction (supine): increase R shoulder, NW      Neurological Testing     Sensation     Shoulder   Left Shoulder   Intact: light touch    Right Shoulder   Diminished: Light touch    Comments   Right light touch: C3    Active Range of Motion   Left Shoulder   Flexion: 160 degrees   Abduction: 170 degrees   External rotation 0°: 50 degrees   External rotation BTH: T3   Internal rotation BTB: T8     Right Shoulder   Flexion: 150 degrees with pain  Abduction: 125 degrees with pain  External rotation 0°: 45 degrees   External rotation BTH: T1   Internal rotation BTB: L4 with pain    Passive Range of Motion     Right Shoulder   Flexion: 90 degrees with pain  Abduction: 170 degrees   External rotation 45°: 45 degrees   Internal rotation 45°: 30 degrees with pain    Additional Passive Range of Motion Details  R shoulder PROM limited due to pain and muscle guarding     Joint Play     Right Shoulder  Hypomobile in the AC joint and 1st rib.      Strength/Myotome Testing     Left Shoulder     Planes of Motion   Flexion: 4   Abduction: 4   External rotation at 0°: 4   Internal rotation at 0°: 4     Isolated Muscles   Lower trapezius: 4-   Middle trapezius: 4-     Right Shoulder     Planes of Motion   Flexion: 4-   Abduction: 4-   External rotation at 0°: 4   Internal rotation at 0°: 4     Isolated Muscles   Lower trapezius: 4-   Middle trapezius: 4-     Tests     Right Shoulder   Positive drop arm, horn blower, painful arc and passive horizontal adduction.     Additional Tests Details  R pectoralis tightness noted      Flowsheet Rows      Flowsheet Row Most Recent Value   PT/OT G-Codes    Current Score 55   Projected Score 69               Precautions: none noted      Manuals 4/30            R shoulder PROM             R 1st rib mob and AC mob             R shoulder mx STM                          Neuro Re-Ed             Scap retractions HEP            Serratus punches             Scap 4                                                                              Ther Ex             UBE             Wall slides             Doorway pec stretch HEP high and low            Open books             Self 1st rib mob HEP            S/l shoulder ER             Cervical SNAGs HEP                         Ther Activity                                       Gait Training                                       Modalities

## 2025-05-06 ENCOUNTER — OFFICE VISIT (OUTPATIENT)
Dept: PHYSICAL THERAPY | Facility: CLINIC | Age: 50
End: 2025-05-06
Attending: ORTHOPAEDIC SURGERY
Payer: COMMERCIAL

## 2025-05-06 DIAGNOSIS — M25.511 RIGHT SHOULDER PAIN, UNSPECIFIED CHRONICITY: Primary | ICD-10-CM

## 2025-05-06 PROCEDURE — 97140 MANUAL THERAPY 1/> REGIONS: CPT

## 2025-05-06 PROCEDURE — 97110 THERAPEUTIC EXERCISES: CPT

## 2025-05-06 PROCEDURE — 97112 NEUROMUSCULAR REEDUCATION: CPT

## 2025-05-06 NOTE — PROGRESS NOTES
"Daily Note     Today's date: 2025  Patient name: Beth Dorado  : 1975  MRN: [id number]  Referring provider: Severo Graff MD  Dx:   Encounter Diagnosis     ICD-10-CM    1. Right shoulder pain, unspecified chronicity  M25.511           Start Time: 08  Stop Time: 09  Total time in clinic (min): 45 minutes    Subjective: Patient reports her shoulder is feeling the same and notes pain in her anterior shoulder and tightness in her neck.      Objective: See treatment diary below      Assessment: Tolerated treatment well. Relief and improved PROM noted following STM to her lateral scap mx. Added LTR with thoracic ext to further improve thoracic and lateral scap muscle flexibility. Increased R shoulder soreness and fatigue noted post session, ended with MHP with relief noted. Patient demonstrated fatigue post treatment, exhibited good technique with therapeutic exercises, and would benefit from continued PT      Plan: Continue per plan of care.  Progress treatment as tolerated.       Precautions: none noted      Manuals 6           R shoulder PROM  MK           R 1st rib mob and AC mob  MK           R shoulder mx STM  MK pec and lateral scap                        Neuro Re-Ed             Scap retractions HEP 5\"x10           Serratus punches  5\"x10           Scap 4                                                                              Ther Ex             UBE / Pulley  Pulley 2x10           Wall slides             Doorway pec stretch HEP high and low            Open books             Self 1st rib mob HEP            S/l shoulder ER  Nv?           Cervical SNAGs HEP            Thoracic ext with LTR  1x5 B           Ther Activity                                       Gait Training                                       Modalities             MHP  10 min post                             "

## 2025-05-09 ENCOUNTER — OFFICE VISIT (OUTPATIENT)
Dept: PHYSICAL THERAPY | Facility: CLINIC | Age: 50
End: 2025-05-09
Attending: ORTHOPAEDIC SURGERY
Payer: COMMERCIAL

## 2025-05-09 DIAGNOSIS — M25.511 RIGHT SHOULDER PAIN, UNSPECIFIED CHRONICITY: Primary | ICD-10-CM

## 2025-05-09 PROCEDURE — 97112 NEUROMUSCULAR REEDUCATION: CPT

## 2025-05-09 PROCEDURE — 97140 MANUAL THERAPY 1/> REGIONS: CPT

## 2025-05-09 PROCEDURE — 97110 THERAPEUTIC EXERCISES: CPT

## 2025-05-09 NOTE — PROGRESS NOTES
"Daily Note     Today's date: 2025  Patient name: Beth Dorado  : 1975  MRN: [id number]  Referring provider: Severo Graff MD  Dx:   Encounter Diagnosis     ICD-10-CM    1. Right shoulder pain, unspecified chronicity  M25.511             Start Time: 915  Stop Time: 1000  Total time in clinic (min): 45 minutes    Subjective: Patient reports her shoulder continues to have pain in her anterior shoulder and AC joint region but notes HEP seems helpful to loosen up shoulder. Notes thoracic ext on TR aggravated her back.       Objective: See treatment diary below      Assessment: Tolerated treatment well. Improved R shoulder PROM noted following STM and mobs with increased tightness noted in her R shoulder and neck mx compared to the L. Continued focus on R cervical and shoulder stretching and was able to initiate further scapular and posterior shoulder strength. Patient demonstrated fatigue post treatment, exhibited good technique with therapeutic exercises, and would benefit from continued PT      Plan: Continue per plan of care.  Progress treatment as tolerated.       Precautions: none noted      Manuals           R shoulder PROM  MK MK          R 1st rib mob and AC mob  MK MK and GH inf/post          R neck and shoulder mx STM  MK pec and lateral scap MK pec and lateral scap                       Neuro Re-Ed             Scap retractions HEP 5\"x10           Serratus punches  5\"x10 5\"x10          Scap 4   Rows GTB 5\"x10                                                                           Ther Ex             UBE / Pulley  Pulley 2x10 Pulley 2x10          Wall slides flx and abd   nv          Doorway pec stretch HEP high and low  5x10\" high          Open books             Self 1st rib mob HEP  5\"X10 R          S/l shoulder ER  Nv? 5\"x10 R          Cervical SNAGs HEP            Thoracic ext with LTR  1x5 B hold          Ther Activity                                       Gait Training           "                             Modalities             MHP  10 min post 10 min post

## 2025-05-12 ENCOUNTER — APPOINTMENT (OUTPATIENT)
Dept: PHYSICAL THERAPY | Facility: CLINIC | Age: 50
End: 2025-05-12
Attending: ORTHOPAEDIC SURGERY
Payer: COMMERCIAL

## 2025-05-12 NOTE — PROGRESS NOTES
"Daily Note     Today's date: 2025  Patient name: Beth Dorado  : 1975  MRN: [id number]  Referring provider: Severo Graff MD  Dx:   No diagnosis found.                   Subjective: Patient reports her shoulder continues to have pain in her anterior shoulder and AC joint region but notes HEP seems helpful to loosen up shoulder. Notes thoracic ext on TR aggravated her back.       Objective: See treatment diary below      Assessment: Tolerated treatment well. Improved R shoulder PROM noted following STM and mobs with increased tightness noted in her R shoulder and neck mx compared to the L. Continued focus on R cervical and shoulder stretching and was able to initiate further scapular and posterior shoulder strength. Patient demonstrated fatigue post treatment, exhibited good technique with therapeutic exercises, and would benefit from continued PT      Plan: Continue per plan of care.  Progress treatment as tolerated.       Precautions: none noted      Manuals          R shoulder PROM  MK MK          R 1st rib mob and AC mob  MK MK and GH inf/post          R neck and shoulder mx STM  MK pec and lateral scap MK pec and lateral scap                       Neuro Re-Ed             Scap retractions HEP 5\"x10           Serratus punches  5\"x10 5\"x10 ***         Scap 4   Rows GTB 5\"x10 ***                                                                          Ther Ex             UBE / Pulley  Pulley 2x10 Pulley 2x10 ***         Wall slides flx and abd   nv          Doorway pec stretch HEP high and low  5x10\" high          Open books             Self 1st rib mob HEP  5\"X10 R ***         S/l shoulder ER  Nv? 5\"x10 R ***         Cervical SNAGs HEP            Thoracic ext with LTR  1x5 B hold          Ther Activity                                       Gait Training                                       Modalities             MHP  10 min post 10 min post                            "

## 2025-05-15 ENCOUNTER — OFFICE VISIT (OUTPATIENT)
Dept: PHYSICAL THERAPY | Facility: CLINIC | Age: 50
End: 2025-05-15
Attending: ORTHOPAEDIC SURGERY
Payer: COMMERCIAL

## 2025-05-15 DIAGNOSIS — M25.511 RIGHT SHOULDER PAIN, UNSPECIFIED CHRONICITY: Primary | ICD-10-CM

## 2025-05-15 PROCEDURE — 97140 MANUAL THERAPY 1/> REGIONS: CPT

## 2025-05-15 PROCEDURE — 97110 THERAPEUTIC EXERCISES: CPT

## 2025-05-15 PROCEDURE — 97112 NEUROMUSCULAR REEDUCATION: CPT

## 2025-05-15 NOTE — PROGRESS NOTES
"Daily Note     Today's date: 5/15/2025  Patient name: Beth Dorado  : 1975  MRN: [id number]  Referring provider: Severo Graff MD  Dx:   Encounter Diagnosis     ICD-10-CM    1. Right shoulder pain, unspecified chronicity  M25.511                          Subjective: Patient reports anterior right shoulder is sore today.        Objective: See treatment diary below      Assessment: Tolerated treatment well. Progressed with shoulder mobility and pectoralis stretches with improvement noted.   Continued focus on R cervical and shoulder stretching and was able to initiate further scapular and posterior shoulder strength. Patient demonstrated fatigue post treatment, exhibited good technique with therapeutic exercises, and would benefit from continued PT      Plan: Continue per plan of care.  Progress treatment as tolerated.       Precautions: none noted      Manuals 4/30 5/6 5/9 5/12 5/15        R shoulder PROM  MK MK  KSG        R 1st rib mob and AC mob  MK MK and GH inf/post  KSG        R neck and shoulder mx STM  MK pec and lateral scap MK pec and lateral scap  KSG                     Neuro Re-Ed             Scap retractions HEP 5\"x10           Serratus punches  5\"x10 5\"x10          Scap 4   Rows GTB 5\"x10  GTRRows 5\" 10                                                                         Ther Ex             UBE / Pulley  Pulley 2x10 Pulley 2x10  Pulley 2x10        Wall slides flx and abd   nv  X10  flex B        Doorway pec stretch HEP high and low  5x10\" high  5x10\" high        Open books     5\" x10        Self 1st rib mob HEP  5\"X10 R          S/l shoulder ER  Nv? 5\"x10 R  5\" x10        Cane flex to ER behind head     10x                                   Cervical SNAGs HEP            Thoracic ext with LTR  1x5 B hold          Ther Activity                                       Gait Training                                       Modalities             MHP  10 min post 10 min post  10 min post               "

## 2025-05-19 ENCOUNTER — OFFICE VISIT (OUTPATIENT)
Dept: PHYSICAL THERAPY | Facility: CLINIC | Age: 50
End: 2025-05-19
Attending: ORTHOPAEDIC SURGERY
Payer: COMMERCIAL

## 2025-05-19 DIAGNOSIS — M25.511 RIGHT SHOULDER PAIN, UNSPECIFIED CHRONICITY: Primary | ICD-10-CM

## 2025-05-19 PROCEDURE — 97112 NEUROMUSCULAR REEDUCATION: CPT

## 2025-05-19 PROCEDURE — 97140 MANUAL THERAPY 1/> REGIONS: CPT

## 2025-05-19 PROCEDURE — 97110 THERAPEUTIC EXERCISES: CPT

## 2025-05-19 NOTE — PROGRESS NOTES
"Daily Note     Today's date: 2025  Patient name: Beth Dorado  : 1975  MRN: [id number]  Referring provider: Severo Graff MD  Dx:   Encounter Diagnosis     ICD-10-CM    1. Right shoulder pain, unspecified chronicity  M25.511           Start Time: 1622  Stop Time: 1700  Total time in clinic (min): 38 minutes    Subjective: Patient reports her R shoulder is sore and has noticed her L shoulder and arm has been hurting since last week.        Objective: See treatment diary below    Repeated movement  Flexion: increase LUE, NW  Seated cervical retraction: NE  Supine cervical retraction: NE      Assessment: Tolerated treatment well. Improving R shoulder PROM and AAROM noted with further improvements noted post manuals and stretching. Able to progress posterior chain strengthening without anterior shoulder pain. Did have some L shoulder and neck pain that that seemed to improve with supine cervical retractions. Patient demonstrated fatigue post treatment, exhibited good technique with therapeutic exercises, and would benefit from continued PT      Plan: Continue per plan of care.  Progress treatment as tolerated.       Precautions: none noted      Manuals 4/30 5/6 5/9 5/12 5/15 5/19       R shoulder PROM  MK MK  KSG MK       R 1st rib mob and AC mob  MK MK and GH inf/post  KSG MK GH inf/post       R neck and shoulder mx STM  MK pec and lateral scap MK pec and lateral scap  KSG MK and L cervical                    Neuro Re-Ed             Scap retractions HEP 5\"x10           Serratus punches  5\"x10 5\"x10          Scap 4   Rows GTB 5\"x10  GTRRows 5\" 10 Rows GTB 5\"x15                                                                        Ther Ex             UBE / Pulley  Pulley 2x10 Pulley 2x10  Pulley 2x10        Wall slides flx and abd   nv  X10  flex B        Doorway pec stretch HEP high and low  5x10\" high  5x10\" high 5x10\" alt high and low       Open books     5\" x10 5\"x10       Self 1st rib mob HEP  5\"X10 R  " "        S/l shoulder ER  Nv? 5\"x10 R  5\" x10 5\"x15 R       Cane flex to ER behind head     10x  10x       Supine shoulder flexion AAROM      1x10                    Cervical SNAGs HEP            Thoracic ext with LTR  1x5 B hold          Ther Activity                                       Gait Training                                       Modalities             MHP  10 min post 10 min post  10 min post 10 min post                         "

## 2025-05-20 ENCOUNTER — OFFICE VISIT (OUTPATIENT)
Dept: GYNECOLOGIC ONCOLOGY | Facility: CLINIC | Age: 50
End: 2025-05-20
Payer: COMMERCIAL

## 2025-05-20 VITALS
WEIGHT: 126 LBS | RESPIRATION RATE: 16 BRPM | DIASTOLIC BLOOD PRESSURE: 82 MMHG | TEMPERATURE: 97.4 F | OXYGEN SATURATION: 100 % | SYSTOLIC BLOOD PRESSURE: 134 MMHG | HEART RATE: 85 BPM | BODY MASS INDEX: 20.99 KG/M2 | HEIGHT: 65 IN

## 2025-05-20 DIAGNOSIS — T81.328A DEHISCENCE OF VAGINAL CUFF, INITIAL ENCOUNTER: Primary | ICD-10-CM

## 2025-05-20 DIAGNOSIS — N95.2 ATROPHIC VAGINITIS: ICD-10-CM

## 2025-05-20 PROCEDURE — 99212 OFFICE O/P EST SF 10 MIN: CPT | Performed by: OBSTETRICS & GYNECOLOGY

## 2025-05-20 PROCEDURE — 99459 PELVIC EXAMINATION: CPT | Performed by: OBSTETRICS & GYNECOLOGY

## 2025-05-20 RX ORDER — ESTRADIOL 0.1 MG/G
CREAM VAGINAL
Qty: 42.5 G | Refills: 1 | Status: SHIPPED | OUTPATIENT
Start: 2025-05-20

## 2025-05-20 NOTE — PROGRESS NOTES
Name: Beth Dorado      : 1975      MRN: [id number]  Encounter Provider: Edin Poon MD  Encounter Date: 2025   Encounter department: [facility] [place]  :  Assessment & Plan  Dehiscence of vaginal cuff, initial encounter  I have independently obtained history from patient today.  Pelvic exam performed.    Vaginal epithelium is completely healed.  There is no residual suture material.  No active bleeding or discharge.  Bimanual exam is confirmatory with no evidence of separation or dehiscence.  Patient is evidently somewhat uncomfortable after chronic inflammation from dehiscence and subsequent revision/repair.    I have recommended a taper of topical vaginal estrogen in addition to her systemic estrogen replacement therapy.  She will start using topical vaginal estrogen nightly then 3 times a week for a few weeks and then once to twice a week subsequently.  At this point, she can return to see Dr. Espinal for routine gynecologic needs.  She will contact me if she has any questions or concerns.  Orders:  •  estradiol (ESTRACE VAGINAL) 0.1 mg/g vaginal cream; Apply half applicatorful in the vagina nightly for 1 week, then 3 times a week for 2 weeks, then once to twice a week.    Atrophic vaginitis  Vaginal cuff dehiscence necessitating repair.  Recommended topical vaginal estrogen in addition to systemic transdermal estrogen replacement therapy.  Instructions provided.  Prescription sent to pharmacy.               History of Present Illness   Reason for Visit / CC: Follow-up for vaginal cuff dehiscence   Beth Dorado is a 50 y.o. female   Patient presents for vaginal cuff exam.  Reports intermittent tan-colored discharge.  No bleeding.  No pain.  Normal bowel and bladder function.  No other complaints.      Pertinent Medical History     Hypothyroidism, MTHFR, herniated cervical disc.    As per problem list.  Noncontributory.       Review of Systems A complete review of  "systems is negative other than that noted above in the HPI.       Objective   /82 (BP Location: Right arm, Patient Position: Sitting, Cuff Size: Standard)   Pulse 85   Temp (!) 97.4 °F (36.3 °C) (Temporal)   Resp 16   Ht 5' 5\" (1.651 m)   Wt 57.2 kg (126 lb)   LMP  (LMP Unknown)   SpO2 100%   BMI 20.97 kg/m²     Body mass index is 20.97 kg/m².  Pain Screening:  Pain Score: 0-No pain    Physical Exam  Vitals reviewed. Exam conducted with a chaperone present.   Constitutional:       General: She is not in acute distress.     Appearance: Normal appearance. She is normal weight. She is not ill-appearing or toxic-appearing.   Genitourinary:     Comments: Normal external female genitalia. Normal Bartholin's and Darfur's glands. Normal urethral meatus and no evidence of urethral discharge or masses.  Bladder without fullness mass or tenderness. Vagina appears mildly atrophic but well healed and reapproximated, no suture material, no lesions or discharge. No significant pelvic organ prolapse noted.  Cervix and uterus are surgically absent.  Bimanual exam demonstrates no evidence of pelvic masses.  Anus without fissure of lesion.      Musculoskeletal:      Right lower leg: No edema.      Left lower leg: No edema.        Edin Poon MD, JOSIANE, FACOG, FACS  5/20/2025  8:56 AM      "

## 2025-05-20 NOTE — ASSESSMENT & PLAN NOTE
I have independently obtained history from patient today.  Pelvic exam performed.    Vaginal epithelium is completely healed.  There is no residual suture material.  No active bleeding or discharge.  Bimanual exam is confirmatory with no evidence of separation or dehiscence.  Patient is evidently somewhat uncomfortable after chronic inflammation from dehiscence and subsequent revision/repair.    I have recommended a taper of topical vaginal estrogen in addition to her systemic estrogen replacement therapy.  She will start using topical vaginal estrogen nightly then 3 times a week for a few weeks and then once to twice a week subsequently.  At this point, she can return to see Dr. Espinal for routine gynecologic needs.  She will contact me if she has any questions or concerns.  Orders:  •  estradiol (ESTRACE VAGINAL) 0.1 mg/g vaginal cream; Apply half applicatorful in the vagina nightly for 1 week, then 3 times a week for 2 weeks, then once to twice a week.

## 2025-05-20 NOTE — LETTER
May 20, 2025     Inge Espinal DO  [address] [address]    Patient: Beth Dorado   YOB: 1975   Date of Visit: 2025       Dear Dr. Inge Espinal DO:    Thank you for referring Beth Dorado to me for evaluation. Below are my notes for this consultation.    If you have questions, please do not hesitate to call me. I look forward to following your patient along with you.         Sincerely,        Edin Poon MD        CC: No Recipients    Edin Poon MD  2025  8:54 AM  Incomplete  Name: Beth Dorado      : 1975      MRN: [id number]  Encounter Provider: Edin Poon MD  Encounter Date: 2025   Encounter department: [facility] [place]  :  Assessment & Plan  Dehiscence of vaginal cuff, initial encounter  I have independently obtained history from patient today.  Pelvic exam performed.    Vaginal epithelium is completely healed.  There is no residual suture material.  No active bleeding or discharge.  Bimanual exam is confirmatory with no evidence of separation or dehiscence.  Patient is evidently somewhat uncomfortable after chronic inflammation from dehiscence and subsequent revision/repair.    I have recommended a taper of topical vaginal estrogen in addition to her systemic estrogen replacement therapy.  She will start using topical vaginal estrogen nightly then 3 times a week for a few weeks and then once to twice a week subsequently.  At this point, she can return to see Dr. Espinal for routine gynecologic needs.  She will contact me if she has any questions or concerns.    Orders:  •  estradiol (ESTRACE VAGINAL) 0.1 mg/g vaginal cream; Apply half applicatorful in the vagina nightly for 1 week, then 3 times a week for 2 weeks, then once to twice a week.            History of Present Illness  Reason for Visit / CC: Follow-up for vaginal cuff dehiscence   Beth Dorado is a 50 y.o. female   Patient presents for vaginal cuff  "exam.  Reports intermittent tan-colored discharge.  No bleeding.  No pain.  Normal bowel and bladder function.  No other complaints.      Pertinent Medical History    Hypothyroidism, MTHFR, herniated cervical disc.    As per problem list.  Noncontributory.         Review of Systems A complete review of systems is negative other than that noted above in the HPI.       Objective  /82 (BP Location: Right arm, Patient Position: Sitting, Cuff Size: Standard)   Pulse 85   Temp (!) 97.4 °F (36.3 °C) (Temporal)   Resp 16   Ht 5' 5\" (1.651 m)   Wt 57.2 kg (126 lb)   LMP  (LMP Unknown)   SpO2 100%   BMI 20.97 kg/m²     Body mass index is 20.97 kg/m².  Pain Screening:  Pain Score: 0-No pain    Physical Exam  Vitals reviewed. Exam conducted with a chaperone present.   Constitutional:       General: She is not in acute distress.     Appearance: Normal appearance. She is normal weight. She is not ill-appearing or toxic-appearing.   Genitourinary:     Comments: Normal external female genitalia. Normal Bartholin's and Deerwood's glands. Normal urethral meatus and no evidence of urethral discharge or masses.  Bladder without fullness mass or tenderness. Vagina appears mildly atrophic but well healed and reapproximated, no suture material, no lesions or discharge. No significant pelvic organ prolapse noted.  Cervix and uterus are surgically absent.  Bimanual exam demonstrates no evidence of pelvic masses.  Anus without fissure of lesion.      Musculoskeletal:      Right lower leg: No edema.      Left lower leg: No edema.        Edin Poon MD, JOSIANE, FACOG, FACS  5/20/2025  8:54 AM        "

## 2025-05-20 NOTE — ASSESSMENT & PLAN NOTE
Vaginal cuff dehiscence necessitating repair.  Recommended topical vaginal estrogen in addition to systemic transdermal estrogen replacement therapy.  Instructions provided.  Prescription sent to pharmacy.

## 2025-05-22 ENCOUNTER — APPOINTMENT (OUTPATIENT)
Dept: PHYSICAL THERAPY | Facility: CLINIC | Age: 50
End: 2025-05-22
Attending: ORTHOPAEDIC SURGERY
Payer: COMMERCIAL

## 2025-05-22 NOTE — PROGRESS NOTES
"Daily Note     Today's date: 2025  Patient name: Beth Dorado  : 1975  MRN: [id number]  Referring provider: Severo Graff MD  Dx:   No diagnosis found.                 Subjective: Patient reports her R shoulder is sore and has noticed her L shoulder and arm has been hurting since last week.        Objective: See treatment diary below    Repeated movement  Flexion: increase LUE, NW  Seated cervical retraction: NE  Supine cervical retraction: NE      Assessment: Tolerated treatment well. Improving R shoulder PROM and AAROM noted with further improvements noted post manuals and stretching. Able to progress posterior chain strengthening without anterior shoulder pain. Did have some L shoulder and neck pain that that seemed to improve with supine cervical retractions. Patient demonstrated fatigue post treatment, exhibited good technique with therapeutic exercises, and would benefit from continued PT      Plan: Continue per plan of care.  Progress treatment as tolerated.       Precautions: none noted      Manuals 4/30 5/6 5/9 5/12 5/15 5/19 5/22      R shoulder PROM  MK MK  KSG MK       R 1st rib mob and AC mob  MK MK and GH inf/post  KSG MK GH inf/post ***      R neck and shoulder mx STM  MK pec and lateral scap MK pec and lateral scap  KSG MK and L cervical ***                   Neuro Re-Ed             Scap retractions HEP 5\"x10           Serratus punches  5\"x10 5\"x10    ***      Scap 4   Rows GTB 5\"x10  GTRRows 5\" 10 Rows GTB 5\"x15 ***                                                                       Ther Ex             UBE / Pulley  Pulley 2x10 Pulley 2x10  Pulley 2x10  ***      Wall slides flx and abd   nv  X10  flex B        Doorway pec stretch HEP high and low  5x10\" high  5x10\" high 5x10\" alt high and low ***      Open books     5\" x10 5\"x10 ***      Self 1st rib mob HEP  5\"X10 R          S/l shoulder ER  Nv? 5\"x10 R  5\" x10 5\"x15 R ***      Cane flex to ER behind head     10x  10x ***      Supine " shoulder flexion AAROM      1x10       Resisted IR       ***      Cervical SNAGs HEP            Thoracic ext with LTR  1x5 B hold          Ther Activity                                       Gait Training                                       Modalities             MHP  10 min post 10 min post  10 min post 10 min post

## 2025-05-27 ENCOUNTER — OFFICE VISIT (OUTPATIENT)
Dept: PHYSICAL THERAPY | Facility: CLINIC | Age: 50
End: 2025-05-27
Attending: ORTHOPAEDIC SURGERY
Payer: COMMERCIAL

## 2025-05-27 DIAGNOSIS — M25.511 RIGHT SHOULDER PAIN, UNSPECIFIED CHRONICITY: Primary | ICD-10-CM

## 2025-05-27 PROCEDURE — 97140 MANUAL THERAPY 1/> REGIONS: CPT

## 2025-05-27 PROCEDURE — 97112 NEUROMUSCULAR REEDUCATION: CPT

## 2025-05-27 PROCEDURE — 97110 THERAPEUTIC EXERCISES: CPT

## 2025-05-27 NOTE — PROGRESS NOTES
"Daily Note     Today's date: 2025  Patient name: Beth Dorado  : 1975  MRN: [id number]  Referring provider: Severo Graff MD  Dx:   Encounter Diagnosis     ICD-10-CM    1. 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 Notes she was playing [employer] over the weekend and had a lot of soreness following.      Objective: See treatment diary below    Repeated movement  Flexion: increase LUE, NW  Seated cervical retraction: NE  Supine cervical retraction: NE      Assessment: Tolerated treatment well. Improving R shoulder PROM and AAROM noted with further improvements noted overall with most limitation at end ranges flexion and abd with lateral scap mx tightness. Continued focus on improving anterior shoulder and pec flexibility along with posterior shoulder strength and stability with good tolerance. Patient demonstrated fatigue post treatment, exhibited good technique with therapeutic exercises, and would benefit from continued PT      Plan: Continue per plan of care.  Progress treatment as tolerated.  Re-evaluation nv.     Precautions: none noted      Manuals 4/30 5/6 5/9 5/12 5/15 5/19 5/27      R shoulder PROM  MK MK  KSG MK MK      R 1st rib mob and AC mob  MK MK and GH inf/post  KSG MK GH inf/post MK GH inf/post gr.2      R neck and shoulder mx STM  MK pec and lateral scap MK pec and lateral scap  KSG MK and L cervical MK lateral scapular                   Neuro Re-Ed             Scap retractions HEP 5\"x10           Serratus punches  5\"x10 5\"x10          Scap 4   Rows GTB 5\"x10  GTRRows 5\" 10 Rows GTB 5\"x15 Rows GTB 5\"x15                                                                       Ther Ex             UBE / Pulley  Pulley 2x10 Pulley 2x10  Pulley 2x10        Wall slides flx and abd   nv  X10  flex B      " "  Doorway pec stretch HEP high and low  5x10\" high  5x10\" high 5x10\" alt high and low 5x10\" alt high and low      Open books     5\" x10 5\"x10 5\"x10      Self 1st rib mob HEP  5\"X10 R          S/l shoulder ER  Nv? 5\"x10 R  5\" x10 5\"x15 R 5\"x15 R      Cane flex to ER behind head     10x  10x 10x      Supine shoulder flexion AAROM      1x10       Resisted IR             Cervical SNAGs HEP            Thoracic ext with LTR  1x5 B hold          Ther Activity                                       Gait Training                                       Modalities             MHP  10 min post 10 min post  10 min post 10 min post 10 min post                        "

## 2025-05-29 ENCOUNTER — OFFICE VISIT (OUTPATIENT)
Dept: PHYSICAL THERAPY | Facility: CLINIC | Age: 50
End: 2025-05-29
Attending: ORTHOPAEDIC SURGERY
Payer: COMMERCIAL

## 2025-05-29 DIAGNOSIS — M25.511 RIGHT SHOULDER PAIN, UNSPECIFIED CHRONICITY: Primary | ICD-10-CM

## 2025-05-29 PROCEDURE — 97110 THERAPEUTIC EXERCISES: CPT

## 2025-05-29 PROCEDURE — 97140 MANUAL THERAPY 1/> REGIONS: CPT

## 2025-05-29 NOTE — PROGRESS NOTES
"Daily Note     Today's date: 2025  Patient name: Beth Dorado  : 1975  MRN: [id number]  Referring provider: Severo Graff MD  Dx:   Encounter Diagnosis     ICD-10-CM    1. Right shoulder pain, unspecified chronicity  M25.511                      Subjective:  Patient reports some increased ROM in her R sh, but does c/o a 5/10 pain level which increases with elevation and radiates into her R upper neck.  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  Uses heat for relief.  Compliant with HEP.      Objective: See treatment diary below      Assessment: Tolerated treatment well. Focused on manuals today PROM of R sh, cerv ROM and cerv traction within Patient's pain tolerance, as well as gentle STM.    Patient exhibited good technique with therapeutic exercises and would benefit from continued PT      Plan: Continue per plan of care.      Precautions: none noted      Manuals 4/30 5/6 5/9 5/12 5/15 5/19 5/27 5/29     R shoulder PROM  MK MK  KSG MK MK KY &  Cerv ROM/  tx     R 1st rib mob and AC mob  MK MK and GH inf/post  KSG MK GH inf/post MK GH inf/post gr.2      R neck and shoulder mx STM  MK pec and lateral scap MK pec and lateral scap  KSG MK and L cervical MK lateral scapular KY                  Neuro Re-Ed             Scap retractions HEP 5\"x10           Serratus punches  5\"x10 5\"x10          Scap 4   Rows GTB 5\"x10  GTRRows 5\" 10 Rows GTB 5\"x15 Rows GTB 5\"x15                                                                       Ther Ex             UBE / Pulley  Pulley 2x10 Pulley 2x10  Pulley 2x10   Pulley 2x10     Wall slides flx and abd   nv  X10  flex B        Doorway pec stretch HEP high and low  5x10\" high  5x10\" high 5x10\" alt high and low 5x10\" alt high and low      Open books     5\" x10 5\"x10 5\"x10      Self 1st rib mob HEP  5\"X10 R          S/l shoulder ER  Nv? 5\"x10 R  5\" x10 5\"x15 R 5\"x15 R      Cane flex to ER behind head     10x  10x 10x "      Supine shoulder flexion AAROM      1x10       Resisted IR             Cervical SNAGs HEP            Thoracic ext with LTR  1x5 B hold          Ther Activity                                       Gait Training                                       Modalities             MHP  10 min post 10 min post  10 min post 10 min post 10 min post

## 2025-06-03 ENCOUNTER — OFFICE VISIT (OUTPATIENT)
Dept: PHYSICAL THERAPY | Facility: CLINIC | Age: 50
End: 2025-06-03
Attending: ORTHOPAEDIC SURGERY
Payer: COMMERCIAL

## 2025-06-03 DIAGNOSIS — M25.511 RIGHT SHOULDER PAIN, UNSPECIFIED CHRONICITY: Primary | ICD-10-CM

## 2025-06-03 PROCEDURE — 97140 MANUAL THERAPY 1/> REGIONS: CPT

## 2025-06-03 PROCEDURE — 97112 NEUROMUSCULAR REEDUCATION: CPT

## 2025-06-03 PROCEDURE — 97110 THERAPEUTIC EXERCISES: CPT

## 2025-06-03 NOTE — PROGRESS NOTES
"Daily Note     Today's date: 6/3/2025  Patient name: Beth Dorado  : 1975  MRN: [id number]  Referring provider: Severo Graff MD  Dx:   Encounter Diagnosis     ICD-10-CM    1. Right shoulder pain, unspecified chronicity  M25.511             Start Time: 930  Stop Time: 1015  Total time in clinic (min): 45 minutes    Subjective:  Patient reports her shoulder continues to feel \"sore and stiff\" with most pain in the front of her shoulder. Notes \"it depends on the day\".      Objective: See treatment diary below      Assessment: Tolerated treatment well. Added posterior capsule stretch and scap mobs to address posterior shoulder tightness. Responded well to posterior shoulder and scapular strengthening. Did note most difficulty and discomfort with serratus punches and wall slides. Patient exhibited good technique with therapeutic exercises and would benefit from continued PT.      Plan: Continue per plan of care.      Precautions: none noted      Manuals 4/30 5/6 5/9 5/12 5/15 5/19 5/27 5/29 6/3    R shoulder PROM  MK MK  KSG MK MK KY &  Cerv ROM/  tx MK    R 1st rib mob and AC mob  MK MK and GH inf/post  KSG MK GH inf/post MK GH inf/post gr.2  MK GH inf/post gr.2    R neck and shoulder mx STM  MK pec and lateral scap MK pec and lateral scap  KSG MK and L cervical MK lateral scapular KY MK lateral scap, subscap    Scap mobs         MK and STM    Neuro Re-Ed             Scap retractions HEP 5\"x10           Serratus punches  5\"x10 5\"x10      5\"x10 p!    Scap 4   Rows GTB 5\"x10  GTRRows 5\" 10 Rows GTB 5\"x15 Rows GTB 5\"x15  Rows 10lb 5\"x15    Chin tucks             No monies                                                    Ther Ex             UBE / Pulley  Pulley 2x10 Pulley 2x10  Pulley 2x10   Pulley 2x10 Pulley 5\"x10    Wall slides flx and abd   nv  X10  flex B    X5 flx p!    Doorway pec stretch HEP high and low  5x10\" high  5x10\" high 5x10\" alt high and low 5x10\" alt high and low  5x10\" alt high and low    Open " "books     5\" x10 5\"x10 5\"x10      Self 1st rib mob HEP  5\"X10 R      UT stretch 5x10\"    S/l shoulder ER  Nv? 5\"x10 R  5\" x10 5\"x15 R 5\"x15 R  5\"x15 R    Cane flex to ER behind head     10x  10x 10x  r    Supine shoulder flexion AAROM      1x10       Resisted IR             Cervical SNAGs HEP            Sleeper stretch         Nv?    Posterior capsule stretch         5x10\" R    Thoracic ext with LTR  1x5 B hold          Ther Activity                                       Gait Training                                       Modalities             MHP  10 min post 10 min post  10 min post 10 min post 10 min post  10 min post                        "

## 2025-06-05 ENCOUNTER — APPOINTMENT (OUTPATIENT)
Dept: PHYSICAL THERAPY | Facility: CLINIC | Age: 50
End: 2025-06-05
Attending: ORTHOPAEDIC SURGERY
Payer: COMMERCIAL

## 2025-06-09 ENCOUNTER — VBI (OUTPATIENT)
Dept: ADMINISTRATIVE | Facility: OTHER | Age: 50
End: 2025-06-09

## 2025-06-09 ENCOUNTER — APPOINTMENT (OUTPATIENT)
Dept: PHYSICAL THERAPY | Facility: CLINIC | Age: 50
End: 2025-06-09
Attending: ORTHOPAEDIC SURGERY
Payer: COMMERCIAL

## 2025-06-09 NOTE — TELEPHONE ENCOUNTER
06/09/25 2:59 PM     Chart reviewed for Total Abdominal Hysterectomy ; nothing is submitted to the patient's insurance at this time.     Arpil Cabral MA   [facility]

## 2025-06-10 ENCOUNTER — OFFICE VISIT (OUTPATIENT)
Dept: PHYSICAL THERAPY | Facility: CLINIC | Age: 50
End: 2025-06-10
Attending: ORTHOPAEDIC SURGERY
Payer: COMMERCIAL

## 2025-06-10 DIAGNOSIS — M25.511 RIGHT SHOULDER PAIN, UNSPECIFIED CHRONICITY: Primary | ICD-10-CM

## 2025-06-10 PROCEDURE — 97140 MANUAL THERAPY 1/> REGIONS: CPT

## 2025-06-10 PROCEDURE — 97112 NEUROMUSCULAR REEDUCATION: CPT

## 2025-06-10 PROCEDURE — 97110 THERAPEUTIC EXERCISES: CPT

## 2025-06-10 NOTE — PROGRESS NOTES
"Daily Note     Today's date: 6/10/2025  Patient name: Beth Dorado  : 1975  MRN: [id number]  Referring provider: Severo Graff MD  Dx:   Encounter Diagnosis     ICD-10-CM    1. Right shoulder pain, unspecified chronicity  M25.511           Start Time: 1215  Stop Time: 1255  Total time in clinic (min): 40 minutes    Subjective:  Patient reports her shoulder feels good today and denies pain. Notes having less pain this past week but does note she had a migraine following lv. Notes she has also been seeing the chiropractor once a week which she also notes has been helping and calming her nervous system. Notes most pain and restriction with reaching back to put on a jacket.      Objective: See treatment diary below      Assessment: Tolerated treatment well. Improved R shoulder mobility noted compared to previous sessions. Most tightness noted in her posterior shoulder mx addressed with STM and stretching. Also progressed R shoulder and scapular stability without adverse reaction. Progressing well. Patient exhibited good technique with therapeutic exercises and would benefit from continued PT.      Plan: Continue per plan of care.      Precautions: none noted      Manuals  5/9 5/12 5/15 5/19 5/27 5/29 6/3 6/10   R shoulder PROM  MK MK  KSG MK MK KY &  Cerv ROM/  tx MK MK   R 1st rib mob and AC mob  MK MK and GH inf/post  KSG MK GH inf/post MK GH inf/post gr.2  MK GH inf/post gr.2 MK GH inf/post gr.3   R neck and shoulder mx STM  MK pec and lateral scap MK pec and lateral scap  KSG MK and L cervical MK lateral scapular KY MK lateral scap, subscap MK lateral scap, subscap   Scap mobs         MK and STM MK and STM   Neuro Re-Ed             Scap retractions HEP 5\"x10           Serratus punches  5\"x10 5\"x10      5\"x10 p!    Scap 4   Rows GTB 5\"x10  GTRRows 5\" 10 Rows GTB 5\"x15 Rows GTB 5\"x15  Rows 10lb 5\"x15 Rows 10lb 5\"x20   Chin tucks             No monies          GTB 5\"x10   Shoulder ext AAROM              " "                         Ther Ex             UBE / Pulley  Pulley 2x10 Pulley 2x10  Pulley 2x10   Pulley 2x10 Pulley 5\"x10    Wall slides flx and abd   nv  X10  flex B    X5 flx p!    Doorway pec stretch HEP high and low  5x10\" high  5x10\" high 5x10\" alt high and low 5x10\" alt high and low  5x10\" alt high and low held   Open books     5\" x10 5\"x10 5\"x10   5\"x8 R   Self 1st rib mob HEP  5\"X10 R      UT stretch 5x10\" UT stretch 5x10\"   S/l shoulder ER  Nv? 5\"x10 R  5\" x10 5\"x15 R 5\"x15 R  5\"x15 R 5\"x20 R   Cane flex to ER behind head     10x  10x 10x  r    Supine shoulder flexion AAROM      1x10       Resisted IR             Cervical SNAGs HEP            Sleeper stretch         Nv?    Posterior capsule stretch         5x10\" R 5x10\" R supine   Thoracic ext with LTR  1x5 B hold          Ther Activity                                       Gait Training                                       Modalities             MHP  10 min post 10 min post  10 min post 10 min post 10 min post  10 min post                        "

## 2025-06-12 ENCOUNTER — APPOINTMENT (OUTPATIENT)
Dept: PHYSICAL THERAPY | Facility: CLINIC | Age: 50
End: 2025-06-12
Attending: ORTHOPAEDIC SURGERY
Payer: COMMERCIAL

## 2025-06-23 ENCOUNTER — OFFICE VISIT (OUTPATIENT)
Dept: PHYSICAL THERAPY | Facility: CLINIC | Age: 50
End: 2025-06-23
Attending: ORTHOPAEDIC SURGERY
Payer: COMMERCIAL

## 2025-06-23 DIAGNOSIS — M25.511 RIGHT SHOULDER PAIN, UNSPECIFIED CHRONICITY: Primary | ICD-10-CM

## 2025-06-23 PROCEDURE — 97110 THERAPEUTIC EXERCISES: CPT

## 2025-06-23 PROCEDURE — 97112 NEUROMUSCULAR REEDUCATION: CPT

## 2025-06-23 PROCEDURE — 97140 MANUAL THERAPY 1/> REGIONS: CPT

## 2025-06-23 NOTE — PROGRESS NOTES
"Daily Note     Today's date: 2025  Patient name: Beth Dorado  : 1975  MRN: [id number]  Referring provider: Severo Graff MD  Dx:   Encounter Diagnosis     ICD-10-CM    1. Right shoulder pain, unspecified chronicity  M25.511           Start Time: 1407  Stop Time: 1445  Total time in clinic (min): 38 minutes    Subjective:  Patient reports her shoulder has been more sore this past week from being on vacation and sleeping on her R side in a different bed.       Objective: See treatment diary below      Assessment: Tolerated treatment well. Addressed soft tissue restrictions in her lateral scapular and subscapularis mx with improved PROM noted following. Did note some increasing tingling in her R hand following s/l manuals and with s/l ER and UT stretching, so shifted focus to scapular stability with relief in tingling noted following no monies and rows. Relief in sx noted post session. Patient exhibited good technique with therapeutic exercises and would benefit from continued PT.      Plan: Continue per plan of care.      Precautions: none noted    *HEP  Manuals 4/30 5/6 5/9 5/12 5/15 5/19 5/27 5/29 6/3 6/10 6/23   R shoulder PROM  MK MK  KSG MK MK KY &  Cerv ROM/  tx MK MK MK   R 1st rib mob and AC mob  MK MK and GH inf/post  KSG MK GH inf/post MK GH inf/post gr.2  MK GH inf/post gr.2 MK GH inf/post gr.3 MK GH inf/post gr.3   R neck and shoulder mx STM  MK pec and lateral scap MK pec and lateral scap  KSG MK and L cervical MK lateral scapular KY MK lateral scap, subscap MK lateral scap, subscap MK lateral scap, subscap   Scap mobs         MK and STM MK and STM MK   Neuro Re-Ed              Scap retractions HEP 5\"x10            Serratus punches  5\"x10 5\"x10      5\"x10 p!     *Scap 4   Rows GTB 5\"x10  GTRRows 5\" 10 Rows GTB 5\"x15 Rows GTB 5\"x15  Rows 10lb 5\"x15 Rows 10lb 5\"x20 Rows GTB 5\"x20   Chin tucks              *No monies          GTB 5\"x10 GTB 5\"x10   Shoulder ext AAROM                              " "            Ther Ex              UBE / Pulley  Pulley 2x10 Pulley 2x10  Pulley 2x10   Pulley 2x10 Pulley 5\"x10     Wall slides flx and abd   nv  X10  flex B    X5 flx p!     Doorway pec stretch HEP high and low  5x10\" high  5x10\" high 5x10\" alt high and low 5x10\" alt high and low  5x10\" alt high and low held dc   *Open books     5\" x10 5\"x10 5\"x10   5\"x8 R    *UT stretch HEP  5\"X10 R      UT stretch 5x10\" UT stretch 5x10\" trial   S/l shoulder ER  Nv? 5\"x10 R  5\" x10 5\"x15 R 5\"x15 R  5\"x15 R 5\"x20 R trial   *Cane flex to ER behind head     10x  10x 10x  r     Supine shoulder flexion AAROM      1x10        Resisted IR              Cervical SNAGs HEP             Sleeper stretch         Nv?     *Posterior capsule stretch         5x10\" R 5x10\" R supine 3x20\" R supine   Thoracic ext with LTR  1x5 B hold           Ther Activity                                          Gait Training                                          Modalities              MHP  10 min post 10 min post  10 min post 10 min post 10 min post  10 min post                          "

## 2025-07-01 ENCOUNTER — OFFICE VISIT (OUTPATIENT)
Dept: PHYSICAL THERAPY | Facility: CLINIC | Age: 50
End: 2025-07-01
Attending: ORTHOPAEDIC SURGERY
Payer: COMMERCIAL

## 2025-07-01 DIAGNOSIS — M25.511 RIGHT SHOULDER PAIN, UNSPECIFIED CHRONICITY: Primary | ICD-10-CM

## 2025-07-01 PROCEDURE — 97140 MANUAL THERAPY 1/> REGIONS: CPT

## 2025-07-01 PROCEDURE — 97112 NEUROMUSCULAR REEDUCATION: CPT

## 2025-07-01 NOTE — PROGRESS NOTES
"Daily Note     Today's date: 2025  Patient name: Beth Dorado  : 1975  MRN: [id number]  Referring provider: Severo Graff MD  Dx:   Encounter Diagnosis     ICD-10-CM    1. Right shoulder pain, unspecified chronicity  M25.511           Start Time: 1138  Stop Time: 1203  Total time in clinic (min): 25 minutes    Subjective:  Patient reports her shoulder has been \"on and off\" but has been feeling pretty good today and this weekend. 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      Objective: See treatment diary below      Assessment: Tolerated treatment well. Session shortened per patient request due to another appointment. Focused on manuals to improve R shoulder mobility with decreased tightness and improved mobility noted following. Reviewed HEP with good understanding and compliance noted. Reviewed no monies and cane flexion with ER to try during performance this weekend during breaks to stretch out of guitar playing posture. Patient exhibited good technique with therapeutic exercises and would benefit from continued PT.      Plan: Continue per plan of care.      Precautions: none noted    *HEP  Manuals  5/9 5/12 5/15 5/19 5/27 5/29 6/3 6/10 6/23 7/1   R shoulder PROM  MK MK  KSG MK MK KY &  Cerv ROM/  tx MK MK MK MK   R 1st rib mob and AC mob  MK MK and GH inf/post  KSG MK GH inf/post MK GH inf/post gr.2  MK GH inf/post gr.2 MK GH inf/post gr.3 MK GH inf/post gr.3 MK GH inf/post gr.3   R neck and shoulder mx STM  MK pec and lateral scap MK pec and lateral scap  KSG MK and L cervical MK lateral scapular KY MK lateral scap, subscap MK lateral scap, subscap MK lateral scap, subscap MK lateral scap, subscap   Scap mobs         MK and STM MK and STM MK    Neuro Re-Ed               Scap retractions HEP 5\"x10             Serratus punches  5\"x10 5\"x10      5\"x10 p!      *Scap 4   Rows GTB 5\"x10  GTRRows 5\" 10 Rows GTB 5\"x15 Rows GTB 5\"x15  Rows " "10lb 5\"x15 Rows 10lb 5\"x20 Rows GTB 5\"x20 r   Chin tucks               *No monies          GTB 5\"x10 GTB 5\"x10 r   Shoulder ext AAROM                                             Ther Ex               UBE / Pulley  Pulley 2x10 Pulley 2x10  Pulley 2x10   Pulley 2x10 Pulley 5\"x10      Wall slides flx and abd   nv  X10  flex B    X5 flx p!      Doorway pec stretch HEP high and low  5x10\" high  5x10\" high 5x10\" alt high and low 5x10\" alt high and low  5x10\" alt high and low held dc    *Open books     5\" x10 5\"x10 5\"x10   5\"x8 R     *UT stretch HEP  5\"X10 R      UT stretch 5x10\" UT stretch 5x10\" trial    S/l shoulder ER  Nv? 5\"x10 R  5\" x10 5\"x15 R 5\"x15 R  5\"x15 R 5\"x20 R trial    *Cane flex to ER behind head     10x  10x 10x  r   r   Supine shoulder flexion AAROM      1x10         Resisted IR            Nv?   Cervical SNAGs HEP              Sleeper stretch         Nv?      *Posterior capsule stretch         5x10\" R 5x10\" R supine 3x20\" R supine    Functional IR stretch with cane            Nv?   Thoracic ext with LTR  1x5 B hold            Ther Activity                                             Gait Training                                             Modalities               MHP  10 min post 10 min post  10 min post 10 min post 10 min post  10 min post                            "

## 2025-07-08 ENCOUNTER — APPOINTMENT (OUTPATIENT)
Dept: PHYSICAL THERAPY | Facility: CLINIC | Age: 50
End: 2025-07-08
Attending: ORTHOPAEDIC SURGERY
Payer: COMMERCIAL

## 2025-07-15 ENCOUNTER — OFFICE VISIT (OUTPATIENT)
Dept: PHYSICAL THERAPY | Facility: CLINIC | Age: 50
End: 2025-07-15
Attending: ORTHOPAEDIC SURGERY
Payer: COMMERCIAL

## 2025-07-15 DIAGNOSIS — M25.511 RIGHT SHOULDER PAIN, UNSPECIFIED CHRONICITY: Primary | ICD-10-CM

## 2025-07-15 PROCEDURE — 97110 THERAPEUTIC EXERCISES: CPT

## 2025-07-15 PROCEDURE — 97140 MANUAL THERAPY 1/> REGIONS: CPT

## 2025-07-15 PROCEDURE — 97112 NEUROMUSCULAR REEDUCATION: CPT

## 2025-08-12 ENCOUNTER — OFFICE VISIT (OUTPATIENT)
Dept: PHYSICAL THERAPY | Facility: CLINIC | Age: 50
End: 2025-08-12
Attending: ORTHOPAEDIC SURGERY
Payer: COMMERCIAL

## 2025-08-20 ENCOUNTER — OFFICE VISIT (OUTPATIENT)
Dept: PHYSICAL THERAPY | Facility: CLINIC | Age: 50
End: 2025-08-20
Attending: ORTHOPAEDIC SURGERY
Payer: COMMERCIAL

## 2025-08-20 DIAGNOSIS — M25.511 RIGHT SHOULDER PAIN, UNSPECIFIED CHRONICITY: Primary | ICD-10-CM

## 2025-08-20 PROCEDURE — 97140 MANUAL THERAPY 1/> REGIONS: CPT

## 2025-08-20 PROCEDURE — 97110 THERAPEUTIC EXERCISES: CPT

## (undated) DEVICE — INTENDED FOR TISSUE SEPARATION, AND OTHER PROCEDURES THAT REQUIRE A SHARP SURGICAL BLADE TO PUNCTURE OR CUT.: Brand: BARD-PARKER SAFETY BLADES SIZE 11, STERILE

## (undated) DEVICE — ASTOUND STANDARD SURGICAL GOWN, XL: Brand: CONVERTORS

## (undated) DEVICE — NEEDLE COUNTER LG W/RULER

## (undated) DEVICE — LARGE NEEDLE DRIVER: Brand: ENDOWRIST

## (undated) DEVICE — TROCAR: Brand: KII SLEEVE

## (undated) DEVICE — EXIDINE 4 PCT

## (undated) DEVICE — SCD SEQUENTIAL COMPRESSION COMFORT SLEEVE MEDIUM KNEE LENGTH: Brand: KENDALL SCD

## (undated) DEVICE — PENCILETTE PUSH BUTTON COATED

## (undated) DEVICE — GLOVE INDICATOR PI UNDERGLOVE SZ 8.5 BLUE

## (undated) DEVICE — MAYO STAND COVER: Brand: CONVERTORS

## (undated) DEVICE — GLOVE PI ULTRA TOUCH SZ.8.5

## (undated) DEVICE — CANNULA SEAL

## (undated) DEVICE — VESSEL SEALER EXTEND: Brand: ENDOWRIST

## (undated) DEVICE — PROGRASP FORCEPS: Brand: ENDOWRIST

## (undated) DEVICE — GUIDEWIRE STRGHT TIP 0.035 IN  SOLO PLUS

## (undated) DEVICE — COLUMN DRAPE

## (undated) DEVICE — TRAY FOLEY 16FR URIMETER SILICONE SURESTEP

## (undated) DEVICE — DRAPE EQUIPMENT RF WAND

## (undated) DEVICE — SYRINGE 50ML LL

## (undated) DEVICE — TROCAR PORT ACCESS 8 X100MML W BLDLS OPTICAL TIP AIRSEAL

## (undated) DEVICE — SUT VICRYL 0 CT-1 36 IN J946H

## (undated) DEVICE — SUT VICRYL 1 CT-1 27 IN J261H

## (undated) DEVICE — GLOVE INDICATOR PI UNDERGLOVE SZ 7 BLUE

## (undated) DEVICE — KIT, BETHLEHEM ROBOTIC PROST: Brand: CARDINAL HEALTH

## (undated) DEVICE — 40595 XL TRENDELENBURG POSITIONING KIT: Brand: 40595 XL TRENDELENBURG POSITIONING KIT

## (undated) DEVICE — VISUALIZATION SYSTEM: Brand: CLEARIFY

## (undated) DEVICE — SYRINGE 10ML LL CONTROL TOP

## (undated) DEVICE — CATH URETERAL 5FR X 70 CM FLEX TIP POLYUR BARD

## (undated) DEVICE — GLOVE INDICATOR PI UNDERGLOVE SZ 9 BLUE

## (undated) DEVICE — AIRSEAL TUBE SMOKE EVAC LUMENX3 FILTERED

## (undated) DEVICE — PREMIUM DRY TRAY LF: Brand: MEDLINE INDUSTRIES, INC.

## (undated) DEVICE — 3000CC GUARDIAN II: Brand: GUARDIAN

## (undated) DEVICE — SUT STRATAFIX SPIRAL 2-0 CT-1 30 CM SXPP1B410

## (undated) DEVICE — SUT MONOCRYL 4-0 PS-2 27 IN Y426H

## (undated) DEVICE — OCCLUDER COLPO-PNEUMO

## (undated) DEVICE — MONOPOLAR CURVED SCISSORS: Brand: ENDOWRIST

## (undated) DEVICE — SURGICEL NU-KNIT 3 X 4

## (undated) DEVICE — ARM DRAPE

## (undated) DEVICE — TIP COVER ACCESSORY

## (undated) DEVICE — EXOFIN PRECISION PEN HIGH VISCOSITY TOPICAL SKIN ADHESIVE: Brand: EXOFIN PRECISION PEN, 1G

## (undated) DEVICE — ELECTRO LUBE IS A SINGLE PATIENT USE DEVICE THAT IS INTENDED TO BE USED ON ELECTROSURGICAL ELECTRODES TO REDUCE STICKING.: Brand: KEY SURGICAL ELECTRO LUBE

## (undated) DEVICE — METZENBAUM ADTEC SINGLE USE DISSECTING SCISSORS, SHAFT ONLY, MONOPOLAR, CURVED TO LEFT, WORKING LENGTH: 12 1/4", (310 MM), DIAM. 5 MM, INSULATED, DOUBLE ACTION, STERILE, DISPOSABLE, PACKAGE OF 10 PIECES: Brand: AESCULAP